# Patient Record
Sex: FEMALE | Race: WHITE | NOT HISPANIC OR LATINO | Employment: OTHER | URBAN - NONMETROPOLITAN AREA
[De-identification: names, ages, dates, MRNs, and addresses within clinical notes are randomized per-mention and may not be internally consistent; named-entity substitution may affect disease eponyms.]

---

## 2017-04-13 ENCOUNTER — HOSPITAL ENCOUNTER (INPATIENT)
Facility: HOSPITAL | Age: 62
LOS: 2 days | Discharge: HOME | End: 2017-04-15
Attending: PSYCHIATRY & NEUROLOGY | Admitting: PSYCHIATRY & NEUROLOGY

## 2017-04-13 DIAGNOSIS — R41.89 IMPAIRED COGNITION: Primary | ICD-10-CM

## 2017-04-13 DIAGNOSIS — R07.9 CHEST PAIN IN ADULT: ICD-10-CM

## 2017-04-13 PROBLEM — I63.9 STROKE (HCC): Status: ACTIVE | Noted: 2017-04-13

## 2017-04-13 LAB
ALBUMIN SERPL-MCNC: 3.1 G/DL (ref 3.5–5)
ALBUMIN/GLOB SERPL: 0.8 G/DL (ref 1.1–2.5)
ALP SERPL-CCNC: 115 U/L (ref 24–120)
ALT SERPL W P-5'-P-CCNC: <15 U/L (ref 0–54)
ANION GAP SERPL CALCULATED.3IONS-SCNC: 11 MMOL/L (ref 4–13)
AST SERPL-CCNC: 28 U/L (ref 7–45)
BILIRUB SERPL-MCNC: 0.7 MG/DL (ref 0.1–1)
BUN BLD-MCNC: 18 MG/DL (ref 5–21)
BUN/CREAT SERPL: 11.7 (ref 7–25)
CALCIUM SPEC-SCNC: 8.5 MG/DL (ref 8.4–10.4)
CHLORIDE SERPL-SCNC: 105 MMOL/L (ref 98–110)
CO2 SERPL-SCNC: 24 MMOL/L (ref 24–31)
CREAT BLD-MCNC: 1.54 MG/DL (ref 0.5–1.4)
DEPRECATED RDW RBC AUTO: 53.4 FL (ref 40–54)
ERYTHROCYTE [DISTWIDTH] IN BLOOD BY AUTOMATED COUNT: 15.5 % (ref 12–15)
GFR SERPL CREATININE-BSD FRML MDRD: 34 ML/MIN/1.73
GLOBULIN UR ELPH-MCNC: 3.7 GM/DL
GLUCOSE BLD-MCNC: 124 MG/DL (ref 70–100)
GLUCOSE BLDC GLUCOMTR-MCNC: 124 MG/DL (ref 70–130)
HCT VFR BLD AUTO: 39.5 % (ref 37–47)
HGB BLD-MCNC: 13.2 G/DL (ref 12–16)
MCH RBC QN AUTO: 31.6 PG (ref 28–32)
MCHC RBC AUTO-ENTMCNC: 33.4 G/DL (ref 33–36)
MCV RBC AUTO: 94.5 FL (ref 82–98)
PLATELET # BLD AUTO: 349 10*3/MM3 (ref 130–400)
PMV BLD AUTO: 9.9 FL (ref 6–12)
POTASSIUM BLD-SCNC: 4.6 MMOL/L (ref 3.5–5.3)
PROT SERPL-MCNC: 6.8 G/DL (ref 6.3–8.7)
RBC # BLD AUTO: 4.18 10*6/MM3 (ref 4.2–5.4)
SODIUM BLD-SCNC: 140 MMOL/L (ref 135–145)
TROPONIN I SERPL-MCNC: 0.01 NG/ML (ref 0–0.03)
WBC NRBC COR # BLD: 16.67 10*3/MM3 (ref 4.8–10.8)

## 2017-04-13 PROCEDURE — 25010000002 ENOXAPARIN PER 10 MG: Performed by: PSYCHIATRY & NEUROLOGY

## 2017-04-13 PROCEDURE — 82962 GLUCOSE BLOOD TEST: CPT

## 2017-04-13 PROCEDURE — 84484 ASSAY OF TROPONIN QUANT: CPT | Performed by: PSYCHIATRY & NEUROLOGY

## 2017-04-13 PROCEDURE — 85027 COMPLETE CBC AUTOMATED: CPT | Performed by: PSYCHIATRY & NEUROLOGY

## 2017-04-13 PROCEDURE — 80053 COMPREHEN METABOLIC PANEL: CPT | Performed by: PSYCHIATRY & NEUROLOGY

## 2017-04-13 RX ORDER — OXYCODONE AND ACETAMINOPHEN 10; 325 MG/1; MG/1
1 TABLET ORAL EVERY 6 HOURS PRN
Status: DISCONTINUED | OUTPATIENT
Start: 2017-04-13 | End: 2017-04-15 | Stop reason: HOSPADM

## 2017-04-13 RX ORDER — LABETALOL HYDROCHLORIDE 5 MG/ML
10 INJECTION, SOLUTION INTRAVENOUS
Status: DISCONTINUED | OUTPATIENT
Start: 2017-04-13 | End: 2017-04-15 | Stop reason: HOSPADM

## 2017-04-13 RX ORDER — ASPIRIN 325 MG
325 TABLET ORAL DAILY
Status: DISCONTINUED | OUTPATIENT
Start: 2017-04-14 | End: 2017-04-14

## 2017-04-13 RX ORDER — ATORVASTATIN CALCIUM 40 MG/1
80 TABLET, FILM COATED ORAL NIGHTLY
Status: DISCONTINUED | OUTPATIENT
Start: 2017-04-13 | End: 2017-04-15 | Stop reason: HOSPADM

## 2017-04-13 RX ORDER — FOLIC ACID 1 MG/1
1 TABLET ORAL DAILY
COMMUNITY

## 2017-04-13 RX ORDER — ONDANSETRON 2 MG/ML
4 INJECTION INTRAMUSCULAR; INTRAVENOUS EVERY 6 HOURS PRN
Status: DISCONTINUED | OUTPATIENT
Start: 2017-04-13 | End: 2017-04-15 | Stop reason: HOSPADM

## 2017-04-13 RX ORDER — SODIUM CHLORIDE 9 MG/ML
50 INJECTION, SOLUTION INTRAVENOUS CONTINUOUS
Status: DISCONTINUED | OUTPATIENT
Start: 2017-04-13 | End: 2017-04-15 | Stop reason: HOSPADM

## 2017-04-13 RX ORDER — SODIUM CHLORIDE 0.9 % (FLUSH) 0.9 %
1-10 SYRINGE (ML) INJECTION AS NEEDED
Status: DISCONTINUED | OUTPATIENT
Start: 2017-04-13 | End: 2017-04-15 | Stop reason: HOSPADM

## 2017-04-13 RX ORDER — ASPIRIN 300 MG/1
300 SUPPOSITORY RECTAL DAILY
Status: DISCONTINUED | OUTPATIENT
Start: 2017-04-14 | End: 2017-04-14

## 2017-04-13 RX ADMIN — SODIUM CHLORIDE 75 ML/HR: 9 INJECTION, SOLUTION INTRAVENOUS at 18:34

## 2017-04-13 RX ADMIN — ENOXAPARIN SODIUM 30 MG: 100 INJECTION SUBCUTANEOUS at 18:34

## 2017-04-13 RX ADMIN — DESMOPRESSIN ACETATE 80 MG: 0.2 TABLET ORAL at 21:37

## 2017-04-14 ENCOUNTER — APPOINTMENT (OUTPATIENT)
Dept: ULTRASOUND IMAGING | Facility: HOSPITAL | Age: 62
End: 2017-04-14

## 2017-04-14 ENCOUNTER — APPOINTMENT (OUTPATIENT)
Dept: CARDIOLOGY | Facility: HOSPITAL | Age: 62
End: 2017-04-14
Attending: PSYCHIATRY & NEUROLOGY

## 2017-04-14 LAB
ALBUMIN SERPL-MCNC: 3 G/DL (ref 3.5–5)
ALBUMIN/GLOB SERPL: 0.9 G/DL (ref 1.1–2.5)
ALP SERPL-CCNC: 113 U/L (ref 24–120)
ALT SERPL W P-5'-P-CCNC: 17 U/L (ref 0–54)
ANION GAP SERPL CALCULATED.3IONS-SCNC: 6 MMOL/L (ref 4–13)
ARTICHOKE IGE QN: 110 MG/DL (ref 0–99)
AST SERPL-CCNC: 23 U/L (ref 7–45)
BH CV ECHO MEAS - AO MAX PG (FULL): 2.9 MMHG
BH CV ECHO MEAS - AO MAX PG: 7.2 MMHG
BH CV ECHO MEAS - AO MEAN PG (FULL): 2 MMHG
BH CV ECHO MEAS - AO MEAN PG: 4 MMHG
BH CV ECHO MEAS - AO ROOT AREA (BSA CORRECTED): 1.7
BH CV ECHO MEAS - AO ROOT AREA: 10.8 CM^2
BH CV ECHO MEAS - AO ROOT DIAM: 3.7 CM
BH CV ECHO MEAS - AO V2 MAX: 134 CM/SEC
BH CV ECHO MEAS - AO V2 MEAN: 91.3 CM/SEC
BH CV ECHO MEAS - AO V2 VTI: 29.9 CM
BH CV ECHO MEAS - AVA(I,A): 2.5 CM^2
BH CV ECHO MEAS - AVA(I,D): 2.5 CM^2
BH CV ECHO MEAS - AVA(V,A): 2.4 CM^2
BH CV ECHO MEAS - AVA(V,D): 2.4 CM^2
BH CV ECHO MEAS - BSA(HAYCOCK): 2.4 M^2
BH CV ECHO MEAS - BSA: 2.2 M^2
BH CV ECHO MEAS - BZI_BMI: 43.6 KILOGRAMS/M^2
BH CV ECHO MEAS - BZI_METRIC_HEIGHT: 165.1 CM
BH CV ECHO MEAS - BZI_METRIC_WEIGHT: 118.8 KG
BH CV ECHO MEAS - CONTRAST EF 4CH: 62.1 ML/M^2
BH CV ECHO MEAS - EDV(CUBED): 91.1 ML
BH CV ECHO MEAS - EDV(MOD-SP4): 76 ML
BH CV ECHO MEAS - EDV(TEICH): 92.4 ML
BH CV ECHO MEAS - EF(CUBED): 71.3 %
BH CV ECHO MEAS - EF(MOD-SP4): 62.1 %
BH CV ECHO MEAS - EF(TEICH): 63.1 %
BH CV ECHO MEAS - ESV(CUBED): 26.2 ML
BH CV ECHO MEAS - ESV(MOD-SP4): 28.8 ML
BH CV ECHO MEAS - ESV(TEICH): 34.2 ML
BH CV ECHO MEAS - FS: 34 %
BH CV ECHO MEAS - IVS/LVPW: 1
BH CV ECHO MEAS - IVSD: 1.3 CM
BH CV ECHO MEAS - LA DIMENSION: 3.6 CM
BH CV ECHO MEAS - LA/AO: 0.97
BH CV ECHO MEAS - LAT PEAK E' VEL: 10 CM/SEC
BH CV ECHO MEAS - LV DIASTOLIC VOL/BSA (35-75): 34.3 ML/M^2
BH CV ECHO MEAS - LV MASS(C)D: 217.6 GRAMS
BH CV ECHO MEAS - LV MASS(C)DI: 98.1 GRAMS/M^2
BH CV ECHO MEAS - LV MAX PG: 4.3 MMHG
BH CV ECHO MEAS - LV MEAN PG: 2 MMHG
BH CV ECHO MEAS - LV SYSTOLIC VOL/BSA (12-30): 13 ML/M^2
BH CV ECHO MEAS - LV V1 MAX: 104 CM/SEC
BH CV ECHO MEAS - LV V1 MEAN: 74.1 CM/SEC
BH CV ECHO MEAS - LV V1 VTI: 24.2 CM
BH CV ECHO MEAS - LVIDD: 4.5 CM
BH CV ECHO MEAS - LVIDS: 3 CM
BH CV ECHO MEAS - LVLD AP4: 6.9 CM
BH CV ECHO MEAS - LVLS AP4: 6.5 CM
BH CV ECHO MEAS - LVOT AREA (M): 3.1 CM^2
BH CV ECHO MEAS - LVOT AREA: 3.1 CM^2
BH CV ECHO MEAS - LVOT DIAM: 2 CM
BH CV ECHO MEAS - LVPWD: 1.3 CM
BH CV ECHO MEAS - MED PEAK E' VEL: 5.98 CM/SEC
BH CV ECHO MEAS - MV A MAX VEL: 63.5 CM/SEC
BH CV ECHO MEAS - MV DEC TIME: 0.19 SEC
BH CV ECHO MEAS - MV E MAX VEL: 93.6 CM/SEC
BH CV ECHO MEAS - MV E/A: 1.5
BH CV ECHO MEAS - RAP SYSTOLE: 10 MMHG
BH CV ECHO MEAS - RVSP: 23.1 MMHG
BH CV ECHO MEAS - SI(AO): 144.9 ML/M^2
BH CV ECHO MEAS - SI(CUBED): 29.3 ML/M^2
BH CV ECHO MEAS - SI(LVOT): 34.3 ML/M^2
BH CV ECHO MEAS - SI(MOD-SP4): 21.3 ML/M^2
BH CV ECHO MEAS - SI(TEICH): 26.3 ML/M^2
BH CV ECHO MEAS - SV(AO): 321.5 ML
BH CV ECHO MEAS - SV(CUBED): 64.9 ML
BH CV ECHO MEAS - SV(LVOT): 76 ML
BH CV ECHO MEAS - SV(MOD-SP4): 47.2 ML
BH CV ECHO MEAS - SV(TEICH): 58.3 ML
BH CV ECHO MEAS - TR MAX VEL: 181 CM/SEC
BILIRUB SERPL-MCNC: 0.5 MG/DL (ref 0.1–1)
BUN BLD-MCNC: 18 MG/DL (ref 5–21)
BUN/CREAT SERPL: 12.6 (ref 7–25)
CALCIUM SPEC-SCNC: 8.2 MG/DL (ref 8.4–10.4)
CHLORIDE SERPL-SCNC: 107 MMOL/L (ref 98–110)
CHOLEST SERPL-MCNC: 183 MG/DL (ref 130–200)
CO2 SERPL-SCNC: 25 MMOL/L (ref 24–31)
CREAT BLD-MCNC: 1.43 MG/DL (ref 0.5–1.4)
DEPRECATED RDW RBC AUTO: 54.4 FL (ref 40–54)
ERYTHROCYTE [DISTWIDTH] IN BLOOD BY AUTOMATED COUNT: 15.7 % (ref 12–15)
GFR SERPL CREATININE-BSD FRML MDRD: 38 ML/MIN/1.73
GLOBULIN UR ELPH-MCNC: 3.5 GM/DL
GLUCOSE BLD-MCNC: 137 MG/DL (ref 70–100)
GLUCOSE BLDC GLUCOMTR-MCNC: 140 MG/DL (ref 70–130)
GLUCOSE BLDC GLUCOMTR-MCNC: 140 MG/DL (ref 70–130)
GLUCOSE BLDC GLUCOMTR-MCNC: 146 MG/DL (ref 70–130)
GLUCOSE BLDC GLUCOMTR-MCNC: 164 MG/DL (ref 70–130)
GLUCOSE BLDC GLUCOMTR-MCNC: 178 MG/DL (ref 70–130)
HBA1C MFR BLD: 8.6 %
HCT VFR BLD AUTO: 38.7 % (ref 37–47)
HCT VFR BLD AUTO: 41.6 % (ref 37–47)
HDLC SERPL-MCNC: 25 MG/DL
HGB BLD-MCNC: 12.6 G/DL (ref 12–16)
LDLC/HDLC SERPL: ABNORMAL {RATIO}
LEFT ATRIUM VOLUME INDEX: 30.3 ML/M2
LEFT ATRIUM VOLUME: 67.2 CM3
MCH RBC QN AUTO: 31 PG (ref 28–32)
MCHC RBC AUTO-ENTMCNC: 32.6 G/DL (ref 33–36)
MCV RBC AUTO: 95.1 FL (ref 82–98)
PA ADP PRP-ACNC: 218 PRU
PLATELET # BLD AUTO: 331 10*3/MM3 (ref 130–400)
PLATELET # BLD AUTO: 373 10*3/MM3 (ref 130–400)
PMV BLD AUTO: 9.8 FL (ref 6–12)
POTASSIUM BLD-SCNC: 4.2 MMOL/L (ref 3.5–5.3)
PROT SERPL-MCNC: 6.5 G/DL (ref 6.3–8.7)
RBC # BLD AUTO: 4.07 10*6/MM3 (ref 4.2–5.4)
SODIUM BLD-SCNC: 138 MMOL/L (ref 135–145)
TRIGL SERPL-MCNC: 500 MG/DL (ref 0–149)
TSH SERPL DL<=0.05 MIU/L-ACNC: 0.91 MIU/ML (ref 0.47–4.68)
WBC NRBC COR # BLD: 14.55 10*3/MM3 (ref 4.8–10.8)

## 2017-04-14 PROCEDURE — G9158 MOTOR SPEECH D/C STATUS: HCPCS

## 2017-04-14 PROCEDURE — 25010000002 PERFLUTREN (DEFINITY) 8.476 MG IN SODIUM CHLORIDE 10 ML INJECTION: Performed by: PSYCHIATRY & NEUROLOGY

## 2017-04-14 PROCEDURE — 93880 EXTRACRANIAL BILAT STUDY: CPT | Performed by: SURGERY

## 2017-04-14 PROCEDURE — 92523 SPEECH SOUND LANG COMPREHEN: CPT

## 2017-04-14 PROCEDURE — G8979 MOBILITY GOAL STATUS: HCPCS | Performed by: PHYSICAL THERAPIST

## 2017-04-14 PROCEDURE — 83036 HEMOGLOBIN GLYCOSYLATED A1C: CPT | Performed by: PSYCHIATRY & NEUROLOGY

## 2017-04-14 PROCEDURE — 85576 BLOOD PLATELET AGGREGATION: CPT | Performed by: PSYCHIATRY & NEUROLOGY

## 2017-04-14 PROCEDURE — 84443 ASSAY THYROID STIM HORMONE: CPT | Performed by: PSYCHIATRY & NEUROLOGY

## 2017-04-14 PROCEDURE — G8978 MOBILITY CURRENT STATUS: HCPCS | Performed by: PHYSICAL THERAPIST

## 2017-04-14 PROCEDURE — 97165 OT EVAL LOW COMPLEX 30 MIN: CPT

## 2017-04-14 PROCEDURE — 82962 GLUCOSE BLOOD TEST: CPT

## 2017-04-14 PROCEDURE — 63710000001 INSULIN LISPRO (HUMAN) PER 5 UNITS: Performed by: PSYCHIATRY & NEUROLOGY

## 2017-04-14 PROCEDURE — G8980 MOBILITY D/C STATUS: HCPCS | Performed by: PHYSICAL THERAPIST

## 2017-04-14 PROCEDURE — G9186 MOTOR SPEECH GOAL STATUS: HCPCS

## 2017-04-14 PROCEDURE — G8988 SELF CARE GOAL STATUS: HCPCS

## 2017-04-14 PROCEDURE — 80053 COMPREHEN METABOLIC PANEL: CPT | Performed by: PSYCHIATRY & NEUROLOGY

## 2017-04-14 PROCEDURE — G8987 SELF CARE CURRENT STATUS: HCPCS

## 2017-04-14 PROCEDURE — 25010000002 ENOXAPARIN PER 10 MG: Performed by: PSYCHIATRY & NEUROLOGY

## 2017-04-14 PROCEDURE — G8989 SELF CARE D/C STATUS: HCPCS

## 2017-04-14 PROCEDURE — 99223 1ST HOSP IP/OBS HIGH 75: CPT | Performed by: PSYCHIATRY & NEUROLOGY

## 2017-04-14 PROCEDURE — C8929 TTE W OR WO FOL WCON,DOPPLER: HCPCS

## 2017-04-14 PROCEDURE — 80061 LIPID PANEL: CPT | Performed by: PSYCHIATRY & NEUROLOGY

## 2017-04-14 PROCEDURE — 93880 EXTRACRANIAL BILAT STUDY: CPT

## 2017-04-14 PROCEDURE — 97161 PT EVAL LOW COMPLEX 20 MIN: CPT

## 2017-04-14 PROCEDURE — 85027 COMPLETE CBC AUTOMATED: CPT | Performed by: PSYCHIATRY & NEUROLOGY

## 2017-04-14 PROCEDURE — 93306 TTE W/DOPPLER COMPLETE: CPT | Performed by: INTERNAL MEDICINE

## 2017-04-14 PROCEDURE — G8999 MOTOR SPEECH CURRENT STATUS: HCPCS

## 2017-04-14 RX ORDER — METOPROLOL SUCCINATE 100 MG/1
100 TABLET, EXTENDED RELEASE ORAL DAILY
Status: DISCONTINUED | OUTPATIENT
Start: 2017-04-15 | End: 2017-04-15 | Stop reason: HOSPADM

## 2017-04-14 RX ORDER — METOPROLOL SUCCINATE 50 MG/1
50 TABLET, EXTENDED RELEASE ORAL DAILY
Status: DISCONTINUED | OUTPATIENT
Start: 2017-04-14 | End: 2017-04-14

## 2017-04-14 RX ORDER — ASPIRIN 81 MG/1
81 TABLET, CHEWABLE ORAL DAILY
Status: DISCONTINUED | OUTPATIENT
Start: 2017-04-15 | End: 2017-04-15 | Stop reason: HOSPADM

## 2017-04-14 RX ORDER — PANTOPRAZOLE SODIUM 40 MG/1
40 TABLET, DELAYED RELEASE ORAL
Status: DISCONTINUED | OUTPATIENT
Start: 2017-04-14 | End: 2017-04-15 | Stop reason: HOSPADM

## 2017-04-14 RX ORDER — FOLIC ACID 1 MG/1
1 TABLET ORAL DAILY
Status: DISCONTINUED | OUTPATIENT
Start: 2017-04-14 | End: 2017-04-15 | Stop reason: HOSPADM

## 2017-04-14 RX ORDER — ALBUTEROL SULFATE 2.5 MG/3ML
2.5 SOLUTION RESPIRATORY (INHALATION) EVERY 6 HOURS PRN
Status: DISCONTINUED | OUTPATIENT
Start: 2017-04-14 | End: 2017-04-15 | Stop reason: HOSPADM

## 2017-04-14 RX ORDER — OXYCODONE AND ACETAMINOPHEN 10; 325 MG/1; MG/1
1 TABLET ORAL EVERY 6 HOURS PRN
Status: DISCONTINUED | OUTPATIENT
Start: 2017-04-14 | End: 2017-04-14 | Stop reason: SDUPTHER

## 2017-04-14 RX ORDER — CLOPIDOGREL BISULFATE 75 MG/1
75 TABLET ORAL DAILY
Status: DISCONTINUED | OUTPATIENT
Start: 2017-04-14 | End: 2017-04-15 | Stop reason: HOSPADM

## 2017-04-14 RX ORDER — PREGABALIN 100 MG/1
100 CAPSULE ORAL 3 TIMES DAILY
Status: DISCONTINUED | OUTPATIENT
Start: 2017-04-14 | End: 2017-04-15 | Stop reason: HOSPADM

## 2017-04-14 RX ORDER — ROPINIROLE 2 MG/1
2 TABLET, FILM COATED ORAL NIGHTLY PRN
COMMUNITY

## 2017-04-14 RX ORDER — LISINOPRIL 20 MG/1
20 TABLET ORAL DAILY
Status: DISCONTINUED | OUTPATIENT
Start: 2017-04-14 | End: 2017-04-15 | Stop reason: HOSPADM

## 2017-04-14 RX ORDER — METOPROLOL SUCCINATE 100 MG/1
100 TABLET, EXTENDED RELEASE ORAL DAILY
COMMUNITY

## 2017-04-14 RX ORDER — DIAZEPAM 5 MG/1
5 TABLET ORAL 2 TIMES DAILY PRN
Status: DISCONTINUED | OUTPATIENT
Start: 2017-04-14 | End: 2017-04-15 | Stop reason: HOSPADM

## 2017-04-14 RX ORDER — POTASSIUM CHLORIDE 1.5 G/1.77G
20 POWDER, FOR SOLUTION ORAL 2 TIMES DAILY
Status: DISCONTINUED | OUTPATIENT
Start: 2017-04-14 | End: 2017-04-15 | Stop reason: HOSPADM

## 2017-04-14 RX ORDER — NICOTINE POLACRILEX 4 MG
15 LOZENGE BUCCAL
Status: DISCONTINUED | OUTPATIENT
Start: 2017-04-14 | End: 2017-04-15 | Stop reason: HOSPADM

## 2017-04-14 RX ORDER — DULOXETIN HYDROCHLORIDE 30 MG/1
30 CAPSULE, DELAYED RELEASE ORAL DAILY
Status: DISCONTINUED | OUTPATIENT
Start: 2017-04-14 | End: 2017-04-15 | Stop reason: HOSPADM

## 2017-04-14 RX ORDER — ROPINIROLE 0.25 MG/1
0.5 TABLET, FILM COATED ORAL NIGHTLY
Status: DISCONTINUED | OUTPATIENT
Start: 2017-04-14 | End: 2017-04-14

## 2017-04-14 RX ORDER — FUROSEMIDE 20 MG/1
20 TABLET ORAL DAILY
Status: DISCONTINUED | OUTPATIENT
Start: 2017-04-14 | End: 2017-04-14

## 2017-04-14 RX ORDER — ROPINIROLE 1 MG/1
2 TABLET, FILM COATED ORAL NIGHTLY
Status: DISCONTINUED | OUTPATIENT
Start: 2017-04-14 | End: 2017-04-15 | Stop reason: HOSPADM

## 2017-04-14 RX ORDER — DEXTROSE MONOHYDRATE 25 G/50ML
25 INJECTION, SOLUTION INTRAVENOUS
Status: DISCONTINUED | OUTPATIENT
Start: 2017-04-14 | End: 2017-04-15 | Stop reason: HOSPADM

## 2017-04-14 RX ADMIN — OXYCODONE HYDROCHLORIDE AND ACETAMINOPHEN 1 TABLET: 10; 325 TABLET ORAL at 22:05

## 2017-04-14 RX ADMIN — PREGABALIN 100 MG: 100 CAPSULE ORAL at 12:29

## 2017-04-14 RX ADMIN — SITAGLIPTIN 25 MG: 25 TABLET, FILM COATED ORAL at 12:29

## 2017-04-14 RX ADMIN — OXYCODONE HYDROCHLORIDE AND ACETAMINOPHEN 1 TABLET: 10; 325 TABLET ORAL at 08:16

## 2017-04-14 RX ADMIN — INSULIN LISPRO 2 UNITS: 100 INJECTION, SOLUTION INTRAVENOUS; SUBCUTANEOUS at 12:30

## 2017-04-14 RX ADMIN — POTASSIUM CHLORIDE 20 MEQ: 1.5 POWDER, FOR SOLUTION ORAL at 12:30

## 2017-04-14 RX ADMIN — INSULIN LISPRO 2 UNITS: 100 INJECTION, SOLUTION INTRAVENOUS; SUBCUTANEOUS at 17:04

## 2017-04-14 RX ADMIN — METOPROLOL SUCCINATE 50 MG: 50 TABLET, FILM COATED, EXTENDED RELEASE ORAL at 12:29

## 2017-04-14 RX ADMIN — LISINOPRIL 20 MG: 20 TABLET ORAL at 12:29

## 2017-04-14 RX ADMIN — PANTOPRAZOLE SODIUM 40 MG: 40 TABLET, DELAYED RELEASE ORAL at 12:29

## 2017-04-14 RX ADMIN — ROPINIROLE 2 MG: 1 TABLET, FILM COATED ORAL at 21:19

## 2017-04-14 RX ADMIN — DULOXETINE HYDROCHLORIDE 30 MG: 30 CAPSULE, DELAYED RELEASE ORAL at 12:30

## 2017-04-14 RX ADMIN — POTASSIUM CHLORIDE 20 MEQ: 1.5 POWDER, FOR SOLUTION ORAL at 17:04

## 2017-04-14 RX ADMIN — CLOPIDOGREL BISULFATE 75 MG: 75 TABLET, FILM COATED ORAL at 12:29

## 2017-04-14 RX ADMIN — FUROSEMIDE 20 MG: 20 TABLET ORAL at 12:29

## 2017-04-14 RX ADMIN — ASPIRIN 325 MG: 325 TABLET, COATED ORAL at 08:16

## 2017-04-14 RX ADMIN — FOLIC ACID 1 MG: 1 TABLET ORAL at 12:29

## 2017-04-14 RX ADMIN — SODIUM CHLORIDE 7 ML: 9 INJECTION INTRAMUSCULAR; INTRAVENOUS; SUBCUTANEOUS at 10:34

## 2017-04-14 RX ADMIN — PREGABALIN 100 MG: 100 CAPSULE ORAL at 21:19

## 2017-04-14 RX ADMIN — PREGABALIN 100 MG: 100 CAPSULE ORAL at 17:04

## 2017-04-14 RX ADMIN — OXYCODONE HYDROCHLORIDE AND ACETAMINOPHEN 1 TABLET: 10; 325 TABLET ORAL at 15:59

## 2017-04-14 RX ADMIN — ENOXAPARIN SODIUM 30 MG: 100 INJECTION SUBCUTANEOUS at 17:06

## 2017-04-14 RX ADMIN — DESMOPRESSIN ACETATE 80 MG: 0.2 TABLET ORAL at 21:19

## 2017-04-14 RX ADMIN — OXYCODONE HYDROCHLORIDE AND ACETAMINOPHEN 1 TABLET: 10; 325 TABLET ORAL at 00:04

## 2017-04-14 NOTE — THERAPY DISCHARGE NOTE
Acute Care - Physical Therapy Initial Eval/Discharge  [facility]     Patient Name: Kerry Woodruff  : 1955  MRN: [id number]  Today's Date: 2017   Onset of Illness/Injury or Date of Surgery Date: 17  Date of Referral to PT: 17  Referring Physician: Jack Mcgregor MD      Admit Date: 2017    Visit Dx:    ICD-10-CM ICD-9-CM   1. Impaired cognition R41.89 294.9   2. Chest pain in adult R07.9 786.50     Patient Active Problem List   Diagnosis   • ST elevation myocardial infarction involving right coronary artery   • CAD in native artery   • Essential hypertension   • Mixed hyperlipidemia   • Chest pain in adult   • Stroke     Past Medical History:   Diagnosis Date   • Arthritis    • Asthma    • CAD (coronary artery disease)    • CHF (congestive heart failure)    • COPD (chronic obstructive pulmonary disease)    • Diabetes mellitus    • Disease of thyroid gland    • Heart disease    • Hiatal hernia    • History of transfusion    • Hypertension    • Injury of back    • Lung disease    • Nephrolithiasis    • Neuralgia    • Renal failure    • Sepsis    • Stroke 2017     Past Surgical History:   Procedure Laterality Date   • ADENOIDECTOMY     • CARDIAC CATHETERIZATION     • CARDIAC CATHETERIZATION N/A 2016    Procedure: Coronary angiography;  Surgeon: Ozzy Johnson MD;  Location: [facility] INVASIVE LOCATION;  Service:    •  SECTION      X 2   • CHOLECYSTECTOMY     • CORONARY ANGIOPLASTY     • GASTRIC RESECTION     • GASTROPLASTY     • HYSTERECTOMY     • CT RT/LT HEART CATHETERS N/A 2016    Procedure: Percutaneous Coronary Intervention;  Surgeon: Ozzy Johnson MD;  Location: [facility] CATH INVASIVE LOCATION;  Service: Cardiovascular   • SPLENECTOMY            PT ASSESSMENT (last 72 hours)      PT Evaluation       17 1501 17 1442    Rehab Evaluation    Document Type evaluation  -MS (r) EK (t) MS (c)     Subjective Information agree to  therapy;complains of;pain   chronic LBP  -MS (r) EK (t) MS (c)     General Information    Patient Profile Review yes  -MS (r) EK (t) MS (c)     Onset of Illness/Injury or Date of Surgery Date 04/13/17  -MS (r) EK (t) MS (c)     Referring Physician Jack Mcgregor MD  -MS (r) EK (t) MS (c)     General Observations pt supine in bed, IV site, sleeping, son present  -MS (r) EK (t) MS (c)     Pertinent History Of Current Problem t/f from [facility] to [facility] to r/o CVA; pt presented with slurred speech, lightheadedness, dizziness, RUE weakness, and headache; CT of head negative for acute infarct;  Hx: CKD, HTN, hiatal hernia, hypothyroidism, DM, COPD, CHF, OA, asthma  -MS (r) EK (t) MS (c)     Precautions/Limitations fall precautions  -MS (r) EK (t) MS (c)     Prior Level of Function independent:;all household mobility;community mobility;gait;bed mobility;ADL's;driving  -MS (r) EK (t) MS (c)     Equipment Currently Used at Home walker, rolling;cane, straight   pt does not currently use any AD  -MS (r) EK (t) MS (c) walker, standard;cane, straight  [facility]    Plans/Goals Discussed With patient and family;agreed upon  -MS (r) EK (t) MS (c)     Risks Reviewed patient and family:;LOB;nausea/vomiting;dizziness;increased discomfort;change in vital signs;increased drainage;lines disloged  -MS (r) EK (t) MS (c)     Benefits Reviewed patient and family:;improve function;increase independence;increase strength;increase balance;decrease pain;decrease risk of DVT;improve skin integrity;increase knowledge  -MS (r) EK (t) MS (c)     Barriers to Rehab none identified  -MS (r) EK (t) MS (c)     Living Environment    Lives With child(dhaval), adult   24/7 care from son if needed  -MS (r) EK (t) MS (c) child(dhaval), adult   Son - Available 24/7  -KP    Living Arrangements house  -MS (r) EK (t) MS (c) house  -    Home Accessibility no concerns  -MS (r) EK (t) MS (c)     Transportation Available  family or friend will  provide;car  -    Clinical Impression    Date of Referral to PT 04/13/17  -MS (r) EK (t) MS (c)     Patient/Family Goals Statement return home  -MS (r) EK (t) MS (c)     Criteria for Skilled Therapeutic Interventions Met no;no problems identified which require skilled intervention  -MS (r) EK (t) MS (c)     Pain Assessment    Pain Assessment 0-10  -MS (r) EK (t) MS (c)     Pain Score 7  -MS (r) EK (t) MS (c)     Pain Type Chronic pain  -MS (r) EK (t) MS (c)     Pain Location Back  -MS (r) EK (t) MS (c)     Pain Orientation Lower  -MS (r) EK (t) MS (c)     Pain Descriptors Aching;Dull  -MS (r) EK (t) MS (c)     Pain Frequency Constant/continuous  -MS (r) EK (t) MS (c)     Pain Intervention(s) Ambulation/increased activity;Repositioned  -MS (r) EK (t) MS (c)     Response to Interventions tolerated  -MS (r) EK (t) MS (c)     Vision Assessment/Intervention    Visual Impairment WFL  -MS (r) EK (t) MS (c)     Cognitive Assessment/Intervention    Current Cognitive/Communication Assessment functional  -MS (r) EK (t) MS (c)     Orientation Status oriented x 4  -MS (r) EK (t) MS (c)     Follows Commands/Answers Questions able to follow multi-step instructions;100% of the time  -MS (r) EK (t) MS (c)     Personal Safety WNL/WFL  -MS (r) EK (t) MS (c)     Personal Safety Interventions fall prevention program maintained;gait belt;nonskid shoes/slippers when out of bed;supervised activity  -MS (r) EK (t) MS (c)     ROM (Range of Motion)    General ROM Detail AROM WFL throughout  -MS (r) EK (t) MS (c)     MMT (Manual Muscle Testing)    General MMT Assessment Detail functionally 4 to 5/5 throughout  -MS (r) EK (t) MS (c)     Bed Mobility, Assessment/Treatment    Bed Mobility, Assistive Device head of bed elevated  -MS (r) EK (t) MS (c)     Bed Mobility, Roll Left, [place] independent  -MS (r) EK (t) MS (c)     Bed Mob, Sidelying to Sit, [place] independent  -MS (r) EK (t) MS (c)     Transfer Assessment/Treatment     Transfers, Sit-Stand [place] supervision required  -MS (r) EK (t) MS (c)     Transfers, Stand-Sit [place] supervision required  -MS (r) EK (t) MS (c)     Gait Assessment/Treatment    Gait, [place] Level supervision required  -MS (r) EK (t) MS (c)     Gait, Distance (Feet) 200  -MS (r) EK (t) MS (c)     Gait, Comment no obvious gait impairments at this time  -MS (r) EK (t) MS (c)     Motor Skills/Interventions    Additional Documentation Balance Skills Training (Group)  -MS (r) EK (t) MS (c)     Balance Skills Training    Sitting-Level of Assistance Independent  -MS (r) EK (t) MS (c)     Sitting-Balance Support Feet supported  -MS (r) EK (t) MS (c)     Standing-Level of Assistance Distant supervision  -MS (r) EK (t) MS (c)     Static Standing Balance Support No upper extremity supported  -MS (r) EK (t) MS (c)     Gait Balance-Level of Assistance Close supervision  -MS (r) EK (t) MS (c)     Gait Balance Support No upper extremity supported  -MS (r) EK (t) MS (c)     Fine Motor Coordination Training    Opposition Right:;Left:;intact  -MS (r) EK (t) MS (c)     Gross Motor Coordination Training    Gross Motor Skill, Impairments Detail ROSALIND intact bilaterally, finger to nose intact bilaterally  -MS (r) EK (t) MS (c)     Sensory Assessment/Intervention    Sensory Impairment --   WNL per pt report  -MS (r) EK (t) MS (c)     Positioning and Restraints    Pre-Treatment Position in bed  -MS (r) EK (t) MS (c)     Post Treatment Position bed  -MS (r) EK (t) MS (c)     In Bed sitting EOB;call light within reach;encouraged to call for assist;notified nsg;with family/caregiver  -MS (r) EK (t) MS (c)       04/14/17 1342 04/14/17 0742    Rehab Evaluation    Document Type evaluation  -MB --  -MS (r) EK (t) MS (c)    Subjective Information no complaints;agree to therapy  -MB     Evaluation Not Performed  patient unavailable for evaluation  -MS (r) EK (t) MS (c)    Evaluation Not Performed, Comment  Pt gone for testing; PT  will check back later this AM  -MS (r) EK (t) MS (c)    General Information    Patient Profile Review  yes  -MS (r) EK (t) MS (c)    Onset of Illness/Injury or Date of Surgery Date  04/13/17  -MS (r) EK (t) MS (c)    Referring Physician  Jack Mcgregor MD  -MS (r) EK (t) MS (c)    Pertinent History Of Current Problem  t/f from [facility] to [facility] to r/o CVA; pt presented with slurred speech, lightheadedness, dizziness, RUE weakness, and headache; CT of head negative for acute infarct;  Hx: CKD, HTN, hiatal hernia, hypothyroidism, DM, COPD, CHF, OA, asthma  -MS (r) EK (t) MS (c)    Precautions/Limitations  fall precautions  -MS (r) EK (t) MS (c)    Clinical Impression    Date of Referral to PT  04/13/17  -MS (r) EK (t) MS (c)    Cognitive Assessment/Intervention    Current Cognitive/Communication Assessment functional  -MB     Orientation Status oriented to;person;place;time  -MB     Follows Commands/Answers Questions 100% of the time  -MB     Additional Documentation Cognitive Assessment Intervention (Group)  -MB     Cognitive Assessment Intervention    Behavior/Mood Observations behavior appropriate to situation, WNL/WFL  -MB     Attention WNL/WFL  -MB     Pragmatics WNL/WFL  -MB     Problem Solving WNL/WFL  -MB     Executive Function Skills WNL/WFL  -MB     Reasoning WNL/WFL  -MB     Organization WNL/WFL  -MB       04/14/17 0736 04/13/17 1802    Rehab Evaluation    Document Type evaluation   see MAR  -AC     Subjective Information agree to therapy;complains of;pain;fatigue  -     General Information    Patient Profile Review yes  -AC     Onset of Illness/Injury or Date of Surgery Date 04/13/17  -     Referring Physician Dr. Mcgregor  -     General Observations sleeping upon entrance to room, 2L O2 per nc, son present, pt in no apparent distress  -     Pertinent History Of Current Problem t/f from [facility] with slurred speech, dizziness, lightheadedness, RUE  numbness and weakness with associated headache; brought to [facility] to r/o CVA  -AC     Precautions/Limitations fall precautions  -AC     Prior Level of Function independent:;all household mobility;community mobility;gait;transfer;bed mobility;ADL's;home management;cooking;cleaning;driving;shopping  -AC     Equipment Currently Used at Home cane, straight;walker, rolling  -AC     Plans/Goals Discussed With patient and family;agreed upon  -AC     Risks Reviewed patient and family:;LOB;dizziness;increased discomfort;change in vital signs;lines disloged  -AC     Benefits Reviewed patient and family:;improve function;increase independence;increase strength;increase balance;decrease pain;increase knowledge  -AC     Barriers to Rehab none identified  -AC     Living Environment    Lives With child(dhaval), adult  -AC child(dhaval), adult  -KJ    Living Arrangements apartment  -AC apartment  -KJ    Home Accessibility bed and bath on same level;other (see comments)   walk in shower  - no concerns  -KJ    Stair Railings at Home  none  -KJ    Type of Financial/Environmental Concern  none  -KJ    Transportation Available  none  -KJ    Living Environment Comment son present 24/7  -AC     Pain Assessment    Pain Assessment 0-10  -AC     Pain Score 7  -AC     Pain Type Chronic pain  -AC     Pain Location Back  -AC     Pain Descriptors Aching;Dull  -AC     Pain Frequency Constant/continuous  -AC     Pain Intervention(s) Repositioned;Ambulation/increased activity  -AC     Response to Interventions tolerated  -AC     Vision Assessment/Intervention    Visual Impairment WFL  -AC     Cognitive Assessment/Intervention    Current Cognitive/Communication Assessment functional  -AC     Orientation Status oriented x 4  -AC     Follows Commands/Answers Questions 100% of the time;able to follow multi-step instructions  -AC     Personal Safety WNL/WFL  -AC     Personal Safety Interventions fall prevention program maintained;gait belt;nonskid  shoes/slippers when out of bed;supervised activity  -AC     ROM (Range of Motion)    General ROM Detail WFL AROM BUE  -AC     MMT (Manual Muscle Testing)    General MMT Assessment Detail 4+/5 grossly, some slight weakness in R shoulder due to previous rotator cuff sx per pt  -AC     Muscle Tone Assessment    Muscle Tone Assessment --   WNL  -AC     Bed Mobility, Assessment/Treatment    Bed Mobility, Scoot/Bridge, [place] supervision required  -AC     Bed Mob, Supine to Sit, [place] supervision required  -AC     Transfer Assessment/Treatment    Transfers, Sit-Stand [place] supervision required  -AC     Transfers, Stand-Sit [place] supervision required  -AC     Toilet Transfer, [place] supervision required  -AC     Motor Skills/Interventions    Additional Documentation Balance Skills Training (Group);Fine Motor Coordination Training (Group);Gross Motor Coordination Training (Group)  -AC     Balance Skills Training    Sitting-Level of Assistance Independent  -AC     Sitting-Balance Support Feet supported;Feet unsupported  -AC     Standing-Level of Assistance Close supervision  -AC     Gait Balance-Level of Assistance Contact guard  -AC     Fine Motor Coordination Training    Opposition Right:;Left:;intact  -AC     Gross Motor Coordination Training    Gross Motor Skill, Impairments Detail ROSALIND intact B, finger nose finger testing intact B  -AC     Sensory Assessment/Intervention    Sensory Impairment --   no sensory impairment per pt  -AC     Positioning and Restraints    Pre-Treatment Position in bed  -AC     Post Treatment Position bed  -AC     In Bed sitting EOB;call light within reach;encouraged to call for assist;with family/caregiver;side rails up x2  -AC       04/13/17 0788       General Information    Equipment Currently Used at Home cane, straight  -KJ       User Key  (r) = Recorded By, (t) = Taken By, (c) = Cosigned By    Initials Name Provider Type    TRICE Whitlock, CCC-SLP  Speech and Language Pathologist    AC Manuel Tee, OTR/L Occupational Therapist    MS Annabelle Gonzalez, PT DPT Physical Therapist    KP Ana León, BSW     KJ Estelle Maynard, RN Registered Nurse    EK Bettye Crump, PT Student PT Student          Physical Therapy Education     Title: PT OT SLP Therapies (Done)     Topic: Physical Therapy (Resolved)     Point: Mobility training (Resolved)    Learning Progress Summary    Learner Readiness Method Response Comment Documented by Status   Patient Acceptance E VU Education on importance of continued mobility and proper body mechanics for bed mobility. EK 04/14/17 1527 Done               Point: Body mechanics (Resolved)    Learning Progress Summary    Learner Readiness Method Response Comment Documented by Status   Patient Acceptance E VU Education on importance of continued mobility and proper body mechanics for bed mobility. EK 04/14/17 1527 Done                      User Key     Initials Effective Dates Name Provider Type Discipline    EK 03/23/16 -  Bettye Crump, PT Student PT Student PT                PT Recommendation and Plan  Anticipated Discharge Disposition: home  PT Frequency: evaluation only  Plan of Care Review  Plan Of Care Reviewed With: patient  Outcome Summary/Follow up Plan: PT evaluation complete. Patient demonstrates independence with transfers and gait. Patient does not currently demonstrate any functional deficits and does not require skilled PT. Patient plans to discharge to home with assist from son if necessary.              Outcome Measures       04/14/17 1500 04/14/17 0817       How much help from another person do you currently need...    Turning from your back to your side while in flat bed without using bedrails? 4  -MS (r) EK (t) MS (c)      Moving from lying on back to sitting on the side of a flat bed without bedrails? 4  -MS (r) EK (t) MS (c)      Moving to and from a bed to a chair (including a wheelchair)? 4  -MS  (r) EK (t) MS (c)      Standing up from a chair using your arms (e.g., wheelchair, bedside chair)? 4  -MS (r) EK (t) MS (c)      Climbing 3-5 steps with a railing? 4  -MS (r) EK (t) MS (c)      To walk in hospital room? 4  -MS (r) EK (t) MS (c)      AM-PAC 6 Clicks Score 24  -MS (r) EK (t)      How much help from another is currently needed...    Putting on and taking off regular lower body clothing?  4  -AC     Bathing (including washing, rinsing, and drying)  4  -AC     Toileting (which includes using toilet bed pan or urinal)  4  -AC     Putting on and taking off regular upper body clothing  4  -AC     Taking care of personal grooming (such as brushing teeth)  4  -AC     Eating meals  4  -AC     Score  24  -AC     Functional Assessment    Outcome Measure Options AM-PAC 6 Clicks Basic Mobility (PT)  -MS (r) EK (t) MS (c) AM-PAC 6 Clicks Daily Activity (OT)  -AC       User Key  (r) = Recorded By, (t) = Taken By, (c) = Cosigned By    Initials Name Provider Type    AC Manuel Tee, OTR/L Occupational Therapist    MS Annabelle Gonzalez, PT DPT Physical Therapist    FAUSTINO Crump, PT Student PT Student           Time Calculation:         PT Charges       04/14/17 1530          Time Calculation    Start Time 1501   additional time from 0742 to 0755  -MS (r) EK (t) MS (c)      Stop Time 1519  -MS (r) EK (t) MS (c)      Time Calculation (min) 18 min  -MS (r) EK (t)      PT Received On 04/14/17  -MS (r) EK (t) MS (c)        User Key  (r) = Recorded By, (t) = Taken By, (c) = Cosigned By    Initials Name Provider Type    MS Annabelle Goznalez, PT DPT Physical Therapist    FAUSTINO Crump, PT Student PT Student          Therapy Charges for Today     Code Description Service Date Service Provider Modifiers Qty    59072404417 HC PT EVAL LOW COMPLEXITY 2 4/14/2017 Bettye Crump, PT Student GP, KX 1          PT G-Codes  Outcome Measure Options: AM-PAC 6 Clicks Basic Mobility (PT)  Score: 24  Functional Limitation:  Mobility: Walking and moving around  Mobility: Walking and Moving Around Current Status (): 0 percent impaired, limited or restricted  Mobility: Walking and Moving Around Goal Status (): 0 percent impaired, limited or restricted  Mobility: Walking and Moving Around Discharge Status (): 0 percent impaired, limited or restricted    PT Discharge Summary  Anticipated Discharge Disposition: home  Reason for Discharge: At baseline function  Outcomes Achieved: Refer to plan of care for updates on goals achieved  Discharge Destination: Home with assist    Bettye Crump, PT Student  4/14/2017

## 2017-04-14 NOTE — PROGRESS NOTES
[facility] Medicine Services  INPATIENT PROGRESS NOTE    Length of Stay: 1  Date of Admission: 4/13/2017  Primary Care Physician: Dilan Lau MD    Subjective   Chief Complaint: CVA  HPI   The patient was admitted by the neurology service for an acute CVA.    Today, she says that all her deficits have resolved.  She wants to go home tomorrow.    She has no complaints for me at the moment.  I took care of the patient April 2015.    Review of Systems     All pertinent negatives and positives are as above. All other systems have been reviewed and are negative unless otherwise stated.     Objective    Temp:  [97.9 °F (36.6 °C)-98.2 °F (36.8 °C)] 98 °F (36.7 °C)  Heart Rate:  [59-66] 64  Resp:  [16-24] 24  BP: ()/(57-93) 121/69  Physical Exam   Constitutional: She is oriented to person, place, and time. She appears well-developed and well-nourished.   Up in bed.  No distress.  Her son is present with her.   HENT:   Head: Normocephalic and atraumatic.   Eyes: Conjunctivae and EOM are normal. Pupils are equal, round, and reactive to light.   Cardiovascular: Normal rate, regular rhythm, normal heart sounds and intact distal pulses.  Exam reveals no gallop and no friction rub.    No murmur heard.  Pulmonary/Chest: Effort normal and breath sounds normal. No respiratory distress. She has no wheezes. She has no rales. She exhibits no tenderness.   Abdominal: Soft. Bowel sounds are normal. She exhibits no distension. There is no tenderness. There is no rebound and no guarding.   Musculoskeletal: Normal range of motion. She exhibits no edema, tenderness or deformity.   Neurological: She is alert and oriented to person, place, and time. She displays normal reflexes. No cranial nerve deficit. She exhibits normal muscle tone.   Intact with no deficits.   Skin: Skin is warm and dry. No rash noted.   Psychiatric: She has a normal mood and affect. Her behavior is normal. Judgment and  thought content normal.     Results Review:  I have reviewed the labs, radiology results, and diagnostic studies.    Laboratory Data:     Results from last 7 days  Lab Units 04/14/17  1158 04/14/17  0506 04/13/17  1830   WBC 10*3/mm3  --  14.55* 16.67*   HEMOGLOBIN g/dL  --  12.6 13.2   HEMATOCRIT % 41.6 38.7 39.5   PLATELETS 10*3/mm3 373 331 349        Results from last 7 days  Lab Units 04/14/17  0506 04/13/17  1830   SODIUM mmol/L 138 140   POTASSIUM mmol/L 4.2 4.6   CHLORIDE mmol/L 107 105   TOTAL CO2 mmol/L 25.0 24.0   BUN mg/dL 18 18   CREATININE mg/dL 1.43* 1.54*   CALCIUM mg/dL 8.2* 8.5   BILIRUBIN mg/dL 0.5 0.7   ALK PHOS U/L 113 115   ALT (SGPT) U/L 17 <15   AST (SGOT) U/L 23 28   GLUCOSE mg/dL 137* 124*     Radiology Data:   Imaging Results (last 24 hours)     Procedure Component Value Units Date/Time    US Carotid Bilateral [[id number]] Updated:  04/14/17 0903      Impression:  1. There is less than 50% stenosis of the right internal carotid artery.  2. There is less than 50% stenosis of the left internal carotid artery.  3. Antegrade flow is demonstrated in bilateral vertebral arteries.    ECHO Interpretation Summary   · Left ventricular wall thickness is consistent with mild-to-moderate concentric hypertrophy.  · Left ventricular function is normal. Estimated EF appears to be in the range of 61 - 65%.  · Patent foramen ovale present.  · Estimated right ventricular systolic pressure from tricuspid regurgitation is normal (<35 mmHg).         I have reviewed the patient current medications.     Assessment/Plan   Assessment:   1.  Acute ischemic stroke in left parietal area likely thrombotic in nature.   2.  Systemic arterial hypertension.  3.  Hyperlipidemia.  4.  Type II diabetes mellitus with a hemoglobin A1c of 8.6.  5.  Likely chronic kidney disease (had a serum creatinine of 1.28 in 2015).  6.  Tobacco abuse.  7.  Coronary artery disease, status post percutaneous coronary intervention in the past.   8.   Leukocytosis.     Plan:   Aspirin and Plavix. Atorvastatin.    Lisinopril and Toprol-XL.    Hold Lasix. IVF.     She is chronically on Januvia and sliding scale insulin has been added.  She just starting taking Bydureon as an outpatient.  She had severe diarrhea with metformin 2 years ago, which put her into acute renal failure.  She never wants to take it again.  I would continue to let her primary care physician adjust her diabetic regimen as an outpatient.  She also seems to feel more comfortable with this.    Labs in the morning.     Rehab services.     Discharge Planning: Per the neurology service.  Likely home tomorrow.    Dilan Patiño,    04/14/17   3:15 PM

## 2017-04-14 NOTE — THERAPY DISCHARGE NOTE
Acute Care - Speech Language Pathology Initial Eval/Discharge  [facility]     Patient Name: Kerry Woodruff  : 1955  MRN: [id number]  Today's Date: 2017  Onset of Illness/Injury or Date of Surgery Date: (P) 17  Date of Referral to SLP: 17         Admit Date: 2017    Speech-Language/Cognitive-Linguistic Evaluation  Subjective: The patient was seen on this date for a Cognitive-Linguistic Evaluation.  Patient was alert and cooperative.    The patient's history is significant for TIA vs CVA, COPD, DM, hypothyroidism, HTN.   Objective: The patient was assessed utilizing informal assessment Findings were as follows:  Assessment: The patient demonstrated functional speech-language skills and functional cognition. No significant deficits were observed.   Recommendations:speech therapy not warranted.  Other Recommended Evaluations: none    Speech Therapy is not recommended.   It is anticipated patient will not need continued speech-language pathology services at the next level of care.   Ana Whitlock CCC-SLP 2017 2:16 PM    Visit Dx:    ICD-10-CM ICD-9-CM   1. Impaired cognition R41.89 294.9   2. Chest pain in adult R07.9 786.50     Patient Active Problem List   Diagnosis   • ST elevation myocardial infarction involving right coronary artery   • CAD in native artery   • Essential hypertension   • Mixed hyperlipidemia   • Chest pain in adult   • Stroke     Past Medical History:   Diagnosis Date   • Arthritis    • Asthma    • CAD (coronary artery disease)    • CHF (congestive heart failure)    • COPD (chronic obstructive pulmonary disease)    • Diabetes mellitus    • Disease of thyroid gland    • Heart disease    • Hiatal hernia    • History of transfusion    • Hypertension    • Injury of back    • Lung disease    • Nephrolithiasis    • Neuralgia    • Renal failure    • Sepsis    • Stroke 2017     Past Surgical History:   Procedure Laterality Date   • ADENOIDECTOMY     • CARDIAC  CATHETERIZATION     • CARDIAC CATHETERIZATION N/A 2016    Procedure: Coronary angiography;  Surgeon: Ozzy Johnson MD;  Location:  [facility] CATH [facility]VASIVE [facility];  Service:    •  SECTION      X 2   • CHOLECYSTECTOMY     • CORONARY ANGIOPLASTY     • GASTRIC RESECTION     • GASTROPLASTY     • HYSTERECTOMY     • NJ RT/LT HEART CATHETERS N/A 2016    Procedure: Percutaneous Coronary Intervention;  Surgeon: Ozzy Johnson MD;  Location:  [facility] CATH INVASIVE LOCATION;  Service: Cardiovascular   • SPLENECTOMY            SLP EVALUATION (last 72 hours)      SLP Evaluation       17 1342                Rehab Evaluation    Document Type evaluation  -MB        Subjective Information no complaints;agree to therapy  -MB        General Information    Patient Profile Review yes  -MB        Onset of Illness/Injury 17  -MB        Pertinent History Of Current Problem TIA vs CVA, COPD, DM, hypothyroidism, HTN  -MB        Current Diet Limitations no diet restrictions  -MB        Precautions/Limitations, Vision WFL  -MB        Precautions/Limitations, Hearing WFL  -MB        Prior Level of Function- Communication functional in all spheres  -MB        Prior Level of Function- Swallowing no diet consistency restrictions  -MB        Plans/Goals Discussed With patient and family  -MB        Barriers to Rehab none identified  -MB        Clinical Impression    Date of Referral to SLP 17  -MB        SLP Diagnosis WFL  -MB        Patient's Goals For Discharge patient did not state  -MB        Criteria for Skilled Therapeutic Interventions Met no problems identified which require skilled intervention  -MB        Therapy Frequency evaluation only  -MB        Anticipated Discharge Disposition home  -MB        Cognitive Assessment/Intervention    Current Cognitive/Communication Assessment functional  -MB        Orientation Status oriented to;person;place;time  -MB        Follows Commands/Answers  Questions 100% of the time  -MB        Additional Documentation Cognitive Assessment Intervention (Group)  -MB        Cognitive Assessment Intervention    Behavior/Mood Observations behavior appropriate to situation, [facility]  -MB        Attention [facility]  -MB        Pragmatics [facility]  -MB        Problem Solving [facility]  -MB        Executive Function Skills [facility]  -MB        Reasoning [facility]  -MB        Organization [facility]  -MB        Communication Assessment/Intervention    Additional Documentation Auditory Comprehen Assess/Intervention (Group);Verbal Expression Assess/Intervention (Group)  -MB        Auditory Comprehen Assess/Intervention    Able to Identify Objects [facility]  -MB        Answers Yes/No Questions [facility]  -MB        Able to Follow Commands [facility]  -MB        Verbal Expression Assess/Intervention    Automatic Speech [facility]  -MB        Speech Fluency fluent speech  -MB        Conversational Speech [facility]  -MB        Motor Speech Assess/Intervention    Motor Speech-Apraxia Observations no concerns  -MB          User Key  (r) = Recorded By, (t) = Taken By, (c) = Cosigned By    Initials Name Effective Dates    TRICE Whitlock CCC-SLP 08/02/16 -            EDUCATION  The patient has been educated in the following areas:   Cognitive Impairment Communication Impairment.    SLP Recommendation and Plan  SLP Diagnosis: WFL        Criteria for Skilled Therapeutic Interventions Met: no problems identified which require skilled intervention  Anticipated Discharge Disposition: home     Therapy Frequency: evaluation only          Plan of Care Review  Plan Of Care Reviewed With: patient, family  Outcome Summary/Follow up Plan: Pt passed the swallow screening and is on a regular consistency diet. Speech/language/cognitive eval completed. Pt had no significant difficulty. Pt was ultimately WFL. Speech therapy services are not warranted at this time. Reconsult for any new concerns.           SLP Outcome  Measures (last 72 hours)      SLP Outcome Measures       04/14/17 1400          SLP Outcome Measures    Outcome Measure Used? Adult NOMS  -MB      FCM Scores    FCM Chosen Motor Speech  -MB      Motor Speech FCM Score 7  -MB        User Key  (r) = Recorded By, (t) = Taken By, (c) = Cosigned By    Initials Name Effective Dates    BAKARI Wilson 08/02/16 -           Time Calculation:         Time Calculation- SLP       04/14/17 1415          Time Calculation- SLP    SLP Start Time 1332  -MB      SLP Stop Time 1415  -MB      SLP Time Calculation (min) 43 min  -MB      SLP Received On 04/14/17  -MB        User Key  (r) = Recorded By, (t) = Taken By, (c) = Cosigned By    Initials Name Provider Type    BAKARI Wilson Speech and Language Pathologist          Therapy Charges for Today     Code Description Service Date Service Provider Modifiers Qty    00924294369 HC ST MOTOR SPEECH CURRENT 4/14/2017 Ana Whitlock CCC-SLP GN, CH 1    21661359867 HC ST MOTOR SPEECH GOAL STATUS 4/14/2017 Ana Whitlock CCC-SLP GN, CH 1    24034428782 HC ST MOTOR SPEECH DISCHARGE 4/14/2017 Ana Whitlock CCC-SLP GN, CH 1    61281080725 HC ST EVAL SPEECH AND PROD W LANG  3 4/14/2017 Ana Whitlock CCC-SLP GN, KX 1             ADULT NOMS (last 72 hours)      Adult NOMS       04/14/17 1400                FCM Scores    FCM Chosen Motor Speech  -MB        Motor Speech FCM Score 7  -MB          User Key  (r) = Recorded By, (t) = Taken By, (c) = Cosigned By    Initials Name Effective Dates    TRICE Whitlock CCC-BRENDON 08/02/16 -         SLP G-Codes  SLP NOMS Used?: Yes  Functional Limitations: Motor speech  Motor Speech Current Status (): 0 percent impaired, limited or restricted  Motor Speech Goal Status (): 0 percent impaired, limited or restricted  Motor Speech Discharge Status (): 0 percent impaired, limited or restricted    SLP Discharge Summary  Anticipated Discharge Disposition:  home  Reason for Discharge: At baseline function    Ana Whitlock CCC-SLP  4/14/2017

## 2017-04-14 NOTE — H&P
Neurology History & Physical    Indication for Admission: stroke    History of present illness:  This is a very elvi 61-year-old female with a past medical history significant for 2 myocardial infarction status post stenting, diabetes mellitus, hypertension, chronic obstruct pulmonary disease, and congestive heart failure who presents with a new acute ischemic stroke.  She is never had a stroke before.  She recalls yesterday midmorning she got off the couch and noted that she had right arm and leg weakness.  She attempted to stand her right leg would not hold her.  She denies vision changes.  She denies facial drooping.  She denies speech changes.  As she could not walk she called EMS.  They took her to the hospital at [facility].  Their her symptoms resolved back to baseline and therefore she do not receive TPA.  A stat head CT was unremarkable.  An MRI showed an acute infarction in the left parietal-occipital junction which is small in nature and can be seen below in the note.  She was transferred here for further neurologic care.  She reports that she has poor practices in diet and exercise.  She is morbidly obese.  She continues to smoke.  She is tried in the past to quit but then always resumes this.  She currently lives with her son.  Her primary care physician Dr. Cooper started her on a new diabetic medication last week.    Past Medical History:   Diagnosis Date   • Arthritis    • Asthma    • CAD (coronary artery disease)    • CHF (congestive heart failure)    • COPD (chronic obstructive pulmonary disease)    • Diabetes mellitus    • Disease of thyroid gland    • Heart disease    • Hiatal hernia    • History of transfusion    • Hypertension    • Injury of back    • Lung disease    • Nephrolithiasis    • Neuralgia    • Renal failure    • Sepsis    • Stroke 4/13/2017       Allergies   Allergen Reactions   • Hydrocodone Hives and Itching   • Ibuprofen Hives     Prescriptions Prior to Admission    Medication Sig Dispense Refill Last Dose   • albuterol (PROVENTIL HFA;VENTOLIN HFA) 108 (90 BASE) MCG/ACT inhaler Inhale 2 puffs Every 4 (Four) Hours As Needed for wheezing.   Past Week at Unknown time   • aspirin 81 MG chewable tablet Chew 1 tablet Daily.   4/13/2017 at 0700   • clopidogrel (PLAVIX) 75 MG tablet Take 1 tablet by mouth Daily. 30 tablet 11 4/12/2017 at Unknown time   • Cyanocobalamin (B-12) 1000 MCG/ML kit Inject 1,000 mcg as directed 1 (One) Time Per Week. On mondays.   4/10/2017 at Unknown time   • diazePAM (VALIUM) 5 MG tablet Take 5 mg by mouth 2 (Two) Times a Day As Needed for anxiety.   4/12/2017 at Unknown time   • DULoxetine (CYMBALTA) 30 MG capsule Take 30 mg by mouth Daily.   4/12/2017 at Unknown time   • esomeprazole (nexIUM) 40 MG capsule Take 40 mg by mouth 2 (Two) Times a Day.   4/12/2017 at Unknown time   • folic acid (FOLVITE) 1 MG tablet Take 1 mg by mouth Daily.   4/12/2017 at Unknown time   • furosemide (LASIX) 20 MG tablet Take 20 mg by mouth Daily As Needed.   Past Week at Unknown time   • lisinopril (PRINIVIL,ZESTRIL) 20 MG tablet Take 20 mg by mouth Daily.   4/12/2017 at Unknown time   • metoprolol succinate XL (TOPROL-XL) 50 MG 24 hr tablet Take 50 mg by mouth Daily.   4/12/2017 at Unknown time   • oxyCODONE-acetaminophen (PERCOCET)  MG per tablet Take 1 tablet by mouth Every 6 (Six) Hours As Needed for moderate pain (4-6).   4/12/2017 at Unknown time   • potassium chloride (K-DUR) 10 MEQ CR tablet Take 20 mEq by mouth 2 (Two) Times a Day.   4/12/2017 at Unknown time   • pregabalin (LYRICA) 100 MG capsule Take 100 mg by mouth 3 (Three) Times a Day.   4/12/2017 at Unknown time   • promethazine (PHENERGAN) 25 MG tablet Take 25 mg by mouth Every 6 (Six) Hours As Needed for nausea or vomiting.   Past Week at Unknown time   • rOPINIRole (REQUIP) 0.5 MG tablet Take 0.5 mg by mouth Every Night. Take 1 hour before bedtime.    4/12/2017 at Unknown time   • rosuvastatin  (CRESTOR) 10 MG tablet Take 10 mg by mouth Daily.   4/12/2017 at Unknown time   • SITagliptin (JANUVIA) 25 MG tablet Take 25 mg by mouth Daily.   4/12/2017 at Unknown time   • Vitamin D, Cholecalciferol, (CHOLECALCIFEROL) 400 UNITS tablet Take 400 Units by mouth Daily.   4/12/2017 at Unknown time       Social History     Social History   • Marital status: Single     Spouse name: N/A   • Number of children: N/A   • Years of education: N/A     Occupational History   • Not on file.     Social History Main Topics   • Smoking status: Current Every Day Smoker     Packs/day: 1.00   • Smokeless tobacco: Never Used   • Alcohol use No   • Drug use: No   • Sexual activity: No     Other Topics Concern   • Not on file     Social History Narrative     Family History   Problem Relation Age of Onset   • Coronary artery disease Maternal Grandmother    • Alzheimer's disease Mother    • Heart disease Daughter        Review of Systems  Review of Systems - a 14 point review of systems performed.  Pertinent negatives include palpitations, chest pain, and alteration of consciousness.  Pertinent positives include only unilateral weakness which is now resolved.    Vital Signs   Temp:  [97.9 °F (36.6 °C)-98.2 °F (36.8 °C)] 97.9 °F (36.6 °C)  Heart Rate:  [59-66] 66  Resp:  [16-18] 18  BP: ()/(57-93) 119/59    General Exam: Morbidly obese  Head:  Normal cephalic, atraumatic  HEENT:  Neck supple  Fundoscopic Exam:  No signs of disc edema  CVS:  Regular rate and rhythm.  No murmurs  Carotid Examination:  No bruits  Lungs:  Clear to auscultation  Abdomen:  Non-tender, Non-distended  Extremities:  No signs of peripheral edema  Skin:  No rashes    Neurologic Exam:    Mental Status:    -Awake, Alert, Oriented X 3  -No word finding difficulties  -No aphasia  -No dysarthria  -Follows simple and complex commands    CN II:  Visual fields full.  Pupils equally reactive to light  CN III, IV, VI:  Extraocular Muscles full with no signs of  nystagmus  CN V:  Facial sensory is symmetric with no asymetries.  CN VII:  Facial motor symmetric  CN VIII:  Gross hearing intact bilaterally  CN IX:  Palate elevates symmetrically  CN X:  Palate elevates symmetrically  CN XI:  Shoulder shrug symmetric  CN XII:  Tongue is midline on protrusion    Motor: (strength out of 5:  1= minimal movement, 2 = movement in plane of gravity, 3 = movement against gravity, 4 = movement against some resistance, 5 = full strength)    -Right Upper Ext: Proximal: 5 Distal: 5  -Left Upper Ext: Proximal: 5 Distal: 5    -Right Lower Ext: Proximal: 5 Distal: 5  -Left Lower Ext: Proximal: 5 Distal: 5    DTR:  1+ reflexes throughout in all 4 extremities     Sensory:  -Intact to light touch, pinprick, temperature, pain, and proprioception    Coordination:  -Finger to nose intact  -Heel to shin intact  -No ataxia    Gait  -No signs of ataxia  -ambulates unassisted      Results Review:  Lab Results (last 7 days)     Procedure Component Value Units Date/Time    POC Glucose Fingerstick [[id number]]  (Normal) Collected:  04/13/17 1838    Specimen:  Blood Updated:  04/13/17 1850     Glucose 124 mg/dL       [facility] [id number] [id number]       CBC (No Diff) [[id number]]  (Abnormal) Collected:  04/13/17 1830    Specimen:  Blood Updated:  04/13/17 1852     WBC 16.67 (H) 10*3/mm3      RBC 4.18 (L) 10*6/mm3      Hemoglobin 13.2 g/dL      Hematocrit 39.5 %      MCV 94.5 fL      MCH 31.6 pg      MCHC 33.4 g/dL      RDW 15.5 (H) %      RDW-SD 53.4 fl      MPV 9.9 fL      Platelets 349 10*3/mm3     Comprehensive Metabolic Panel [[id number]]  (Abnormal) Collected:  04/13/17 1830    Specimen:  Blood Updated:  04/13/17 1905     Glucose 124 (H) mg/dL      BUN 18 mg/dL      Creatinine 1.54 (H) mg/dL      Sodium 140 mmol/L      Potassium 4.6 mmol/L      Chloride 105 mmol/L      CO2 24.0 mmol/L      Calcium 8.5 mg/dL      Total Protein 6.8 g/dL      Albumin 3.10 (L) g/dL      ALT (SGPT) <15 U/L       AST (SGOT) 28 U/L      Alkaline Phosphatase 115 U/L      Total Bilirubin 0.7 mg/dL      eGFR Non African Amer 34 (L) mL/min/1.73      Globulin 3.7 gm/dL      A/G Ratio 0.8 (L) g/dL      BUN/Creatinine Ratio 11.7     Anion Gap 11.0 mmol/L     Troponin [[id number]]  (Normal) Collected:  04/13/17 1830    Specimen:  Blood Updated:  04/13/17 1916     Troponin I 0.013 ng/mL     POC Glucose Fingerstick [[id number]]  (Abnormal) Collected:  04/14/17 0007    Specimen:  Blood Updated:  04/14/17 0018     Glucose 140 (H) mg/dL       : [id number] [facility] ThucMeter ID: [id number]       CBC (No Diff) [[id number]]  (Abnormal) Collected:  04/14/17 0506    Specimen:  Blood Updated:  04/14/17 0529     WBC 14.55 (H) 10*3/mm3      RBC 4.07 (L) 10*6/mm3      Hemoglobin 12.6 g/dL      Hematocrit 38.7 %      MCV 95.1 fL      MCH 31.0 pg      MCHC 32.6 (L) g/dL      RDW 15.7 (H) %      RDW-SD 54.4 (H) fl      MPV 9.8 fL      Platelets 331 10*3/mm3     Comprehensive Metabolic Panel [[id number]]  (Abnormal) Collected:  04/14/17 0506    Specimen:  Blood Updated:  04/14/17 0534     Glucose 137 (H) mg/dL      BUN 18 mg/dL      Creatinine 1.43 (H) mg/dL      Sodium 138 mmol/L      Potassium 4.2 mmol/L      Chloride 107 mmol/L      CO2 25.0 mmol/L      Calcium 8.2 (L) mg/dL      Total Protein 6.5 g/dL      Albumin 3.00 (L) g/dL      ALT (SGPT) 17 U/L      AST (SGOT) 23 U/L      Alkaline Phosphatase 113 U/L      Total Bilirubin 0.5 mg/dL      eGFR Non African Amer 38 (L) mL/min/1.73      Globulin 3.5 gm/dL      A/G Ratio 0.9 (L) g/dL      BUN/Creatinine Ratio 12.6     Anion Gap 6.0 mmol/L     Lipid Panel [[id number]]  (Abnormal) Collected:  04/14/17 0506    Specimen:  Blood Updated:  04/14/17 0545     Total Cholesterol 183 mg/dL      Triglycerides 500 (H) mg/dL      HDL Cholesterol 25 (L) mg/dL      LDL Cholesterol  110 (H) mg/dL      LDL/HDL Ratio --      Unable to calculate       POC Glucose Fingerstick [[id number]]  (Abnormal) Collected:  04/14/17  0541    Specimen:  Blood Updated:  04/14/17 0552     Glucose 140 (H) mg/dL       : [id number] [facility] ThucMeter ID: [id number]       TSH [[id number]]  (Normal) Collected:  04/14/17 0506    Specimen:  Blood Updated:  04/14/17 0614     TSH 0.912 mIU/mL     Hemoglobin A1c [[id number]] Collected:  04/14/17 0506    Specimen:  Blood Updated:  04/14/17 0652     Hemoglobin A1C 8.6 %     Narrative:       Less than 6.0           Non-Diabetic Range  6.0-7.0                 ADA Therapeutic Target  Greater than 7.0        Action Suggested        Patient Active Problem List   Diagnosis   • ST elevation myocardial infarction involving right coronary artery   • CAD in native artery   • Essential hypertension   • Mixed hyperlipidemia   • Chest pain in adult   • Stroke           Impression:    1.  Acute ischemic stroke in left parietal area likely thrombotic in nature with risk factors including hypertension, hyperlipidemia, diabetes mellitus, tobacco usage, and morbid obesity.  --No tPA secondary to full resolution of symptoms.  2.  Acute kidney injury slightly improved with IV hydration overnight  3.  Uncontrolled diabetes mellitus and an A1c goal less than 7  4.  Leukocytosis somewhat improved from yesterday.  5.  Tobacco abuse  6.  Morbid obesity  7.  Hyperlipidemia with LDL goal less than 70    Plan:    1.  Continue aspirin and Plavix  2.  Transthoracic echocardiogram and carotid ultrasound  3.  Restart home medications  4.  Physical, occupational, and speech therapy consultations.  I have considered acute rehabilitation but will likely not needed given her for resolution of symptoms  5.  Diet and exercise counseling  6.  Stop home statin and will start Lipitor 80 mg daily  7.  Tobacco cessation counseling  8.  Weight loss recommended  9.  Gentle IV hydration secondary to acute kidney injury but will be mindful of history of congestive heart failure  10.  Urinalysis and urine culture given leukocytosis.  She is been afebrile since  admission  11.  Lovenox for DVT prophylaxis  12.  Plavix inhibition test  13.  We will continue Januvia but will also add insulin sliding scale.  As an outpatient will need to increase glycemic control with hemoglobin A1c goal less than 7    Jack Mcgregor MD  04/14/17  10:12 AM

## 2017-04-14 NOTE — PLAN OF CARE
Problem: Patient Care Overview (Adult)  Goal: Plan of Care Review  Outcome: Ongoing (interventions implemented as appropriate)    04/14/17 9999   Coping/Psychosocial Response Interventions   Plan Of Care Reviewed With patient   Outcome Evaluation   Outcome Summary/Follow up Plan PT evaluation complete. Patient demonstrates independence with transfers and gait. Patient does not currently demonstrate any functional deficits and does not require skilled PT. Patient plans to discharge to home with assist from son if necessary.

## 2017-04-14 NOTE — PLAN OF CARE
Problem: Patient Care Overview (Adult)  Goal: Plan of Care Review  Outcome: Ongoing (interventions implemented as appropriate)    04/14/17 1410   Coping/Psychosocial Response Interventions   Plan Of Care Reviewed With patient;family   Outcome Evaluation   Outcome Summary/Follow up Plan Pt passed the swallow screening and is on a regular consistency diet. Speech/language/cognitive eval completed. Pt had no significant difficulty. Pt was ultimately WFL. Speech therapy services are not warranted at this time. Reconsult for any new concerns.

## 2017-04-14 NOTE — THERAPY DISCHARGE NOTE
Acute Care - Occupational Therapy Initial Eval/Discharge  [facility]     Patient Name: Kerry Woodruff  : 1955  MRN: [id number]  Today's Date: 2017  Onset of Illness/Injury or Date of Surgery Date: (P) 17  Date of Referral to OT: 17  Referring Physician: (P) Jack Mcgregor MD      Admit Date: 2017       ICD-10-CM ICD-9-CM   1. Chest pain in adult R07.9 786.50     Patient Active Problem List   Diagnosis   • ST elevation myocardial infarction involving right coronary artery   • CAD in native artery   • Essential hypertension   • Mixed hyperlipidemia   • Chest pain in adult   • Stroke     Past Medical History:   Diagnosis Date   • Arthritis    • Asthma    • CAD (coronary artery disease)    • CHF (congestive heart failure)    • COPD (chronic obstructive pulmonary disease)    • Diabetes mellitus    • Disease of thyroid gland    • Heart disease    • Hiatal hernia    • History of transfusion    • Hypertension    • Injury of back    • Lung disease    • Nephrolithiasis    • Neuralgia    • Renal failure    • Sepsis    • Stroke 2017     Past Surgical History:   Procedure Laterality Date   • ADENOIDECTOMY     • CARDIAC CATHETERIZATION     • CARDIAC CATHETERIZATION N/A 2016    Procedure: Coronary angiography;  Surgeon: Ozzy Johnson MD;  Location: [facility] INVASIVE LOCATION;  Service:    •  SECTION      X 2   • CHOLECYSTECTOMY     • CORONARY ANGIOPLASTY     • GASTRIC RESECTION     • GASTROPLASTY     • HYSTERECTOMY     • ID RT/LT HEART CATHETERS N/A 2016    Procedure: Percutaneous Coronary Intervention;  Surgeon: Ozzy Johnson MD;  Location: [facility] CATH INVASIVE LOCATION;  Service: Cardiovascular   • SPLENECTOMY            OT ASSESSMENT FLOWSHEET (last 72 hours)      OT Evaluation       17 0822 17 0742 17 0736 17 1802 17 1758    Rehab Evaluation    Document Type  (P)  evaluation   see MAR  -EK evaluation   see MAR  -AC       Subjective Information   agree to therapy;complains of;pain;fatigue  -      General Information    Patient Profile Review  (P)  yes  -EK yes  -AC      Onset of Illness/Injury or Date of Surgery Date  (P)  04/13/17  -EK 04/13/17  -AC      Referring Physician  (P)  Jack Mcgregor MD  -EK Dr. Mcgregor  -      General Observations   sleeping upon entrance to room, 2L O2 per nc, son present, pt in no apparent distress  -AC      Pertinent History Of Current Problem  (P)  t/f from [facility] to [facility] to r/o CVA; pt presented with slurred speech, lightheadedness, dizziness, RUE weakness, and headache; CT of head negative for acute infarct;  Hx: CKD, HTN, hiatal hernia, hypothyroidism, DM, COPD, CHF, OA, asthma  -EK t/f from [facility] with slurred speech, dizziness, lightheadedness, RUE numbness and weakness with associated headache; brought to [facility] to r/o CVA  -AC      Precautions/Limitations  (P)  fall precautions  -EK fall precautions  -AC      Prior Level of Function   independent:;all household mobility;community mobility;gait;transfer;bed mobility;ADL's;home management;cooking;cleaning;driving;shopping  -      Equipment Currently Used at Home   cane, straight;walker, rolling  -AC  cane, straight  -KJ    Plans/Goals Discussed With   patient and family;agreed upon  -      Risks Reviewed   patient and family:;LOB;dizziness;increased discomfort;change in vital signs;lines disloged  -AC      Benefits Reviewed   patient and family:;improve function;increase independence;increase strength;increase balance;decrease pain;increase knowledge  -AC      Barriers to Rehab   none identified  -AC      Living Environment    Lives With   child(dhaval), adult  -AC child(dhaval), adult  -KJ     Living Arrangements   apartment  -AC apartment  -KJ     Home Accessibility   bed and bath on same level;other (see comments)   walk in shower  -AC no concerns  -KJ     Stair Railings at Home    none  -KJ      Type of Financial/Environmental Concern    none  -KJ     Transportation Available    none  -KJ     Living Environment Comment   son present 24/7  -      Clinical Impression    Date of Referral to OT   04/13/17  -      OT Diagnosis   decreased ADL  -AC      Prognosis   good  -AC      Impairments Found (describe specific impairments)   other (see comments)   none noted  -AC      Patient/Family Goals Statement   go home  -AC      Criteria for Skilled Therapeutic Interventions Met   no problems identified which require skilled intervention  -AC      Therapy Frequency   evaluation only  -AC      Anticipated Discharge Disposition home  -  home  -AC      Functional Level Prior    Ambulation     0-->independent  -KJ    Transferring     0-->independent  -KJ    Toileting     0-->independent  -KJ    Bathing     0-->independent  -KJ    Dressing     0-->independent  -KJ    Eating     0-->independent  -KJ    Communication     0-->understands/communicates without difficulty  -KJ    Swallowing     0-->swallows foods/liquids without difficulty  -KJ    Pain Assessment    Pain Assessment   0-10  -AC      Pain Score   7  -AC      Pain Type   Chronic pain  -AC      Pain Location   Back  -AC      Pain Descriptors   Aching;Dull  -AC      Pain Frequency   Constant/continuous  -AC      Pain Intervention(s)   Repositioned;Ambulation/increased activity  -AC      Response to Interventions   tolerated  -AC      Vision Assessment/Intervention    Visual Impairment   WFL  -AC      Cognitive Assessment/Intervention    Current Cognitive/Communication Assessment   functional  -AC      Orientation Status   oriented x 4  -AC      Follows Commands/Answers Questions   100% of the time;able to follow multi-step instructions  -AC      Personal Safety   WNL/WFL  -AC      Personal Safety Interventions   fall prevention program maintained;gait belt;nonskid shoes/slippers when out of bed;supervised activity  -AC      ROM (Range of Motion)    General  ROM Detail   WFL AROM BUE  -AC      MMT (Manual Muscle Testing)    General MMT Assessment Detail   4+/5 grossly, some slight weakness in R shoulder due to previous rotator cuff sx per pt  -AC      Muscle Tone Assessment    Muscle Tone Assessment   --   WNL  -AC      Bed Mobility, Assessment/Treatment    Bed Mobility, Scoot/Bridge, [place]   supervision required  -AC      Bed Mob, Supine to Sit, [place]   supervision required  -AC      Transfer Assessment/Treatment    Transfers, Sit-Stand [place]   supervision required  -AC      Transfers, Stand-Sit [place]   supervision required  -AC      Toilet Transfer, [place]   supervision required  -AC      Functional Mobility    Functional Mobility- Ind. Level   contact guard assist  -AC      Functional Mobility- Comment   to hallway, in bathroom, back to bed, pt ambulates slowly, however this is her first time up; no LOB noted  -AC      Lower Body Dressing Assessment/Training    LB Dressing Assess/Train, Clothing Type   donning:;doffing:;socks  -AC      LB Dressing Assess/Train, Position   edge of bed  -AC      LB Dressing Assess/Train, [place]   supervision required  -AC      Toileting Assessment/Training    Toileting Assess/Train, Position   sitting  -AC      Toileting Assess/Train, Indepen Level   supervision required  -AC      Grooming Assessment/Training    Grooming Assess/Train, Position   standing  -AC      Grooming Assess/Train, Indepen Level   supervision required  -AC      Motor Skills/Interventions    Additional Documentation   Balance Skills Training (Group);Fine Motor Coordination Training (Group);Gross Motor Coordination Training (Group)  -AC      Balance Skills Training    Sitting-Level of Assistance   Independent  -AC      Sitting-Balance Support   Feet supported;Feet unsupported  -AC      Standing-Level of Assistance   Close supervision  -AC      Gait Balance-Level of Assistance   Contact guard  -AC      Gross Motor Coordination  Training    Gross Motor Skill, Impairments Detail   ROSALIND intact B, finger nose finger testing intact B  -AC      Fine Motor Coordination Training    Opposition   Right:;Left:;intact  -AC      Sensory Assessment/Intervention    Sensory Impairment   --   no sensory impairment per pt  -AC      Positioning and Restraints    Pre-Treatment Position   in bed  -AC      Post Treatment Position   bed  -AC      In Bed   sitting EOB;call light within reach;encouraged to call for assist;with family/caregiver;side rails up x2  -AC        User Key  (r) = Recorded By, (t) = Taken By, (c) = Cosigned By    Initials Name Effective Dates     Manuel Tee OTR/L 06/22/15 -     PEGGY Maynard RN 09/12/16 -     FAUSTINO Crump, PT Student 03/23/16 -           Occupational Therapy Education     Title: PT OT SLP Therapies (Done)     Topic: Occupational Therapy (Done)     Point: ADL training (Done)    Description: Instruct learner(s) on proper safety adaptation and remediation techniques during self care or transfers.   Instruct in proper use of assistive devices.    Learning Progress Summary    Learner Readiness Method Response Comment Documented by Status   Patient Acceptance E VU Educated pt on neuro symptoms being an emergency, getting to the hospital within 3hrs if this happens again, OT POC  04/14/17 0820 Done                      User Key     Initials Effective Dates Name Provider Type Discipline     06/22/15 -  Manuel Tee OTR/L Occupational Therapist OT                OT Recommendation and Plan  Anticipated Discharge Disposition: home  Therapy Frequency: evaluation only                  Outcome Measures       04/14/17 0817          How much help from another is currently needed...    Putting on and taking off regular lower body clothing? 4  -AC      Bathing (including washing, rinsing, and drying) 4  -AC      Toileting (which includes using toilet bed pan or urinal) 4  -AC      Putting on and taking off regular  upper body clothing 4  -AC      Taking care of personal grooming (such as brushing teeth) 4  -AC      Eating meals 4  -AC      Score 24  -AC      Functional Assessment    Outcome Measure Options AM-PAC 6 Clicks Daily Activity (OT)  -AC        User Key  (r) = Recorded By, (t) = Taken By, (c) = Cosigned By    Initials Name Provider Type     Manuel Tee, OTR/L Occupational Therapist          Time Calculation:         Time Calculation- OT       04/14/17 0821          Time Calculation- OT    OT Start Time 0736  -AC      OT Stop Time 0816  -AC      OT Time Calculation (min) 40 min  -AC      OT Received On 04/14/17  -        User Key  (r) = Recorded By, (t) = Taken By, (c) = Cosigned By    Initials Name Provider Type     Manuel Tee OTR/L Occupational Therapist          Therapy Charges for Today     Code Description Service Date Service Provider Modifiers Qty    39480100419 HC OT SELFCARE CURRENT 4/14/2017 Manuel Tee OTR/L GO, CH 1    96588011197 HC OT SELFCARE PROJECTED 4/14/2017 Manuel Tee OTR/L GO, CH 1    51367666637 HC OT SELFCARE DISCHARGE 4/14/2017 Manuel Tee, OTR/L GO, CH 1    [id number]  OT EVAL LOW COMPLEXITY 3 4/14/2017 Manuel Tee OTR/L GO, KX 1          OT G-codes  OT Functional Scales Options: AM-PAC 6 Clicks Daily Activity (OT)  Score: 24  Functional Limitation: Self care  Self Care Current Status (): 0 percent impaired, limited or restricted  Self Care Goal Status (): 0 percent impaired, limited or restricted  Self Care Discharge Status (): 0 percent impaired, limited or restricted    OT Discharge Summary  Anticipated Discharge Disposition: home  Reason for Discharge: At baseline function  Outcomes Achieved: Other (1 time eval)  Discharge Destination: Home    YAZMIN Zapata/MARLY  4/14/2017

## 2017-04-14 NOTE — PROGRESS NOTES
Discharge Planning Assessment  [facility]     Patient Name: Kerry Woodruff  MRN: [id number]  Today's Date: 4/14/2017    Admit Date: 4/13/2017          Discharge Needs Assessment       04/14/17 1442    Living Environment    Lives With child(dhaval), adult   Son - Available 24/7    Living Arrangements house    Provides Primary Care For no one    Primary Care Provided By child(dhaval) (specify)   Son    Quality Of Family Relationships supportive;helpful;involved    Able to Return to Prior Living Arrangements yes    Discharge Needs Assessment    Concerns To Be Addressed no discharge needs identified;denies needs/concerns at this time    Readmission Within The Last 30 Days no previous admission in last 30 days    Equipment Currently Used at Home walker, standard;cane, straight    Equipment Needed After Discharge none    Transportation Available family or friend will provide;car    Discharge Disposition home or self-care    Discharge Planning Comments Patient lives with her son and he is available to assist if needed. Plan is for patient to return home. Patient has not shown need for inpatient rehab at time of discharge and denies needs.             Discharge Plan     None        Discharge Placement     No information found                Demographic Summary     None            Functional Status     None            Psychosocial     None            Abuse/Neglect     None            Legal     None            Substance Abuse     None            Patient Forms     None          FAVIOLA Gloria  Continued Stay Note  [facility]     Patient Name: Kerry Woodruff  MRN: [id number]  Today's Date: 4/14/2017    Admit Date: 4/13/2017          Discharge Plan     None              Discharge Codes     None            FAVIOLA Gloria

## 2017-04-14 NOTE — PAYOR COMM NOTE
"FROM: DOM JOLLY  PHONE: [phone]  FAX: [phone]    ID: [id number]    Kerry Fuller (61 y.o. Female)     Date of Birth Social Security Number Address Home Phone MRN    1955  [facility] [id number] [phone] [id number]    Advent Marital Status          None Single       Admission Date Admission Type Admitting Provider Attending Provider Department, Room/Bed    4/13/17 Urgent Jack Mcgregor MD Ashburn, Joseph C, MD [facility] 3A, 343/1    Discharge Date Discharge Disposition Discharge Destination                      Attending Provider: Jack Mcgregor MD     Allergies:  Hydrocodone, Ibuprofen    Isolation:  None   Infection:  None   Code Status:  FULL    Ht:  65\" (165.1 cm)   Wt:  262 lb 8 oz (119 kg)    Admission Cmt:  None   Principal Problem:  None                Active Insurance as of 4/13/2017     Primary Coverage     Payor Plan Insurance Group Employer/Plan Group    MEDICARE MEDICARE A & B      Payor Plan Address Payor Plan Phone Number Effective From Effective To    [address] [phone] 6/1/1997     [place] [address]       Subscriber Name Subscriber Birth Date Member ID       KERRY FULLER 1955 [id number]           Secondary Coverage     Payor Plan Insurance Group Employer/Plan Group    [employer] [place]ICA[place]      Payor Plan Address Payor Plan Phone Number Effective From Effective To    [address] [address] [phone] 11/1/2016     [place] [address]       Subscriber Name Subscriber Birth Date Member ID       KERRY FULLER 1955 [id number]                 Emergency Contacts      (Rel.) Home Phone Work Phone Mobile Phone    Tayler Espinal (Other) [phone] -- --    Jason Crain (Other) [phone] -- --            History & Physical     No notes of this type exist for this encounter.        Emergency Department Notes     No notes of this type exist for this encounter.        Vital Signs (last 24 " "hours)       04/13 0700  -  04/14 0659 04/14 0700  -  04/14 1008   Most Recent    Temp (°F) 97.9 -  98.2       --  Comment: gone for test    Heart Rate 59 -  66       66    Resp 16 -  18       18    BP 91/57 -  157/93       119/59    SpO2 (%) 95 -  98       95          Hospital Medications (all)       Dose Frequency Start End    aspirin suppository 300 mg 300 mg Daily 4/14/2017     Sig - Route: Insert 1 suppository into the rectum Daily. - Rectal    Linked Group 1:  \"Or\" Linked Group Details        aspirin tablet 325 mg 325 mg Daily 4/14/2017     Sig - Route: Take 1 tablet by mouth Daily. - Oral    Linked Group 1:  \"Or\" Linked Group Details        atorvastatin (LIPITOR) tablet 80 mg 80 mg Nightly 4/13/2017     Sig - Route: Take 2 tablets by mouth Every Night. - Oral    enoxaparin (LOVENOX) syringe 30 mg 30 mg Every 24 Hours 4/13/2017     Sig - Route: Inject 0.3 mL under the skin Daily. - Subcutaneous    labetalol (NORMODYNE,TRANDATE) injection 10 mg 10 mg Every 10 Minutes PRN 4/13/2017     Sig - Route: Infuse 2 mL into a venous catheter Every 10 (Ten) Minutes As Needed for High Blood Pressure (SBP > 220 with nicardipine at 15 mg/hr). - Intravenous    ondansetron (ZOFRAN) injection 4 mg 4 mg Every 6 Hours PRN 4/13/2017     Sig - Route: Infuse 2 mL into a venous catheter Every 6 (Six) Hours As Needed for Nausea or Vomiting. - Intravenous    oxyCODONE-acetaminophen (PERCOCET)  MG per tablet 1 tablet 1 tablet Every 6 Hours PRN 4/13/2017 4/23/2017    Sig - Route: Take 1 tablet by mouth Every 6 (Six) Hours As Needed for Moderate Pain (4-6). - Oral    sodium chloride 0.9 % flush 1-10 mL 1-10 mL As Needed 4/13/2017     Sig - Route: Infuse 1-10 mL into a venous catheter As Needed for Line Care. - Intravenous    sodium chloride 0.9 % infusion 75 mL/hr Continuous 4/13/2017     Sig - Route: Infuse 75 mL/hr into a venous catheter Continuous. - Intravenous            Lab Results (last 24 hours)     Procedure Component Value " Units Date/Time    POC Glucose Fingerstick [[id number]]  (Normal) Collected:  04/13/17 1838    Specimen:  Blood Updated:  04/13/17 1850     Glucose 124 mg/dL       : [id number] Caesar ManeseyMeter ID: [id number]       CBC (No Diff) [[id number]]  (Abnormal) Collected:  04/13/17 1830    Specimen:  Blood Updated:  04/13/17 1852     WBC 16.67 (H) 10*3/mm3      RBC 4.18 (L) 10*6/mm3      Hemoglobin 13.2 g/dL      Hematocrit 39.5 %      MCV 94.5 fL      MCH 31.6 pg      MCHC 33.4 g/dL      RDW 15.5 (H) %      RDW-SD 53.4 fl      MPV 9.9 fL      Platelets 349 10*3/mm3     Comprehensive Metabolic Panel [[id number]]  (Abnormal) Collected:  04/13/17 1830    Specimen:  Blood Updated:  04/13/17 1905     Glucose 124 (H) mg/dL      BUN 18 mg/dL      Creatinine 1.54 (H) mg/dL      Sodium 140 mmol/L      Potassium 4.6 mmol/L      Chloride 105 mmol/L      CO2 24.0 mmol/L      Calcium 8.5 mg/dL      Total Protein 6.8 g/dL      Albumin 3.10 (L) g/dL      ALT (SGPT) <15 U/L      AST (SGOT) 28 U/L      Alkaline Phosphatase 115 U/L      Total Bilirubin 0.7 mg/dL      eGFR Non African Amer 34 (L) mL/min/1.73      Globulin 3.7 gm/dL      A/G Ratio 0.8 (L) g/dL      BUN/Creatinine Ratio 11.7     Anion Gap 11.0 mmol/L     Troponin [[id number]]  (Normal) Collected:  04/13/17 1830    Specimen:  Blood Updated:  04/13/17 1916     Troponin I 0.013 ng/mL     POC Glucose Fingerstick [[id number]]  (Abnormal) Collected:  04/14/17 0007    Specimen:  Blood Updated:  04/14/17 0018     Glucose 140 (H) mg/dL       : [id number] Stewart JjucMeter ID: [id number]       CBC (No Diff) [[id number]]  (Abnormal) Collected:  04/14/17 0506    Specimen:  Blood Updated:  04/14/17 0529     WBC 14.55 (H) 10*3/mm3      RBC 4.07 (L) 10*6/mm3      Hemoglobin 12.6 g/dL      Hematocrit 38.7 %      MCV 95.1 fL      MCH 31.0 pg      MCHC 32.6 (L) g/dL      RDW 15.7 (H) %      RDW-SD 54.4 (H) fl      MPV 9.8 fL      Platelets 331 10*3/mm3     Comprehensive Metabolic Panel  [[id number]]  (Abnormal) Collected:  04/14/17 0506    Specimen:  Blood Updated:  04/14/17 0534     Glucose 137 (H) mg/dL      BUN 18 mg/dL      Creatinine 1.43 (H) mg/dL      Sodium 138 mmol/L      Potassium 4.2 mmol/L      Chloride 107 mmol/L      CO2 25.0 mmol/L      Calcium 8.2 (L) mg/dL      Total Protein 6.5 g/dL      Albumin 3.00 (L) g/dL      ALT (SGPT) 17 U/L      AST (SGOT) 23 U/L      Alkaline Phosphatase 113 U/L      Total Bilirubin 0.5 mg/dL      eGFR Non African Amer 38 (L) mL/min/1.73      Globulin 3.5 gm/dL      A/G Ratio 0.9 (L) g/dL      BUN/Creatinine Ratio 12.6     Anion Gap 6.0 mmol/L     Lipid Panel [[id number]]  (Abnormal) Collected:  04/14/17 0506    Specimen:  Blood Updated:  04/14/17 0545     Total Cholesterol 183 mg/dL      Triglycerides 500 (H) mg/dL      HDL Cholesterol 25 (L) mg/dL      LDL Cholesterol  110 (H) mg/dL      LDL/HDL Ratio --      Unable to calculate       POC Glucose Fingerstick [[id number]]  (Abnormal) Collected:  04/14/17 0541    Specimen:  Blood Updated:  04/14/17 0552     Glucose 140 (H) mg/dL       : [id number] Terry ThucMeter ID: [id number]       TSH [[id number]]  (Normal) Collected:  04/14/17 0506    Specimen:  Blood Updated:  04/14/17 0614     TSH 0.912 mIU/mL     Hemoglobin A1c [[id number]] Collected:  04/14/17 0506    Specimen:  Blood Updated:  04/14/17 0652     Hemoglobin A1C 8.6 %     Narrative:       Less than 6.0           Non-Diabetic Range  6.0-7.0                 ADA Therapeutic Target  Greater than 7.0        Action Suggested        Imaging Results (last 24 hours)     Procedure Component Value Units Date/Time    US Carotid Bilateral [[id number]] Updated:  04/14/17 0903        ECG/EMG Results (last 24 hours)     ** No results found for the last 24 hours. **        Orders (last 24 hrs)     Start     Ordered    04/14/17 0900  aspirin tablet 325 mg  Daily      04/13/17 1736    04/14/17 0900  aspirin suppository 300 mg  Daily      04/13/17 1736    04/14/17 0824  OT  Plan of Care Cert / Re-Cert  Once     Comments:  Occupational Therapy Plan of Care  1 time eval  Certification Period: 2017 - 2017    Patient Name: Kerry Woodruff  : 1955    (R07.9) Chest pain in adult                Assessment  OT Assessment  OT Diagnosis: decreased ADL  Plans/Goals Discussed With: patient and family, agreed upon  Criteria for Skilled Therapeutic Interventions Met: no problems identified which require skilled intervention                  Plan    OT Plan  Therapy Frequency: evaluation only      Manuel Tee, OTR/L  2017        By cosigning this order, either electronically or physically, I certify that the therapy services are furnished while this patient is under my care, the services outline above are required by this patient, and will be reviewed every 30 days.        M.D.:__________________________________________ Date: ______________    17 0823    17 0600  Hemoglobin A1c  Morning Draw      17 1736    17 0600  Lipid Panel  Morning Draw      17 1736    17 0600  TSH  Morning Draw      17 1736    17 0553  POC Glucose Fingerstick  Once      17 0552    17 0019  POC Glucose Fingerstick  Once      17 0018    17 2316  oxyCODONE-acetaminophen (PERCOCET)  MG per tablet 1 tablet  Every 6 Hours PRN      17 2316    17 2100  atorvastatin (LIPITOR) tablet 80 mg  Nightly      17 1736    17 2000  Intake and Output  Every 4 Hours      17 1736    17 1851  POC Glucose Fingerstick  Once      17 1850    17 1825  Inpatient Consult to Internal Medicine  Once     Provider:  Brady Aggarwal MD    17 [id number] 1824  Diet Regular; Consistent Carbohydrate  Diet Effective Now      17 1824    17 1815  sodium chloride 0.9 % infusion  Continuous      17 1736    17 1800  POC Glucose Fingerstick  Every 6 Hours     Comments:  May  Discontinue After 2 Consecutive Readings Less Than 140  Notify Provider if 2 Readings Greater Than 140    04/13/17 1736    04/13/17 1800  enoxaparin (LOVENOX) syringe 30 mg  Every 24 Hours      04/13/17 1736    04/13/17 1737  Remove & Replace Compression Stockings (TEDS) Daily  Daily      04/13/17 1736    04/13/17 1737  CBC (No Diff)  Daily      04/13/17 1736    04/13/17 1737  Comprehensive Metabolic Panel  Daily      04/13/17 1736    04/13/17 1735  US Carotid Bilateral  1 Time Imaging      04/13/17 1736    04/13/17 1734  Mechanical VTE Prophylaxis Not Indicated: Low Risk for VTE  Once      04/13/17 1736    04/13/17 1733  Inpatient Admission  Once      04/13/17 1736    04/13/17 1733  VTE Risk Assessment - Moderate Risk  Once      04/13/17 1736    04/13/17 1732  Full Code  Continuous      04/13/17 1736    04/13/17 1732  Assessed for Rehabilitation Services  Once      04/13/17 1736    04/13/17 1732  Reason for not initiating IV thrombolytic  Once      04/13/17 1736    04/13/17 1732  Vital Signs Per hospital policy  Per Hospital Policy     Comments:  Keep SBP <220, DBP <110    04/13/17 1736    04/13/17 1732  Cardiac Monitoring  Continuous      04/13/17 1736    04/13/17 1732  Activity - Strict Bed Rest  Until Discontinued      04/13/17 1736    04/13/17 1732  Turn Patient  Now Then Every 2 Hours      04/13/17 1736    04/13/17 1732  Neuro Checks  Per Hospital Policy      04/13/17 1736    04/13/17 1732  NIHSS Assessment  Every Shift     Comments:  Turn off all sedation medications prior to performing assessment. Assessment to be performed upon admission, transfer to another unit, discharge, and with neurological decline. If NIHSS change is greater than or equal to 4 and/or neurological decline is noted notify physician.    04/13/17 1736    04/13/17 1732  Place Compression Stockings (TEDs)  Once      04/13/17 1736    04/13/17 1732  Provide Stroke Education Material  Prior to Discharge     Comments:  Educate patient PRN and  daily during hospitalization.    04/13/17 1736    04/13/17 1732  Nursing Dysphagia Screening  Once      04/13/17 1736    04/13/17 1732  RN to Place Order SLP Consult - Eval & Treat Choosing Reason of RN Dysphagia Screen Failed  Continuous     Comments:  RN to Place Order SLP Consult - Eval & Treat Choosing Reason of RN Dysphagia Screen Failed    04/13/17 1736    04/13/17 1732  Nurse to Call MD or Nutrition Services for Diet if Patient Passes Dysphagia Screen  Once      04/13/17 1736    04/13/17 1732  Notify Provider  Until Discontinued      04/13/17 1736    04/13/17 1732  NPO Diet  Diet Effective Now,   Status:  Canceled     Comments:  Strict NPO Until Nursing Dysphagia Screen Complete    04/13/17 1736    04/13/17 1732  OT Consult: Eval & Treat Stroke Patient  Once      04/13/17 1736    04/13/17 1732  PT Consult: Eval & Treat  Once      04/13/17 1736    04/13/17 1732  SLP Consult: Eval & Treat  Once      04/13/17 1736    04/13/17 1732  Consult to Case Management   Once     Provider:  (Not yet assigned)    04/13/17 1736    04/13/17 1732  Adult transthoracic echo complete  Once     Comments:  With bubble study.    04/13/17 1736    04/13/17 1732  Troponin  Once      04/13/17 1736    04/13/17 1732  Insert Peripheral IV  Once      04/13/17 1736    04/13/17 1732  Saline Lock & Maintain IV Access  Continuous      04/13/17 1736    04/13/17 1731  sodium chloride 0.9 % flush 1-10 mL  As Needed      04/13/17 1736    04/13/17 1731  ondansetron (ZOFRAN) injection 4 mg  Every 6 Hours PRN      04/13/17 1736    04/13/17 1731  labetalol (NORMODYNE,TRANDATE) injection 10 mg  Every 10 Minutes PRN      04/13/17 1736    Unscheduled  Order CT Head Without Contrast for Neurological Decline  As Needed      04/13/17 1736    --  folic acid (FOLVITE) 1 MG tablet  Daily      04/13/17 1919    --  Cyanocobalamin (B-12) 1000 MCG/ML kit  Weekly      04/13/17 1933    --  SCANNED - TELEMETRY        04/13/17 0000          Physician  Progress Notes (last 24 hours) (Notes from 4/13/2017 10:08 AM through 4/14/2017 10:08 AM)     No notes of this type exist for this encounter.           Consult Notes (last 24 hours) (Notes from 4/13/2017 10:08 AM through 4/14/2017 10:08 AM)      Clarice Shin MD at 4/13/2017  8:39 PM  Version 1 of 1     Consult Orders:    1. Inpatient Consult to Internal Medicine [[id number]] ordered by Jack Mcgregor MD at 04/13/17 1824                 Dictation on: 04/13/2017  8:57 PM by: CLARICE SHIN [[id number]]          Electronically signed by Clarice Shin MD at 4/13/2017  8:58 PM

## 2017-04-14 NOTE — CONSULTS
Encounter time: 8:40 p.m. There is also information from the patient and interinstitutional transfer form.     MEDICAL CONSULTATION    CONSULTING PHYSICIAN: Brady Aggarwal MD    REASON FOR CONSULTATION: Renal dysfunction, leukocytosis in a patient with history of chronic kidney disease and diabetes mellitus. Transferring institution is [facility].    REASON FOR TRANSFER: Transient ischemic attack, to rule out CVA for neurologist input.    CHIEF COMPLAINT: Brought in for evaluation of slurred speech, dizziness, lightheadedness, and right upper extremity numbness and weakness associated with headache.     HISTORY OF PRESENT ILLNESS: The patient is a 61-year-old  female patient with past medical history significant for COPD, diabetes mellitus, hypothyroidism, hiatal hernia, hypertension, nephrolithiasis, chronic kidney disease, stage unknown, osteoarthritis, asthma, chronic diastolic who came in as a transfer from [facility] for transient ischemic attack, rule out stroke. According to the patient, on the day of presentation she had a sudden onset of right upper extremity numbness and weakness associated with slurred speech. Patient complained of associated headache; localizes the headache to the frontal aspect of biparietal region. Intensity very mild, about 4-5 out of 10; it was constant, sharp, nonradiating. Denies any alleviating or aggravating factors. Because of the above symptomatology, the decision was taken to obtain a CT from transferring institution which shows multiple hazy CT images of the head obtained without intravenous administration. Comparison is made to prior study and there is mild cerebral volume loss. There is minimal hypodensity in the periventricular white matter. There is no CT evidence of acute infarction. There is no mass, mass effect, midline shift, or hydrocephalus. There is no intracranial hemorrhage. According to the patient,  she has a history of chronic kidney disease, used to follow up with a nephrologist in the past. From transferring institution, the patient had elevated WBC level as well as increasing creatinine level on account of which Hospital Medicine was consulted for general medical management as well as leukocytosis.    REVIEW OF SYSTEMS:   CONSTITUTIONAL: There is no fever, no chills, no significant weight loss.  EYES: Negative.  EARS, NOSE, THROAT: Negative.  RESPIRATORY: Negative.   CARDIOVASCULAR: Negative.  GASTROINTESTINAL: Negative.  GENITOURINARY: Negative.  INTEGUMENT: Negative.  MUSCULOSKELETAL: Chronic lower back pain.   ENDOCRINE: Negative.  NEUROLOGIC: Negative, except as indicated in History of Present Illness.   PSYCHIATRY: Negative.  ALLERGIC/IMMUNOLOGIC: Negative.     PAST MEDICAL HISTORY:   1.  Chronic kidney disease, stage unknown.  2.  Hypertension.  3.  Hiatal hernia.  4.  Hypothyroidism.  5.  Diabetes mellitus.  6.  COPD.  7.  Chronic diastolic congestive heart failure.  8.  Asthma.  9.  Osteoarthritis.    PAST SURGICAL HISTORY:  1.  Adenoidectomy.  2.  Cardiac catheterization.  3.   section.  4.  Cholecystectomy.  5.  Coronary angioplasty.  6.  Gastric resection.  7.  Hysterectomy.  8.  Splenectomy.    ALLERGIES:   1.  HYDROCODONE.  2.  IBUPROFEN.    HOME MEDICATIONS:   1.  Singulair 10 mg p.o. daily.  2.  Lisinopril 20 mg p.o. daily.  3.  Duloxetine extended release 30 mg p.o. daily.  4.  Ropinirole 2 mg daily.  5.  Crestor 10 mg daily.  6.  Potassium chloride 10 mEq b.i.d.   7.  Lyrica 100 mg p.o. t.i.d.  8.  Aspirin 81 mg p.o. daily.  9.  Metoprolol 100 mg p.o. daily.   10.  Plavix 75 mg p.o. daily.    11.  Folic acid 1 mg p.o. daily.  12.  Vitamin D 1000 units daily.  13.  Januvia 100 mg p.o. daily.  14.  Nexium 40 mg p.o. daily.    SOCIAL HISTORY: The patient smokes about a pack of cigarettes a day. No alcohol use. No illicit drug use.     FAMILY HISTORY: Significant for CVA in mother and  sibling.    PHYSICAL EXAMINATION:  VITAL SIGNS: Temperature 98.0, respiratory rate 18, heart rate 60, blood pressure 96/71, saturation 98% on room air.  GENERAL APPEARANCE: This is a middle-aged lady who is in no apparent respiratory distress.  HEAD/NECK: Normocephalic, atraumatic. Pupils reactive to light and accommodation. Extraocular muscles intact. Mucous membranes pink and moist. Neck is supple. There is no JVD, no carotid bruit. No lymphadenopathy. No thyroid masses were palpable in the neck.  CHEST: Patient has good respiratory effort. No accessory muscle use. Air entry is adequate bilaterally. There is no wheezing, no rales.   HEART: First and 2nd heart sounds heard. There is no murmur, no gallop. Regular rate and rhythm.   ABDOMEN: Soft, nontender, nondistended. No organomegaly. Bowel sounds auscultated in all 4 quadrants. Normoactive.   NEUROLOGIC: The patient is awake, communicating. There is no focal neurologic deficit.  EXTREMITIES: There is no edema, no cyanosis. Peripheral pulses palpable bilaterally.     DIAGNOSTIC DATA: Radiographic studies done from transferring institution: Patient had CT scan of the head done. Comparison was made to prior study. There is mild cerebral volume loss. There is minimal hypodensity in the periventricular white matter. There is no CT evidence of acute infarction. There is no mass effect, mass, midline shift, hydrocephalus. There is no intracranial hemorrhage. Chronic minimal ischemic deep white matter changes but no acute intracranial abnormality. The patient had a followup MRI done also from transferring institution, which shows patchy areas of acute infarction within the periventricular white matter of left posterior frontal and parietal lobes and another within the left superior parietal lobe, mild atrophy, scattered chronic white matter changes. Laboratory investigations done from transferring institution: WBC 16.6, hemoglobin 14.3, hematocrit 43.0, platelets  383,000. Bedside glucose 127, sodium 137, potassium 4.8, chloride 104, bicarbonate 25, BUN 16, creatinine 1.72, calcium 8.5, glucose 132, GFR is 32. PT is 13.8, PTT 26.0, INR 1.06. Troponin is 0.025.     PROVISIONAL DIAGNOSES:  1.  Acute CVA.  2.  Leukocytosis, etiology unknown.  3.  Chronic kidney disease, stage unknown.   4.  Acute renal failure superimposed on chronic kidney disease, stage unknown.   5.  Hypertension.  6.  Diabetes mellitus.  7.  Hiatal hernia.  8.  Hypothyroidism.    PLAN: Admit to Neurology floor. Maintain patient on neurology watch q.4 h. Patient had initial CT scan done from transferring institution which revealed no acute intracranial pathology, but a followup MRI done reveals patchy area of acute infarction within the periventricular white matter. The patient is pending for neurology evaluation. The patient has mild leukocytosis; etiology at this time is unknown. Will follow the WBC. The patient has no fever. Will check urinalysis and get chest x-ray. If urinalysis and chest x-ray come back will continue to monitor patient closely without any antibiotic use at this time. Apparently the patient has a history of chronic kidney disease, used to followup with a nephrologist so probably at baseline. The patient has a history of diabetes mellitus, maintained on diabetic diet, Accu-Chek before each meal and at bedtime. Will check hemoglobin A1c level. Check lipid panel.     Thank you, Dr. Mcgregor, for getting Hospital Medicine services involved in the management of this patient. Will continue to follow up with you closely in case there is any need for change in plan or therapy. Thank you, once again, and the consultation is highly appreciated.

## 2017-04-14 NOTE — PLAN OF CARE
Problem: Stroke (Ischemic) (Adult)  Goal: Signs and Symptoms of Listed Potential Problems Will be Absent or Manageable (Stroke)  Outcome: Ongoing (interventions implemented as appropriate)    Problem: Fall Risk (Adult)  Goal: Absence of Falls  Outcome: Ongoing (interventions implemented as appropriate)    Problem: Patient Care Overview (Adult)  Goal: Plan of Care Review  Outcome: Ongoing (interventions implemented as appropriate)    04/14/17 3256   Coping/Psychosocial Response Interventions   Plan Of Care Reviewed With patient   Patient Care Overview   Progress improving   Outcome Evaluation   Outcome Summary/Follow up Plan pt has returned back to baseline. pt has c/o of chronic back pain and has been medicated. stroke work up has been ordered       Goal: Adult Individualization and Mutuality  Outcome: Ongoing (interventions implemented as appropriate)  Goal: Discharge Needs Assessment  Outcome: Ongoing (interventions implemented as appropriate)

## 2017-04-14 NOTE — PLAN OF CARE
Problem: Stroke (Ischemic) (Adult)  Goal: Signs and Symptoms of Listed Potential Problems Will be Absent or Manageable (Stroke)  Outcome: Ongoing (interventions implemented as appropriate)    Problem: Fall Risk (Adult)  Goal: Absence of Falls  Outcome: Ongoing (interventions implemented as appropriate)    Problem: Patient Care Overview (Adult)  Goal: Plan of Care Review  Outcome: Ongoing (interventions implemented as appropriate)    04/14/17 0527   Coping/Psychosocial Response Interventions   Plan Of Care Reviewed With patient   Patient Care Overview   Progress no change   Outcome Evaluation   Outcome Summary/Follow up Plan vss; nih = 1; c/o back pain; prn pain med offers relief; pt rested well this shift; up x 1 assist to bsc       Goal: Adult Individualization and Mutuality  Outcome: Ongoing (interventions implemented as appropriate)  Goal: Discharge Needs Assessment  Outcome: Ongoing (interventions implemented as appropriate)

## 2017-04-15 VITALS
HEART RATE: 70 BPM | RESPIRATION RATE: 18 BRPM | TEMPERATURE: 97.5 F | HEIGHT: 65 IN | SYSTOLIC BLOOD PRESSURE: 147 MMHG | OXYGEN SATURATION: 90 % | BODY MASS INDEX: 44.82 KG/M2 | DIASTOLIC BLOOD PRESSURE: 64 MMHG | WEIGHT: 269 LBS

## 2017-04-15 PROBLEM — E78.2 ELEVATED TRIGLYCERIDES WITH HIGH CHOLESTEROL: Status: ACTIVE | Noted: 2017-04-15

## 2017-04-15 PROBLEM — E66.01 MORBID OBESITY DUE TO EXCESS CALORIES (HCC): Status: ACTIVE | Noted: 2017-04-15

## 2017-04-15 PROBLEM — Z72.0 TOBACCO ABUSE: Status: ACTIVE | Noted: 2017-04-15

## 2017-04-15 LAB
ALBUMIN SERPL-MCNC: 3 G/DL (ref 3.5–5)
ALBUMIN/GLOB SERPL: 0.8 G/DL (ref 1.1–2.5)
ALP SERPL-CCNC: 119 U/L (ref 24–120)
ALT SERPL W P-5'-P-CCNC: <15 U/L (ref 0–54)
ANION GAP SERPL CALCULATED.3IONS-SCNC: 7 MMOL/L (ref 4–13)
AST SERPL-CCNC: 19 U/L (ref 7–45)
BILIRUB SERPL-MCNC: 0.6 MG/DL (ref 0.1–1)
BILIRUB UR QL STRIP: NEGATIVE
BUN BLD-MCNC: 18 MG/DL (ref 5–21)
BUN/CREAT SERPL: 13.6 (ref 7–25)
CALCIUM SPEC-SCNC: 8.5 MG/DL (ref 8.4–10.4)
CHLORIDE SERPL-SCNC: 105 MMOL/L (ref 98–110)
CLARITY UR: CLEAR
CO2 SERPL-SCNC: 25 MMOL/L (ref 24–31)
COLOR UR: YELLOW
CREAT BLD-MCNC: 1.32 MG/DL (ref 0.5–1.4)
DEPRECATED RDW RBC AUTO: 52 FL (ref 40–54)
ERYTHROCYTE [DISTWIDTH] IN BLOOD BY AUTOMATED COUNT: 15.1 % (ref 12–15)
GFR SERPL CREATININE-BSD FRML MDRD: 41 ML/MIN/1.73
GLOBULIN UR ELPH-MCNC: 3.8 GM/DL
GLUCOSE BLD-MCNC: 136 MG/DL (ref 70–100)
GLUCOSE BLDC GLUCOMTR-MCNC: 144 MG/DL (ref 70–130)
GLUCOSE UR STRIP-MCNC: NEGATIVE MG/DL
HCT VFR BLD AUTO: 38.1 % (ref 37–47)
HGB BLD-MCNC: 12.7 G/DL (ref 12–16)
HGB UR QL STRIP.AUTO: NEGATIVE
KETONES UR QL STRIP: NEGATIVE
LEUKOCYTE ESTERASE UR QL STRIP.AUTO: NEGATIVE
MCH RBC QN AUTO: 31.3 PG (ref 28–32)
MCHC RBC AUTO-ENTMCNC: 33.3 G/DL (ref 33–36)
MCV RBC AUTO: 93.8 FL (ref 82–98)
NITRITE UR QL STRIP: NEGATIVE
PH UR STRIP.AUTO: <=5 [PH] (ref 5–8)
PLATELET # BLD AUTO: 338 10*3/MM3 (ref 130–400)
PMV BLD AUTO: 10 FL (ref 6–12)
POTASSIUM BLD-SCNC: 4.3 MMOL/L (ref 3.5–5.3)
PROT SERPL-MCNC: 6.8 G/DL (ref 6.3–8.7)
PROT UR QL STRIP: NEGATIVE
RBC # BLD AUTO: 4.06 10*6/MM3 (ref 4.2–5.4)
SODIUM BLD-SCNC: 137 MMOL/L (ref 135–145)
SP GR UR STRIP: 1.01 (ref 1–1.03)
UROBILINOGEN UR QL STRIP: NORMAL
WBC NRBC COR # BLD: 14.83 10*3/MM3 (ref 4.8–10.8)

## 2017-04-15 PROCEDURE — 80053 COMPREHEN METABOLIC PANEL: CPT | Performed by: PSYCHIATRY & NEUROLOGY

## 2017-04-15 PROCEDURE — 85027 COMPLETE CBC AUTOMATED: CPT | Performed by: PSYCHIATRY & NEUROLOGY

## 2017-04-15 PROCEDURE — 99239 HOSP IP/OBS DSCHRG MGMT >30: CPT | Performed by: PSYCHIATRY & NEUROLOGY

## 2017-04-15 PROCEDURE — 81003 URINALYSIS AUTO W/O SCOPE: CPT | Performed by: PSYCHIATRY & NEUROLOGY

## 2017-04-15 PROCEDURE — 82962 GLUCOSE BLOOD TEST: CPT

## 2017-04-15 RX ORDER — FENOFIBRATE 145 MG/1
145 TABLET, COATED ORAL DAILY
Qty: 30 TABLET | Refills: 1 | Status: SHIPPED | OUTPATIENT
Start: 2017-04-15 | End: 2018-04-15

## 2017-04-15 RX ORDER — ATORVASTATIN CALCIUM 80 MG/1
80 TABLET, FILM COATED ORAL NIGHTLY
Qty: 30 TABLET | Refills: 1 | Status: SHIPPED | OUTPATIENT
Start: 2017-04-15

## 2017-04-15 RX ADMIN — POTASSIUM CHLORIDE 20 MEQ: 1.5 POWDER, FOR SOLUTION ORAL at 08:20

## 2017-04-15 RX ADMIN — OXYCODONE HYDROCHLORIDE AND ACETAMINOPHEN 1 TABLET: 10; 325 TABLET ORAL at 08:28

## 2017-04-15 RX ADMIN — CLOPIDOGREL BISULFATE 75 MG: 75 TABLET, FILM COATED ORAL at 08:20

## 2017-04-15 RX ADMIN — PANTOPRAZOLE SODIUM 40 MG: 40 TABLET, DELAYED RELEASE ORAL at 06:24

## 2017-04-15 RX ADMIN — SITAGLIPTIN 100 MG: 100 TABLET, FILM COATED ORAL at 08:20

## 2017-04-15 RX ADMIN — LISINOPRIL 20 MG: 20 TABLET ORAL at 08:20

## 2017-04-15 RX ADMIN — FOLIC ACID 1 MG: 1 TABLET ORAL at 08:20

## 2017-04-15 RX ADMIN — Medication 81 MG: at 08:20

## 2017-04-15 RX ADMIN — OXYCODONE HYDROCHLORIDE AND ACETAMINOPHEN 1 TABLET: 10; 325 TABLET ORAL at 04:17

## 2017-04-15 RX ADMIN — PREGABALIN 100 MG: 100 CAPSULE ORAL at 08:28

## 2017-04-15 RX ADMIN — METOPROLOL SUCCINATE 100 MG: 100 TABLET, FILM COATED, EXTENDED RELEASE ORAL at 08:19

## 2017-04-15 RX ADMIN — DULOXETINE HYDROCHLORIDE 30 MG: 30 CAPSULE, DELAYED RELEASE ORAL at 08:20

## 2017-04-15 NOTE — PLAN OF CARE
Problem: Stroke (Ischemic) (Adult)  Goal: Signs and Symptoms of Listed Potential Problems Will be Absent or Manageable (Stroke)  Outcome: Ongoing (interventions implemented as appropriate)    Problem: Fall Risk (Adult)  Goal: Absence of Falls  Outcome: Ongoing (interventions implemented as appropriate)    Problem: Patient Care Overview (Adult)  Goal: Plan of Care Review  Outcome: Ongoing (interventions implemented as appropriate)    04/15/17 0459   Coping/Psychosocial Response Interventions   Plan Of Care Reviewed With patient   Patient Care Overview   Progress improving   Outcome Evaluation   Outcome Summary/Follow up Plan vss; nihss=0; alert and oriented x 4; c/o back pain; prn pain med offers relief; standby assist to bathroom; family at bedside       Goal: Adult Individualization and Mutuality  Outcome: Ongoing (interventions implemented as appropriate)  Goal: Discharge Needs Assessment  Outcome: Ongoing (interventions implemented as appropriate)    Problem: Pain, Acute (Adult)  Goal: Identify Related Risk Factors and Signs and Symptoms  Outcome: Ongoing (interventions implemented as appropriate)  Goal: Acceptable Pain Control/Comfort Level  Outcome: Ongoing (interventions implemented as appropriate)

## 2017-04-15 NOTE — DISCHARGE SUMMARY
Date of Admission: 4/13/2017  Date of Discharge:  4/15/2017    Admitting Diagnosis: Right facial drooping  Discharge Diagnosis:   1.  Left parietal small thrombotic stroke  2.  Uncontrolled Diabetes Mellitus  3.  Hyperlipidemia  4.  Elevated triglycerides  5.  Hypertension  6.  Morbid obesity  7.  Tobacco abuse    Procedures Performed  MR Brain performed at outside hospital and reviewed by me  Carotid ultrasound  TTE  Therapy consults       Consults:   Consults     Date and Time Order Name Status Description    4/13/2017 1824 Inpatient Consult to Internal Medicine Completed             Pertinent Test Results:     Lab Results (last 72 hours)     Procedure Component Value Units Date/Time    POC Glucose Fingerstick [[id number]]  (Normal) Collected:  04/13/17 1838    Specimen:  Blood Updated:  04/13/17 1850     Glucose 124 mg/dL       : [id number] KelseyMeter ID: [id number]       CBC (No Diff) [[id number]]  (Abnormal) Collected:  04/13/17 1830    Specimen:  Blood Updated:  04/13/17 1852     WBC 16.67 (H) 10*3/mm3      RBC 4.18 (L) 10*6/mm3      Hemoglobin 13.2 g/dL      Hematocrit 39.5 %      MCV 94.5 fL      MCH 31.6 pg      MCHC 33.4 g/dL      RDW 15.5 (H) %      RDW-SD 53.4 fl      MPV 9.9 fL      Platelets 349 10*3/mm3     Comprehensive Metabolic Panel [[id number]]  (Abnormal) Collected:  04/13/17 1830    Specimen:  Blood Updated:  04/13/17 1905     Glucose 124 (H) mg/dL      BUN 18 mg/dL      Creatinine 1.54 (H) mg/dL      Sodium 140 mmol/L      Potassium 4.6 mmol/L      Chloride 105 mmol/L      CO2 24.0 mmol/L      Calcium 8.5 mg/dL      Total Protein 6.8 g/dL      Albumin 3.10 (L) g/dL      ALT (SGPT) <15 U/L      AST (SGOT) 28 U/L      Alkaline Phosphatase 115 U/L      Total Bilirubin 0.7 mg/dL      eGFR Non African Amer 34 (L) mL/min/1.73      Globulin 3.7 gm/dL      A/G Ratio 0.8 (L) g/dL      BUN/Creatinine Ratio 11.7     Anion Gap 11.0 mmol/L     Troponin [[id number]]  (Normal) Collected:  04/13/17  1830    Specimen:  Blood Updated:  04/13/17 1916     Troponin I 0.013 ng/mL     POC Glucose Fingerstick [[id number]]  (Abnormal) Collected:  04/14/17 0007    Specimen:  Blood Updated:  04/14/17 0018     Glucose 140 (H) mg/dL       : [id number] Terry ThucMeter ID: [id number]       CBC (No Diff) [[id number]]  (Abnormal) Collected:  04/14/17 0506    Specimen:  Blood Updated:  04/14/17 0529     WBC 14.55 (H) 10*3/mm3      RBC 4.07 (L) 10*6/mm3      Hemoglobin 12.6 g/dL      Hematocrit 38.7 %      MCV 95.1 fL      MCH 31.0 pg      MCHC 32.6 (L) g/dL      RDW 15.7 (H) %      RDW-SD 54.4 (H) fl      MPV 9.8 fL      Platelets 331 10*3/mm3     Comprehensive Metabolic Panel [[id number]]  (Abnormal) Collected:  04/14/17 0506    Specimen:  Blood Updated:  04/14/17 0534     Glucose 137 (H) mg/dL      BUN 18 mg/dL      Creatinine 1.43 (H) mg/dL      Sodium 138 mmol/L      Potassium 4.2 mmol/L      Chloride 107 mmol/L      CO2 25.0 mmol/L      Calcium 8.2 (L) mg/dL      Total Protein 6.5 g/dL      Albumin 3.00 (L) g/dL      ALT (SGPT) 17 U/L      AST (SGOT) 23 U/L      Alkaline Phosphatase 113 U/L      Total Bilirubin 0.5 mg/dL      eGFR Non African Amer 38 (L) mL/min/1.73      Globulin 3.5 gm/dL      A/G Ratio 0.9 (L) g/dL      BUN/Creatinine Ratio 12.6     Anion Gap 6.0 mmol/L     Lipid Panel [[id number]]  (Abnormal) Collected:  04/14/17 0506    Specimen:  Blood Updated:  04/14/17 0545     Total Cholesterol 183 mg/dL      Triglycerides 500 (H) mg/dL      HDL Cholesterol 25 (L) mg/dL      LDL Cholesterol  110 (H) mg/dL      LDL/HDL Ratio --      Unable to calculate       POC Glucose Fingerstick [[id number]]  (Abnormal) Collected:  04/14/17 0541    Specimen:  Blood Updated:  04/14/17 0552     Glucose 140 (H) mg/dL       : [id number] Terry ThucMeter ID: [id number]       TSH [[id number]]  (Normal) Collected:  04/14/17 0506    Specimen:  Blood Updated:  04/14/17 0614     TSH 0.912 mIU/mL     Hemoglobin A1c [[id number]] Collected:  04/14/17  0506    Specimen:  Blood Updated:  04/14/17 0652     Hemoglobin A1C 8.6 %     Narrative:       Less than 6.0           Non-Diabetic Range  6.0-7.0                 ADA Therapeutic Target  Greater than 7.0        Action Suggested    POC Glucose Fingerstick [[id number]]  (Abnormal) Collected:  04/14/17 1153    Specimen:  Blood Updated:  04/14/17 1204     Glucose 178 (H) mg/dL       : [id number] Sullivan ShelbyMeter ID: [id number]       P2Y12 Platelet Inhibition [[id number]] Collected:  04/14/17 1158    Specimen:  Blood Updated:  04/14/17 1251     Hematocrit 41.6 %      Platelets 373 10*3/mm3      P2Y12 Reactivity Unit 218 PRU     Narrative:       PRU reference range 194-418 is for patients not receiving P2Y12 drug. Post Drug results: Lower PRU levels are associated with expected antiplatelet effect. Values may be below the stated reference range above. The post-drug PRU values reported are .        POC Glucose Fingerstick [[id number]]  (Abnormal) Collected:  04/14/17 1655    Specimen:  Blood Updated:  04/14/17 1707     Glucose 164 (H) mg/dL       : [id number] Sullivan ShelbyMeter ID: [id number]       POC Glucose Fingerstick [[id number]]  (Abnormal) Collected:  04/14/17 2116    Specimen:  Blood Updated:  04/14/17 2127     Glucose 146 (H) mg/dL       : [id number] StewartAdformucMeter ID: [id number]       Urinalysis With / Culture If Indicated [[id number]]  (Normal) Collected:  04/15/17 0419    Specimen:  Urine from Urine, Clean Catch Updated:  04/15/17 0443     Color, UA Yellow     Appearance, UA Clear     pH, UA <=5.0     Specific Gravity, UA 1.014     Glucose, UA Negative     Ketones, UA Negative     Bilirubin, UA Negative     Blood, UA Negative     Protein, UA Negative     Leuk Esterase, UA Negative     Nitrite, UA Negative     Urobilinogen, UA 0.2 E.U./dL    Narrative:       Urine microscopic not indicated.    CBC (No Diff) [[id number]]  (Abnormal) Collected:  04/15/17 0542    Specimen:  Blood Updated:  04/15/17 0554      WBC 14.83 (H) 10*3/mm3      RBC 4.06 (L) 10*6/mm3      Hemoglobin 12.7 g/dL      Hematocrit 38.1 %      MCV 93.8 fL      MCH 31.3 pg      MCHC 33.3 g/dL      RDW 15.1 (H) %      RDW-SD 52.0 fl      MPV 10.0 fL      Platelets 338 10*3/mm3     Comprehensive Metabolic Panel [[id number]]  (Abnormal) Collected:  04/15/17 0542    Specimen:  Blood Updated:  04/15/17 0602     Glucose 136 (H) mg/dL      BUN 18 mg/dL      Creatinine 1.32 mg/dL      Sodium 137 mmol/L      Potassium 4.3 mmol/L      Chloride 105 mmol/L      CO2 25.0 mmol/L      Calcium 8.5 mg/dL      Total Protein 6.8 g/dL      Albumin 3.00 (L) g/dL      ALT (SGPT) <15 U/L      AST (SGOT) 19 U/L      Alkaline Phosphatase 119 U/L      Total Bilirubin 0.6 mg/dL      eGFR Non African Amer 41 (L) mL/min/1.73      Globulin 3.8 gm/dL      A/G Ratio 0.8 (L) g/dL      BUN/Creatinine Ratio 13.6     Anion Gap 7.0 mmol/L         Imaging Results (last 72 hours)     Procedure Component Value Units Date/Time    US Carotid Bilateral [[id number]] Collected:  04/14/17 1514     Updated:  04/14/17 1518    Narrative:       History: Stroke       Impression:       Impression:  1. There is less than 50% stenosis of the right internal carotid artery.  2. There is less than 50% stenosis of the left internal carotid artery.  3. Antegrade flow is demonstrated in bilateral vertebral arteries.     Comments: Bilateral carotid vertebral arterial duplex scan was  performed.     Grayscale imaging shows intimal thickening and calcified elements at the  carotid bifurcation. The right internal carotid artery peak systolic  velocity is 90.9 cm/sec. The end-diastolic velocity is 25.2 cm/sec. The  right ICA/CCA ratio is approximately 1.09 . These findings correlate  with less than 50% stenosis of the right internal carotid artery.     Grayscale imaging shows intimal thickening and calcified elements at the  carotid bifurcation. The left internal carotid artery peak systolic  velocity is 102 cm/sec. The  end-diastolic velocity is 41 cm/sec. The  left ICA/CCA ratio is approximately 1.0 . These findings correlate with  less than 50% stenosis of the left internal carotid artery.     Antegrade flow is demonstrated in bilateral vertebral arteries.       This report was finalized on 04/14/2017 15:15 by Dr. Ashutosh Almanza MD.        · Left ventricular wall thickness is consistent with mild-to-moderate concentric hypertrophy.  · Left ventricular function is normal. Estimated EF appears to be in the range of 61 - 65%.  · Patent foramen ovale present.  · Estimated right ventricular systolic pressure from tricuspid regurgitation is normal (<35 mmHg).      Hospital Course  Patient is a 61 y.o. female presented with a history significant for 2 myocardial infarction status post stenting, diabetes mellitus, hypertension, chronic instructed pulmonary disease, congestive heart failure who presented to an outside hospital with right arm and leg weakness along with right lower facial drooping.  She initially presented to [facility].  CT of head without contrast was unremarkable.  An MRI showed a left parietal-occipital acute ischemic stroke.  She was transferred here for further neurologic care.  Transthoracic echocardiogram showed no signs of valvular abnormality's.  She had a preserved ejection fraction.  PFO was present.  Carotid ultrasonography showed no signs of carotid stenoses.  Speech occupational physical therapy evaluated the patient and found no therapy needs.  She was found to have elevated cholesterol panels including extremely elevated triglycerides and elevated LDL buccal.  In addition to this her hemoglobin A1c was found to be 8.6.  She also showed signs of elevated renal function and a leukocytosis.  Internal medicine consult was obtained and suggested increase glycemic control to be performed as an outpatient fiber primary care physician.  A urinalysis showed no evidence of urinary tract  infection to explain the leukocytosis.    On the day of discharge her neurologic exam was unremarkable.  NIH stroke scale was 0.  Arms and legs were equal and antigravity bilaterally.  She had no bulbar abnormalities on examination.  She did have a Plavix inhibition test which showed efficacy of her Plavix.  She will continue underwent platelet therapy of Plavix and aspirin.  This was initially started by cardiology secondary to coronary artery disease status post stenting.  We will continue with this currently.  I did stop her home statin has start high-dose Lipitor 80 mg by mouth daily at bedtime.  In addition to that I am adding TriCor secondary to her hypertriglyceridemia.  I counseled her extensively about the importance of tobacco cessation.  In addition to this I counseled her about diet next her size and the importance of compliance with antihypertensives along with increase glycemic control.  She currently is on maximal mental management including 2 antiplatelets and a statin agent and yet continues to show signs of arterial disease such as her to coronary artery disease events along with this new stroke.  Without drastic lifestyle modification it is my suggestion that she is high risk for continued arterial disease.      Discharge Disposition  Home or Self Care    Discharge Medications   Kerry Woodruff   Home Medication Instructions KIRK:[id number]    Printed on:04/15/17 1136   Medication Information                      albuterol (PROVENTIL HFA;VENTOLIN HFA) 108 (90 BASE) MCG/ACT inhaler  Inhale 2 puffs Every 4 (Four) Hours As Needed for wheezing.             aspirin 81 MG chewable tablet  Chew 1 tablet Daily.             atorvastatin (LIPITOR) 80 MG tablet  Take 1 tablet by mouth Every Night.             clopidogrel (PLAVIX) 75 MG tablet  Take 1 tablet by mouth Daily.             Cyanocobalamin (B-12) 1000 MCG/ML kit  Inject 1,000 mcg as directed 1 (One) Time Per Week. On mondays.             diazePAM  (VALIUM) 5 MG tablet  Take 5 mg by mouth 2 (Two) Times a Day As Needed for anxiety.             DULoxetine (CYMBALTA) 30 MG capsule  Take 30 mg by mouth Daily.             esomeprazole (nexIUM) 40 MG capsule  Take 40 mg by mouth 2 (Two) Times a Day.             Exenatide ER (BYDUREON) 2 MG pen-injector  Inject 2 mg under the skin 1 (One) Time Per Week. Tuesday             fenofibrate (TRICOR) 145 MG tablet  Take 1 tablet by mouth Daily.             folic acid (FOLVITE) 1 MG tablet  Take 1 mg by mouth Daily.             furosemide (LASIX) 20 MG tablet  Take 20 mg by mouth Daily As Needed.             lisinopril (PRINIVIL,ZESTRIL) 20 MG tablet  Take 20 mg by mouth Daily.             metoprolol succinate XL (TOPROL-XL) 100 MG 24 hr tablet  Take 100 mg by mouth Daily.             oxyCODONE-acetaminophen (PERCOCET)  MG per tablet  Take 1 tablet by mouth Every 6 (Six) Hours As Needed for moderate pain (4-6).             potassium chloride (K-DUR) 10 MEQ CR tablet  Take 20 mEq by mouth 2 (Two) Times a Day.             pregabalin (LYRICA) 100 MG capsule  Take 100 mg by mouth 3 (Three) Times a Day.             promethazine (PHENERGAN) 25 MG tablet  Take 25 mg by mouth Every 6 (Six) Hours As Needed for nausea or vomiting.             rOPINIRole (REQUIP) 2 MG tablet  Take 2 mg by mouth Every Night.             SITagliptin (JANUVIA) 100 MG tablet  Take 100 mg by mouth Daily.             Vitamin D, Cholecalciferol, (CHOLECALCIFEROL) 400 UNITS tablet  Take 400 Units by mouth Daily.                 Discharge Diet:   Low carb  Activity at Discharge:   Slowly resume normal activity  Follow-up Appointments  Dr. Lau her PCP in 1 week  Neurology outpatient in 3 weeks.    Test Results Pending at Discharge  none     Jack Mcgregor MD  04/15/17  11:35 AM    Time: Discharge 40 min

## 2017-04-15 NOTE — PLAN OF CARE
Problem: Stroke (Ischemic) (Adult)  Goal: Signs and Symptoms of Listed Potential Problems Will be Absent or Manageable (Stroke)  Outcome: Outcome(s) achieved Date Met:  04/15/17    Problem: Fall Risk (Adult)  Goal: Absence of Falls  Outcome: Outcome(s) achieved Date Met:  04/15/17    Problem: Patient Care Overview (Adult)  Goal: Plan of Care Review  Outcome: Outcome(s) achieved Date Met:  04/15/17    04/15/17 1135   Coping/Psychosocial Response Interventions   Plan Of Care Reviewed With patient   Patient Care Overview   Progress improving   Outcome Evaluation   Outcome Summary/Follow up Plan pt is being discharged home. pt has returned to neurological baseline       Goal: Adult Individualization and Mutuality  Outcome: Outcome(s) achieved Date Met:  04/15/17  Goal: Discharge Needs Assessment  Outcome: Outcome(s) achieved Date Met:  04/15/17    Problem: Pain, Acute (Adult)  Goal: Identify Related Risk Factors and Signs and Symptoms  Outcome: Outcome(s) achieved Date Met:  04/15/17  Goal: Acceptable Pain Control/Comfort Level  Outcome: Outcome(s) achieved Date Met:  04/15/17

## 2017-04-17 NOTE — PAYOR COMM NOTE
"[facility] 4-15-17  [id number]      Kerry Fuller (61 y.o. Female)     Date of Birth Social Security Number Address Home Phone MRN    1955  [address] [place] [address] [phone] [id number]    Catholic Marital Status          None Single       Admission Date Admission Type Admitting Provider Attending Provider Department, Room/Bed    4/13/17 Urgent Jack Mcgregor MD  [facility] 3A, 343/1    Discharge Date Discharge Disposition Discharge Destination        4/15/2017 Home or Self Care             Attending Provider: (none)    Allergies:  Hydrocodone, Ibuprofen    Isolation:  None   Infection:  None   Code Status:  Prior    Ht:  65\" (165.1 cm)   Wt:  269 lb (122 kg)    Admission Cmt:  None   Principal Problem:  None                Active Insurance as of 4/13/2017     Primary Coverage     Payor Plan Insurance Group Employer/Plan Group    MEDICARE MEDICARE A & B      Payor Plan Address Payor Plan Phone Number Effective From Effective To    [address] [phone] 6/1/1997     [place] [address]       Subscriber Name Subscriber Birth Date Member ID       KERRY FULLER 1955 [id number]           Secondary Coverage     Payor Plan Insurance Group Employer/Plan Group    [employer] [place]ICA[place]      Payor Plan Address Payor Plan Phone Number Effective From Effective To    [address] [address] [phone] 11/1/2016     [place] [address]       Subscriber Name Subscriber Birth Date Member ID       KERRY FULLER 1955 [id number]                 Emergency Contacts      (Rel.) Home Phone Work Phone Mobile Phone    Tayler Espinal (Other) [phone] -- --    Jason Crain (Other) [phone] -- --               Discharge Summary      Jack Mcgregor MD at 4/15/2017 11:26 AM          Date of Admission: 4/13/2017  Date of Discharge:  4/15/2017    Admitting Diagnosis: Right facial drooping  Discharge Diagnosis:   1.  Left parietal small thrombotic stroke  2.  " Uncontrolled Diabetes Mellitus  3.  Hyperlipidemia  4.  Elevated triglycerides  5.  Hypertension  6.  Morbid obesity  7.  Tobacco abuse    Procedures Performed  MR Brain performed at outside hospital and reviewed by me  Carotid ultrasound  TTE  Therapy consults       Consults:   Consults     Date and Time Order Name Status Description    4/13/2017 1824 Inpatient Consult to Internal Medicine Completed             Pertinent Test Results:     Lab Results (last 72 hours)     Procedure Component Value Units Date/Time    POC Glucose Fingerstick [[id number]]  (Normal) Collected:  04/13/17 1838    Specimen:  Blood Updated:  04/13/17 1850     Glucose 124 mg/dL       : [id number] Maynard Hilo AlhajiseyMeter ID: [id number]       CBC (No Diff) [[id number]]  (Abnormal) Collected:  04/13/17 1830    Specimen:  Blood Updated:  04/13/17 1852     WBC 16.67 (H) 10*3/mm3      RBC 4.18 (L) 10*6/mm3      Hemoglobin 13.2 g/dL      Hematocrit 39.5 %      MCV 94.5 fL      MCH 31.6 pg      MCHC 33.4 g/dL      RDW 15.5 (H) %      RDW-SD 53.4 fl      MPV 9.9 fL      Platelets 349 10*3/mm3     Comprehensive Metabolic Panel [[id number]]  (Abnormal) Collected:  04/13/17 1830    Specimen:  Blood Updated:  04/13/17 1905     Glucose 124 (H) mg/dL      BUN 18 mg/dL      Creatinine 1.54 (H) mg/dL      Sodium 140 mmol/L      Potassium 4.6 mmol/L      Chloride 105 mmol/L      CO2 24.0 mmol/L      Calcium 8.5 mg/dL      Total Protein 6.8 g/dL      Albumin 3.10 (L) g/dL      ALT (SGPT) <15 U/L      AST (SGOT) 28 U/L      Alkaline Phosphatase 115 U/L      Total Bilirubin 0.7 mg/dL      eGFR Non African Amer 34 (L) mL/min/1.73      Globulin 3.7 gm/dL      A/G Ratio 0.8 (L) g/dL      BUN/Creatinine Ratio 11.7     Anion Gap 11.0 mmol/L     Troponin [[id number]]  (Normal) Collected:  04/13/17 1830    Specimen:  Blood Updated:  04/13/17 1916     Troponin I 0.013 ng/mL     POC Glucose Fingerstick [[id number]]  (Abnormal) Collected:  04/14/17 0007    Specimen:  Blood  Updated:  04/14/17 0018     Glucose 140 (H) mg/dL       : [id number] Stewart ThucMeter ID: [id number]       CBC (No Diff) [[id number]]  (Abnormal) Collected:  04/14/17 0506    Specimen:  Blood Updated:  04/14/17 0529     WBC 14.55 (H) 10*3/mm3      RBC 4.07 (L) 10*6/mm3      Hemoglobin 12.6 g/dL      Hematocrit 38.7 %      MCV 95.1 fL      MCH 31.0 pg      MCHC 32.6 (L) g/dL      RDW 15.7 (H) %      RDW-SD 54.4 (H) fl      MPV 9.8 fL      Platelets 331 10*3/mm3     Comprehensive Metabolic Panel [[id number]]  (Abnormal) Collected:  04/14/17 0506    Specimen:  Blood Updated:  04/14/17 0534     Glucose 137 (H) mg/dL      BUN 18 mg/dL      Creatinine 1.43 (H) mg/dL      Sodium 138 mmol/L      Potassium 4.2 mmol/L      Chloride 107 mmol/L      CO2 25.0 mmol/L      Calcium 8.2 (L) mg/dL      Total Protein 6.5 g/dL      Albumin 3.00 (L) g/dL      ALT (SGPT) 17 U/L      AST (SGOT) 23 U/L      Alkaline Phosphatase 113 U/L      Total Bilirubin 0.5 mg/dL      eGFR Non African Amer 38 (L) mL/min/1.73      Globulin 3.5 gm/dL      A/G Ratio 0.9 (L) g/dL      BUN/Creatinine Ratio 12.6     Anion Gap 6.0 mmol/L     Lipid Panel [[id number]]  (Abnormal) Collected:  04/14/17 0506    Specimen:  Blood Updated:  04/14/17 0545     Total Cholesterol 183 mg/dL      Triglycerides 500 (H) mg/dL      HDL Cholesterol 25 (L) mg/dL      LDL Cholesterol  110 (H) mg/dL      LDL/HDL Ratio --      Unable to calculate       POC Glucose Fingerstick [[id number]]  (Abnormal) Collected:  04/14/17 0541    Specimen:  Blood Updated:  04/14/17 0552     Glucose 140 (H) mg/dL       : [id number] Stewart ThucMeter ID: [id number]       TSH [[id number]]  (Normal) Collected:  04/14/17 0506    Specimen:  Blood Updated:  04/14/17 0614     TSH 0.912 mIU/mL     Hemoglobin A1c [[id number]] Collected:  04/14/17 0506    Specimen:  Blood Updated:  04/14/17 0652     Hemoglobin A1C 8.6 %     Narrative:       Less than 6.0           Non-Diabetic Range  6.0-7.0                 ADA  Therapeutic Target  Greater than 7.0        Action Suggested    POC Glucose Fingerstick [[id number]]  (Abnormal) Collected:  04/14/17 1153    Specimen:  Blood Updated:  04/14/17 1204     Glucose 178 (H) mg/dL       : [id number] Sullivan ShelbyMeter ID: [id number]       P2Y12 Platelet Inhibition [[id number]] Collected:  04/14/17 1158    Specimen:  Blood Updated:  04/14/17 1251     Hematocrit 41.6 %      Platelets 373 10*3/mm3      P2Y12 Reactivity Unit 218 PRU     Narrative:       PRU reference range 194-418 is for patients not receiving P2Y12 drug. Post Drug results: Lower PRU levels are associated with expected antiplatelet effect. Values may be below the stated reference range above. The post-drug PRU values reported are .        POC Glucose Fingerstick [[id number]]  (Abnormal) Collected:  04/14/17 1655    Specimen:  Blood Updated:  04/14/17 1707     Glucose 164 (H) mg/dL       : [id number] Nate HexaformerbyMeter ID: [id number]       POC Glucose Fingerstick [[id number]]  (Abnormal) Collected:  04/14/17 2116    Specimen:  Blood Updated:  04/14/17 2127     Glucose 146 (H) mg/dL       : [id number] StewartCultureMapucMeter ID: [id number]       Urinalysis With / Culture If Indicated [[id number]]  (Normal) Collected:  04/15/17 0419    Specimen:  Urine from Urine, Clean Catch Updated:  04/15/17 0443     Color, UA Yellow     Appearance, UA Clear     pH, UA <=5.0     Specific Gravity, UA 1.014     Glucose, UA Negative     Ketones, UA Negative     Bilirubin, UA Negative     Blood, UA Negative     Protein, UA Negative     Leuk Esterase, UA Negative     Nitrite, UA Negative     Urobilinogen, UA 0.2 E.U./dL    Narrative:       Urine microscopic not indicated.    CBC (No Diff) [[id number]]  (Abnormal) Collected:  04/15/17 0542    Specimen:  Blood Updated:  04/15/17 0554     WBC 14.83 (H) 10*3/mm3      RBC 4.06 (L) 10*6/mm3      Hemoglobin 12.7 g/dL      Hematocrit 38.1 %      MCV 93.8 fL      MCH 31.3 pg      MCHC 33.3 g/dL      RDW 15.1  (H) %      RDW-SD 52.0 fl      MPV 10.0 fL      Platelets 338 10*3/mm3     Comprehensive Metabolic Panel [[id number]]  (Abnormal) Collected:  04/15/17 0542    Specimen:  Blood Updated:  04/15/17 0602     Glucose 136 (H) mg/dL      BUN 18 mg/dL      Creatinine 1.32 mg/dL      Sodium 137 mmol/L      Potassium 4.3 mmol/L      Chloride 105 mmol/L      CO2 25.0 mmol/L      Calcium 8.5 mg/dL      Total Protein 6.8 g/dL      Albumin 3.00 (L) g/dL      ALT (SGPT) <15 U/L      AST (SGOT) 19 U/L      Alkaline Phosphatase 119 U/L      Total Bilirubin 0.6 mg/dL      eGFR Non African Amer 41 (L) mL/min/1.73      Globulin 3.8 gm/dL      A/G Ratio 0.8 (L) g/dL      BUN/Creatinine Ratio 13.6     Anion Gap 7.0 mmol/L         Imaging Results (last 72 hours)     Procedure Component Value Units Date/Time    US Carotid Bilateral [[id number]] Collected:  04/14/17 1514     Updated:  04/14/17 1518    Narrative:       History: Stroke       Impression:       Impression:  1. There is less than 50% stenosis of the right internal carotid artery.  2. There is less than 50% stenosis of the left internal carotid artery.  3. Antegrade flow is demonstrated in bilateral vertebral arteries.     Comments: Bilateral carotid vertebral arterial duplex scan was  performed.     Grayscale imaging shows intimal thickening and calcified elements at the  carotid bifurcation. The right internal carotid artery peak systolic  velocity is 90.9 cm/sec. The end-diastolic velocity is 25.2 cm/sec. The  right ICA/CCA ratio is approximately 1.09 . These findings correlate  with less than 50% stenosis of the right internal carotid artery.     Grayscale imaging shows intimal thickening and calcified elements at the  carotid bifurcation. The left internal carotid artery peak systolic  velocity is 102 cm/sec. The end-diastolic velocity is 41 cm/sec. The  left ICA/CCA ratio is approximately 1.0 . These findings correlate with  less than 50% stenosis of the left internal carotid  artery.     Antegrade flow is demonstrated in bilateral vertebral arteries.       This report was finalized on 04/14/2017 15:15 by Dr. Ashutosh Almanza MD.        · Left ventricular wall thickness is consistent with mild-to-moderate concentric hypertrophy.  · Left ventricular function is normal. Estimated EF appears to be in the range of 61 - 65%.  · Patent foramen ovale present.  · Estimated right ventricular systolic pressure from tricuspid regurgitation is normal (<35 mmHg).      Hospital Course  Patient is a 61 y.o. female presented with a history significant for 2 myocardial infarction status post stenting, diabetes mellitus, hypertension, chronic instructed pulmonary disease, congestive heart failure who presented to an outside hospital with right arm and leg weakness along with right lower facial drooping.  She initially presented to [facility].  CT of head without contrast was unremarkable.  An MRI showed a left parietal-occipital acute ischemic stroke.  She was transferred here for further neurologic care.  Transthoracic echocardiogram showed no signs of valvular abnormality's.  She had a preserved ejection fraction.  PFO was present.  Carotid ultrasonography showed no signs of carotid stenoses.  Speech occupational physical therapy evaluated the patient and found no therapy needs.  She was found to have elevated cholesterol panels including extremely elevated triglycerides and elevated LDL buccal.  In addition to this her hemoglobin A1c was found to be 8.6.  She also showed signs of elevated renal function and a leukocytosis.  Internal medicine consult was obtained and suggested increase glycemic control to be performed as an outpatient fiber primary care physician.  A urinalysis showed no evidence of urinary tract infection to explain the leukocytosis.    On the day of discharge her neurologic exam was unremarkable.  NIH stroke scale was 0.  Arms and legs were equal and antigravity  bilaterally.  She had no bulbar abnormalities on examination.  She did have a Plavix inhibition test which showed efficacy of her Plavix.  She will continue underwent platelet therapy of Plavix and aspirin.  This was initially started by cardiology secondary to coronary artery disease status post stenting.  We will continue with this currently.  I did stop her home statin has start high-dose Lipitor 80 mg by mouth daily at bedtime.  In addition to that I am adding TriCor secondary to her hypertriglyceridemia.  I counseled her extensively about the importance of tobacco cessation.  In addition to this I counseled her about diet next her size and the importance of compliance with antihypertensives along with increase glycemic control.  She currently is on maximal mental management including 2 antiplatelets and a statin agent and yet continues to show signs of arterial disease such as her to coronary artery disease events along with this new stroke.  Without drastic lifestyle modification it is my suggestion that she is high risk for continued arterial disease.      Discharge Disposition  Home or Self Care    Discharge Medications   WoodruffZana guerrana   Home Medication Instructions KIRK:[id number]    Printed on:04/15/17 9848   Medication Information                      albuterol (PROVENTIL HFA;VENTOLIN HFA) 108 (90 BASE) MCG/ACT inhaler  Inhale 2 puffs Every 4 (Four) Hours As Needed for wheezing.             aspirin 81 MG chewable tablet  Chew 1 tablet Daily.             atorvastatin (LIPITOR) 80 MG tablet  Take 1 tablet by mouth Every Night.             clopidogrel (PLAVIX) 75 MG tablet  Take 1 tablet by mouth Daily.             Cyanocobalamin (B-12) 1000 MCG/ML kit  Inject 1,000 mcg as directed 1 (One) Time Per Week. On mondays.             diazePAM (VALIUM) 5 MG tablet  Take 5 mg by mouth 2 (Two) Times a Day As Needed for anxiety.             DULoxetine (CYMBALTA) 30 MG capsule  Take 30 mg by mouth Daily.              esomeprazole (nexIUM) 40 MG capsule  Take 40 mg by mouth 2 (Two) Times a Day.             Exenatide ER (BYDUREON) 2 MG pen-injector  Inject 2 mg under the skin 1 (One) Time Per Week. Tuesday             fenofibrate (TRICOR) 145 MG tablet  Take 1 tablet by mouth Daily.             folic acid (FOLVITE) 1 MG tablet  Take 1 mg by mouth Daily.             furosemide (LASIX) 20 MG tablet  Take 20 mg by mouth Daily As Needed.             lisinopril (PRINIVIL,ZESTRIL) 20 MG tablet  Take 20 mg by mouth Daily.             metoprolol succinate XL (TOPROL-XL) 100 MG 24 hr tablet  Take 100 mg by mouth Daily.             oxyCODONE-acetaminophen (PERCOCET)  MG per tablet  Take 1 tablet by mouth Every 6 (Six) Hours As Needed for moderate pain (4-6).             potassium chloride (K-DUR) 10 MEQ CR tablet  Take 20 mEq by mouth 2 (Two) Times a Day.             pregabalin (LYRICA) 100 MG capsule  Take 100 mg by mouth 3 (Three) Times a Day.             promethazine (PHENERGAN) 25 MG tablet  Take 25 mg by mouth Every 6 (Six) Hours As Needed for nausea or vomiting.             rOPINIRole (REQUIP) 2 MG tablet  Take 2 mg by mouth Every Night.             SITagliptin (JANUVIA) 100 MG tablet  Take 100 mg by mouth Daily.             Vitamin D, Cholecalciferol, (CHOLECALCIFEROL) 400 UNITS tablet  Take 400 Units by mouth Daily.                 Discharge Diet:   Low carb  Activity at Discharge:   Slowly resume normal activity  Follow-up Appointments  Dr. Lau her PCP in 1 week  Neurology outpatient in 3 weeks.    Test Results Pending at Discharge  none     Jack Mcgregor MD  04/15/17  11:35 AM    Time: Discharge 40 min             Electronically signed by Jack Mcgregor MD at 4/15/2017 11:36 AM

## 2017-07-03 RX ORDER — FENOFIBRATE 145 MG/1
TABLET, COATED ORAL
Qty: 30 TABLET | Refills: 0 | OUTPATIENT
Start: 2017-07-03

## 2017-07-03 RX ORDER — ATORVASTATIN CALCIUM 80 MG/1
TABLET, FILM COATED ORAL
Qty: 30 TABLET | Refills: 0 | OUTPATIENT
Start: 2017-07-03

## 2017-07-10 RX ORDER — ATORVASTATIN CALCIUM 80 MG/1
TABLET, FILM COATED ORAL
Qty: 30 TABLET | Refills: 0 | OUTPATIENT
Start: 2017-07-10

## 2017-07-10 RX ORDER — FENOFIBRATE 145 MG/1
TABLET, COATED ORAL
Qty: 30 TABLET | Refills: 0 | OUTPATIENT
Start: 2017-07-10

## 2017-07-18 RX ORDER — FENOFIBRATE 145 MG/1
TABLET, COATED ORAL
Qty: 30 TABLET | Refills: 0 | OUTPATIENT
Start: 2017-07-18

## 2018-05-03 ENCOUNTER — OUTSIDE FACILITY SERVICE (OUTPATIENT)
Dept: CARDIOLOGY | Facility: CLINIC | Age: 63
End: 2018-05-03

## 2018-05-03 PROCEDURE — 93010 ELECTROCARDIOGRAM REPORT: CPT | Performed by: INTERNAL MEDICINE

## 2018-05-06 ENCOUNTER — OUTSIDE FACILITY SERVICE (OUTPATIENT)
Dept: CARDIOLOGY | Facility: CLINIC | Age: 63
End: 2018-05-06

## 2018-05-06 PROCEDURE — 93010 ELECTROCARDIOGRAM REPORT: CPT | Performed by: INTERNAL MEDICINE

## 2018-10-29 ENCOUNTER — APPOINTMENT (OUTPATIENT)
Dept: ULTRASOUND IMAGING | Facility: HOSPITAL | Age: 63
End: 2018-10-29

## 2018-10-29 ENCOUNTER — APPOINTMENT (OUTPATIENT)
Dept: CARDIOLOGY | Facility: HOSPITAL | Age: 63
End: 2018-10-29
Attending: PSYCHIATRY & NEUROLOGY

## 2018-10-29 ENCOUNTER — HOSPITAL ENCOUNTER (INPATIENT)
Facility: HOSPITAL | Age: 63
LOS: 2 days | Discharge: HOME | End: 2018-10-31
Attending: PSYCHIATRY & NEUROLOGY | Admitting: PSYCHIATRY & NEUROLOGY

## 2018-10-29 ENCOUNTER — APPOINTMENT (OUTPATIENT)
Dept: MRI IMAGING | Facility: HOSPITAL | Age: 63
End: 2018-10-29

## 2018-10-29 DIAGNOSIS — R47.01 EXPRESSIVE APHASIA: Primary | ICD-10-CM

## 2018-10-29 DIAGNOSIS — Z74.09 IMPAIRED MOBILITY: ICD-10-CM

## 2018-10-29 DIAGNOSIS — Z78.9 IMPAIRED MOBILITY AND ADLS: ICD-10-CM

## 2018-10-29 DIAGNOSIS — Z74.09 IMPAIRED MOBILITY AND ADLS: ICD-10-CM

## 2018-10-29 LAB
BH CV ECHO MEAS - AO MAX PG (FULL): 3.9 MMHG
BH CV ECHO MEAS - AO MAX PG: 7.8 MMHG
BH CV ECHO MEAS - AO MEAN PG (FULL): 2 MMHG
BH CV ECHO MEAS - AO MEAN PG: 4 MMHG
BH CV ECHO MEAS - AO ROOT AREA (BSA CORRECTED): 1.6
BH CV ECHO MEAS - AO ROOT AREA: 10.8 CM^2
BH CV ECHO MEAS - AO ROOT DIAM: 3.7 CM
BH CV ECHO MEAS - AO V2 MAX: 140 CM/SEC
BH CV ECHO MEAS - AO V2 MEAN: 99.4 CM/SEC
BH CV ECHO MEAS - AO V2 VTI: 33.9 CM
BH CV ECHO MEAS - AVA(I,A): 2.1 CM^2
BH CV ECHO MEAS - AVA(I,D): 2.1 CM^2
BH CV ECHO MEAS - AVA(V,A): 2.2 CM^2
BH CV ECHO MEAS - AVA(V,D): 2.2 CM^2
BH CV ECHO MEAS - BSA(HAYCOCK): 2.4 M^2
BH CV ECHO MEAS - BSA: 2.2 M^2
BH CV ECHO MEAS - BZI_BMI: 44.8 KILOGRAMS/M^2
BH CV ECHO MEAS - BZI_METRIC_HEIGHT: 165.1 CM
BH CV ECHO MEAS - BZI_METRIC_WEIGHT: 122 KG
BH CV ECHO MEAS - EDV(CUBED): 311.7 ML
BH CV ECHO MEAS - EDV(MOD-SP4): 107 ML
BH CV ECHO MEAS - EDV(TEICH): 237.7 ML
BH CV ECHO MEAS - EF(CUBED): 63.6 %
BH CV ECHO MEAS - EF(MOD-SP4): 64.9 %
BH CV ECHO MEAS - EF(TEICH): 53.9 %
BH CV ECHO MEAS - ESV(CUBED): 113.4 ML
BH CV ECHO MEAS - ESV(MOD-SP4): 37.6 ML
BH CV ECHO MEAS - ESV(TEICH): 109.6 ML
BH CV ECHO MEAS - FS: 28.6 %
BH CV ECHO MEAS - IVS/LVPW: 0.98
BH CV ECHO MEAS - IVSD: 0.81 CM
BH CV ECHO MEAS - LA DIMENSION: 3.6 CM
BH CV ECHO MEAS - LA/AO: 0.97
BH CV ECHO MEAS - LV DIASTOLIC VOL/BSA (35-75): 47.7 ML/M^2
BH CV ECHO MEAS - LV MASS(C)D: 237.6 GRAMS
BH CV ECHO MEAS - LV MASS(C)DI: 105.9 GRAMS/M^2
BH CV ECHO MEAS - LV MAX PG: 4 MMHG
BH CV ECHO MEAS - LV MEAN PG: 2 MMHG
BH CV ECHO MEAS - LV SYSTOLIC VOL/BSA (12-30): 16.8 ML/M^2
BH CV ECHO MEAS - LV V1 MAX: 99.4 CM/SEC
BH CV ECHO MEAS - LV V1 MEAN: 65.2 CM/SEC
BH CV ECHO MEAS - LV V1 VTI: 22.7 CM
BH CV ECHO MEAS - LVIDD: 6.8 CM
BH CV ECHO MEAS - LVIDS: 4.8 CM
BH CV ECHO MEAS - LVLD AP4: 8.1 CM
BH CV ECHO MEAS - LVLS AP4: 6.7 CM
BH CV ECHO MEAS - LVOT AREA (M): 3.1 CM^2
BH CV ECHO MEAS - LVOT AREA: 3.1 CM^2
BH CV ECHO MEAS - LVOT DIAM: 2 CM
BH CV ECHO MEAS - LVPWD: 0.83 CM
BH CV ECHO MEAS - MV A MAX VEL: 84.8 CM/SEC
BH CV ECHO MEAS - MV DEC TIME: 0.19 SEC
BH CV ECHO MEAS - MV E MAX VEL: 72.1 CM/SEC
BH CV ECHO MEAS - MV E/A: 0.85
BH CV ECHO MEAS - RAP SYSTOLE: 5 MMHG
BH CV ECHO MEAS - RVSP: 9.3 MMHG
BH CV ECHO MEAS - SI(AO): 162.5 ML/M^2
BH CV ECHO MEAS - SI(CUBED): 88.4 ML/M^2
BH CV ECHO MEAS - SI(LVOT): 31.8 ML/M^2
BH CV ECHO MEAS - SI(MOD-SP4): 30.9 ML/M^2
BH CV ECHO MEAS - SI(TEICH): 57.1 ML/M^2
BH CV ECHO MEAS - SV(AO): 364.5 ML
BH CV ECHO MEAS - SV(CUBED): 198.3 ML
BH CV ECHO MEAS - SV(LVOT): 71.3 ML
BH CV ECHO MEAS - SV(MOD-SP4): 69.4 ML
BH CV ECHO MEAS - SV(TEICH): 128 ML
BH CV ECHO MEAS - TR MAX VEL: 104 CM/SEC
GLUCOSE BLDC GLUCOMTR-MCNC: 142 MG/DL (ref 70–130)
GLUCOSE BLDC GLUCOMTR-MCNC: 187 MG/DL (ref 70–130)
GLUCOSE BLDC GLUCOMTR-MCNC: 202 MG/DL (ref 70–130)
GLUCOSE BLDC GLUCOMTR-MCNC: 204 MG/DL (ref 70–130)
MAXIMAL PREDICTED HEART RATE: 160 BPM
STRESS TARGET HR: 136 BPM

## 2018-10-29 PROCEDURE — 63710000001 INSULIN LISPRO (HUMAN) PER 5 UNITS: Performed by: INTERNAL MEDICINE

## 2018-10-29 PROCEDURE — 70551 MRI BRAIN STEM W/O DYE: CPT

## 2018-10-29 PROCEDURE — G9162 LANG EXPRESS CURRENT STATUS: HCPCS

## 2018-10-29 PROCEDURE — 99291 CRITICAL CARE FIRST HOUR: CPT | Performed by: PSYCHIATRY & NEUROLOGY

## 2018-10-29 PROCEDURE — 25010000002 PERFLUTREN 6.52 MG/ML SUSPENSION: Performed by: PSYCHIATRY & NEUROLOGY

## 2018-10-29 PROCEDURE — 93306 TTE W/DOPPLER COMPLETE: CPT | Performed by: INTERNAL MEDICINE

## 2018-10-29 PROCEDURE — 92523 SPEECH SOUND LANG COMPREHEN: CPT

## 2018-10-29 PROCEDURE — 93880 EXTRACRANIAL BILAT STUDY: CPT

## 2018-10-29 PROCEDURE — 93880 EXTRACRANIAL BILAT STUDY: CPT | Performed by: SURGERY

## 2018-10-29 PROCEDURE — G9164 LANG EXPRESS D/C STATUS: HCPCS

## 2018-10-29 PROCEDURE — 93306 TTE W/DOPPLER COMPLETE: CPT

## 2018-10-29 PROCEDURE — 82962 GLUCOSE BLOOD TEST: CPT

## 2018-10-29 PROCEDURE — G9163 LANG EXPRESS GOAL STATUS: HCPCS

## 2018-10-29 RX ORDER — SODIUM CHLORIDE 0.9 % (FLUSH) 0.9 %
3 SYRINGE (ML) INJECTION EVERY 12 HOURS SCHEDULED
Status: DISCONTINUED | OUTPATIENT
Start: 2018-10-29 | End: 2018-10-31 | Stop reason: HOSPADM

## 2018-10-29 RX ORDER — ATORVASTATIN CALCIUM 40 MG/1
80 TABLET, FILM COATED ORAL NIGHTLY
Status: DISCONTINUED | OUTPATIENT
Start: 2018-10-29 | End: 2018-10-31 | Stop reason: HOSPADM

## 2018-10-29 RX ORDER — FUROSEMIDE 20 MG/1
20 TABLET ORAL DAILY
Status: DISCONTINUED | OUTPATIENT
Start: 2018-10-29 | End: 2018-10-30

## 2018-10-29 RX ORDER — ALBUTEROL SULFATE 90 UG/1
2 AEROSOL, METERED RESPIRATORY (INHALATION)
Status: DISCONTINUED | OUTPATIENT
Start: 2018-10-29 | End: 2018-10-29 | Stop reason: ALTCHOICE

## 2018-10-29 RX ORDER — LISINOPRIL 20 MG/1
20 TABLET ORAL DAILY
Status: DISCONTINUED | OUTPATIENT
Start: 2018-10-29 | End: 2018-10-30 | Stop reason: SDUPTHER

## 2018-10-29 RX ORDER — SODIUM CHLORIDE 0.9 % (FLUSH) 0.9 %
3-10 SYRINGE (ML) INJECTION AS NEEDED
Status: DISCONTINUED | OUTPATIENT
Start: 2018-10-29 | End: 2018-10-31 | Stop reason: HOSPADM

## 2018-10-29 RX ORDER — DULOXETIN HYDROCHLORIDE 30 MG/1
30 CAPSULE, DELAYED RELEASE ORAL DAILY
Status: DISCONTINUED | OUTPATIENT
Start: 2018-10-29 | End: 2018-10-30

## 2018-10-29 RX ORDER — NICOTINE 21 MG/24HR
1 PATCH, TRANSDERMAL 24 HOURS TRANSDERMAL
Status: DISCONTINUED | OUTPATIENT
Start: 2018-10-29 | End: 2018-10-31 | Stop reason: HOSPADM

## 2018-10-29 RX ORDER — FOLIC ACID 1 MG/1
1 TABLET ORAL DAILY
Status: DISCONTINUED | OUTPATIENT
Start: 2018-10-29 | End: 2018-10-31 | Stop reason: HOSPADM

## 2018-10-29 RX ORDER — ALBUTEROL SULFATE 2.5 MG/3ML
2.5 SOLUTION RESPIRATORY (INHALATION)
Status: DISCONTINUED | OUTPATIENT
Start: 2018-10-29 | End: 2018-10-29

## 2018-10-29 RX ORDER — ROPINIROLE 1 MG/1
2 TABLET, FILM COATED ORAL NIGHTLY
Status: DISCONTINUED | OUTPATIENT
Start: 2018-10-29 | End: 2018-10-31 | Stop reason: HOSPADM

## 2018-10-29 RX ORDER — DEXTROSE MONOHYDRATE 25 G/50ML
25 INJECTION, SOLUTION INTRAVENOUS
Status: DISCONTINUED | OUTPATIENT
Start: 2018-10-29 | End: 2018-10-31 | Stop reason: HOSPADM

## 2018-10-29 RX ORDER — ALBUTEROL SULFATE 2.5 MG/3ML
2.5 SOLUTION RESPIRATORY (INHALATION) EVERY 4 HOURS PRN
Status: DISCONTINUED | OUTPATIENT
Start: 2018-10-29 | End: 2018-10-31 | Stop reason: HOSPADM

## 2018-10-29 RX ORDER — OXYCODONE AND ACETAMINOPHEN 10; 325 MG/1; MG/1
1 TABLET ORAL EVERY 6 HOURS PRN
Status: DISCONTINUED | OUTPATIENT
Start: 2018-10-29 | End: 2018-10-31 | Stop reason: HOSPADM

## 2018-10-29 RX ORDER — ASPIRIN 325 MG
325 TABLET ORAL DAILY
Status: DISCONTINUED | OUTPATIENT
Start: 2018-10-30 | End: 2018-10-31 | Stop reason: HOSPADM

## 2018-10-29 RX ORDER — PREGABALIN 100 MG/1
100 CAPSULE ORAL 3 TIMES DAILY
Status: DISCONTINUED | OUTPATIENT
Start: 2018-10-29 | End: 2018-10-31 | Stop reason: HOSPADM

## 2018-10-29 RX ORDER — POTASSIUM CHLORIDE 1.5 G/1.77G
20 POWDER, FOR SOLUTION ORAL 2 TIMES DAILY
Status: DISCONTINUED | OUTPATIENT
Start: 2018-10-29 | End: 2018-10-30

## 2018-10-29 RX ORDER — ATORVASTATIN CALCIUM 40 MG/1
80 TABLET, FILM COATED ORAL NIGHTLY
Status: DISCONTINUED | OUTPATIENT
Start: 2018-10-29 | End: 2018-10-29 | Stop reason: SDUPTHER

## 2018-10-29 RX ORDER — ASPIRIN 300 MG/1
300 SUPPOSITORY RECTAL DAILY
Status: DISCONTINUED | OUTPATIENT
Start: 2018-10-30 | End: 2018-10-31 | Stop reason: HOSPADM

## 2018-10-29 RX ORDER — NICOTINE POLACRILEX 4 MG
15 LOZENGE BUCCAL
Status: DISCONTINUED | OUTPATIENT
Start: 2018-10-29 | End: 2018-10-31 | Stop reason: HOSPADM

## 2018-10-29 RX ORDER — DIAZEPAM 5 MG/1
5 TABLET ORAL 2 TIMES DAILY PRN
Status: DISCONTINUED | OUTPATIENT
Start: 2018-10-29 | End: 2018-10-31 | Stop reason: HOSPADM

## 2018-10-29 RX ORDER — PANTOPRAZOLE SODIUM 40 MG/1
40 TABLET, DELAYED RELEASE ORAL EVERY MORNING
Status: DISCONTINUED | OUTPATIENT
Start: 2018-10-29 | End: 2018-10-31 | Stop reason: HOSPADM

## 2018-10-29 RX ADMIN — OXYCODONE HYDROCHLORIDE AND ACETAMINOPHEN 1 TABLET: 10; 325 TABLET ORAL at 20:01

## 2018-10-29 RX ADMIN — INSULIN LISPRO 4 UNITS: 100 INJECTION, SOLUTION INTRAVENOUS; SUBCUTANEOUS at 20:00

## 2018-10-29 RX ADMIN — LISINOPRIL 20 MG: 20 TABLET ORAL at 14:05

## 2018-10-29 RX ADMIN — Medication 3 ML: at 20:25

## 2018-10-29 RX ADMIN — INSULIN LISPRO 4 UNITS: 100 INJECTION, SOLUTION INTRAVENOUS; SUBCUTANEOUS at 17:11

## 2018-10-29 RX ADMIN — ROPINIROLE HYDROCHLORIDE 2 MG: 1 TABLET, FILM COATED ORAL at 20:01

## 2018-10-29 RX ADMIN — FUROSEMIDE 20 MG: 20 TABLET ORAL at 14:05

## 2018-10-29 RX ADMIN — PREGABALIN 100 MG: 100 CAPSULE ORAL at 15:26

## 2018-10-29 RX ADMIN — DESMOPRESSIN ACETATE 80 MG: 0.2 TABLET ORAL at 20:01

## 2018-10-29 RX ADMIN — PREGABALIN 100 MG: 100 CAPSULE ORAL at 20:25

## 2018-10-29 RX ADMIN — PERFLUTREN: 6.52 INJECTION, SUSPENSION INTRAVENOUS at 13:56

## 2018-10-29 RX ADMIN — DULOXETINE HYDROCHLORIDE 30 MG: 30 CAPSULE, DELAYED RELEASE ORAL at 14:05

## 2018-10-29 RX ADMIN — OXYCODONE HYDROCHLORIDE AND ACETAMINOPHEN 1 TABLET: 10; 325 TABLET ORAL at 14:06

## 2018-10-29 RX ADMIN — POTASSIUM CHLORIDE 20 MEQ: 1.5 POWDER, FOR SOLUTION ORAL at 20:00

## 2018-10-29 RX ADMIN — FOLIC ACID 1 MG: 1 TABLET ORAL at 14:05

## 2018-10-29 RX ADMIN — NICOTINE 1 PATCH: 14 PATCH, EXTENDED RELEASE TRANSDERMAL at 14:53

## 2018-10-29 RX ADMIN — POTASSIUM CHLORIDE 20 MEQ: 1.5 POWDER, FOR SOLUTION ORAL at 14:06

## 2018-10-29 RX ADMIN — PANTOPRAZOLE SODIUM 40 MG: 40 TABLET, DELAYED RELEASE ORAL at 14:06

## 2018-10-30 ENCOUNTER — APPOINTMENT (OUTPATIENT)
Dept: CT IMAGING | Facility: HOSPITAL | Age: 63
End: 2018-10-30

## 2018-10-30 LAB
ARTICHOKE IGE QN: 187 MG/DL (ref 0–99)
CHOLEST SERPL-MCNC: 287 MG/DL (ref 130–200)
GLUCOSE BLDC GLUCOMTR-MCNC: 165 MG/DL (ref 70–130)
GLUCOSE BLDC GLUCOMTR-MCNC: 177 MG/DL (ref 70–130)
GLUCOSE BLDC GLUCOMTR-MCNC: 210 MG/DL (ref 70–130)
GLUCOSE BLDC GLUCOMTR-MCNC: 251 MG/DL (ref 70–130)
HBA1C MFR BLD: 7.3 %
HDLC SERPL-MCNC: 36 MG/DL
LDLC/HDLC SERPL: ABNORMAL {RATIO}
TRIGL SERPL-MCNC: 583 MG/DL (ref 0–149)

## 2018-10-30 PROCEDURE — G8987 SELF CARE CURRENT STATUS: HCPCS

## 2018-10-30 PROCEDURE — G8989 SELF CARE D/C STATUS: HCPCS

## 2018-10-30 PROCEDURE — G8980 MOBILITY D/C STATUS: HCPCS

## 2018-10-30 PROCEDURE — G8978 MOBILITY CURRENT STATUS: HCPCS

## 2018-10-30 PROCEDURE — 80061 LIPID PANEL: CPT | Performed by: PSYCHIATRY & NEUROLOGY

## 2018-10-30 PROCEDURE — 82962 GLUCOSE BLOOD TEST: CPT

## 2018-10-30 PROCEDURE — 70450 CT HEAD/BRAIN W/O DYE: CPT

## 2018-10-30 PROCEDURE — 97165 OT EVAL LOW COMPLEX 30 MIN: CPT

## 2018-10-30 PROCEDURE — G8979 MOBILITY GOAL STATUS: HCPCS

## 2018-10-30 PROCEDURE — 63710000001 INSULIN LISPRO (HUMAN) PER 5 UNITS: Performed by: INTERNAL MEDICINE

## 2018-10-30 PROCEDURE — G8988 SELF CARE GOAL STATUS: HCPCS

## 2018-10-30 PROCEDURE — 97161 PT EVAL LOW COMPLEX 20 MIN: CPT

## 2018-10-30 PROCEDURE — 83036 HEMOGLOBIN GLYCOSYLATED A1C: CPT | Performed by: INTERNAL MEDICINE

## 2018-10-30 PROCEDURE — 99233 SBSQ HOSP IP/OBS HIGH 50: CPT | Performed by: PSYCHIATRY & NEUROLOGY

## 2018-10-30 RX ORDER — RANITIDINE 300 MG/1
300 TABLET ORAL NIGHTLY
COMMUNITY

## 2018-10-30 RX ORDER — FUROSEMIDE 20 MG/1
20 TABLET ORAL DAILY PRN
Status: DISCONTINUED | OUTPATIENT
Start: 2018-10-30 | End: 2018-10-31 | Stop reason: HOSPADM

## 2018-10-30 RX ORDER — DIPHENHYDRAMINE HCL 25 MG
25 CAPSULE ORAL EVERY 4 HOURS PRN
COMMUNITY

## 2018-10-30 RX ORDER — ENALAPRILAT 2.5 MG/2ML
0.62 INJECTION INTRAVENOUS EVERY 6 HOURS PRN
Status: CANCELLED | OUTPATIENT
Start: 2018-10-30

## 2018-10-30 RX ORDER — ALBUTEROL SULFATE 2.5 MG/3ML
2.5 SOLUTION RESPIRATORY (INHALATION) EVERY 4 HOURS PRN
COMMUNITY

## 2018-10-30 RX ORDER — PREGABALIN 150 MG/1
150 CAPSULE ORAL 2 TIMES DAILY
COMMUNITY

## 2018-10-30 RX ORDER — LOSARTAN POTASSIUM AND HYDROCHLOROTHIAZIDE 25; 100 MG/1; MG/1
1 TABLET ORAL DAILY
COMMUNITY

## 2018-10-30 RX ADMIN — PREGABALIN 100 MG: 100 CAPSULE ORAL at 21:08

## 2018-10-30 RX ADMIN — PREGABALIN 100 MG: 100 CAPSULE ORAL at 18:02

## 2018-10-30 RX ADMIN — OXYCODONE HYDROCHLORIDE AND ACETAMINOPHEN 1 TABLET: 10; 325 TABLET ORAL at 11:03

## 2018-10-30 RX ADMIN — POTASSIUM CHLORIDE 20 MEQ: 1.5 POWDER, FOR SOLUTION ORAL at 08:14

## 2018-10-30 RX ADMIN — FOLIC ACID 1 MG: 1 TABLET ORAL at 08:13

## 2018-10-30 RX ADMIN — INSULIN LISPRO 6 UNITS: 100 INJECTION, SOLUTION INTRAVENOUS; SUBCUTANEOUS at 18:02

## 2018-10-30 RX ADMIN — LINAGLIPTIN 5 MG: 5 TABLET, FILM COATED ORAL at 08:13

## 2018-10-30 RX ADMIN — INSULIN LISPRO 2 UNITS: 100 INJECTION, SOLUTION INTRAVENOUS; SUBCUTANEOUS at 08:14

## 2018-10-30 RX ADMIN — DULOXETINE HYDROCHLORIDE 30 MG: 30 CAPSULE, DELAYED RELEASE ORAL at 08:13

## 2018-10-30 RX ADMIN — NICOTINE 1 PATCH: 14 PATCH, EXTENDED RELEASE TRANSDERMAL at 08:19

## 2018-10-30 RX ADMIN — DESMOPRESSIN ACETATE 80 MG: 0.2 TABLET ORAL at 21:08

## 2018-10-30 RX ADMIN — PANTOPRAZOLE SODIUM 40 MG: 40 TABLET, DELAYED RELEASE ORAL at 06:02

## 2018-10-30 RX ADMIN — OXYCODONE HYDROCHLORIDE AND ACETAMINOPHEN 1 TABLET: 10; 325 TABLET ORAL at 03:00

## 2018-10-30 RX ADMIN — INSULIN LISPRO 2 UNITS: 100 INJECTION, SOLUTION INTRAVENOUS; SUBCUTANEOUS at 21:14

## 2018-10-30 RX ADMIN — Medication 3 ML: at 08:14

## 2018-10-30 RX ADMIN — INSULIN LISPRO 4 UNITS: 100 INJECTION, SOLUTION INTRAVENOUS; SUBCUTANEOUS at 11:06

## 2018-10-30 RX ADMIN — Medication 3 ML: at 21:14

## 2018-10-30 RX ADMIN — PREGABALIN 100 MG: 100 CAPSULE ORAL at 08:14

## 2018-10-30 RX ADMIN — OXYCODONE HYDROCHLORIDE AND ACETAMINOPHEN 1 TABLET: 10; 325 TABLET ORAL at 21:13

## 2018-10-30 RX ADMIN — ROPINIROLE HYDROCHLORIDE 2 MG: 1 TABLET, FILM COATED ORAL at 21:08

## 2018-10-31 VITALS
DIASTOLIC BLOOD PRESSURE: 69 MMHG | HEIGHT: 65 IN | RESPIRATION RATE: 18 BRPM | SYSTOLIC BLOOD PRESSURE: 131 MMHG | OXYGEN SATURATION: 95 % | TEMPERATURE: 98.2 F | WEIGHT: 270.8 LBS | HEART RATE: 77 BPM | BODY MASS INDEX: 45.12 KG/M2

## 2018-10-31 LAB
ALBUMIN SERPL-MCNC: 3.4 G/DL (ref 3.5–5)
ALBUMIN/GLOB SERPL: 1.1 G/DL (ref 1.1–2.5)
ALP SERPL-CCNC: 131 U/L (ref 24–120)
ALT SERPL W P-5'-P-CCNC: 20 U/L (ref 0–54)
ANION GAP SERPL CALCULATED.3IONS-SCNC: 11 MMOL/L (ref 4–13)
AST SERPL-CCNC: 19 U/L (ref 7–45)
BILIRUB SERPL-MCNC: 0.6 MG/DL (ref 0.1–1)
BUN BLD-MCNC: 17 MG/DL (ref 5–21)
BUN/CREAT SERPL: 14.2 (ref 7–25)
CALCIUM SPEC-SCNC: 9 MG/DL (ref 8.4–10.4)
CHLORIDE SERPL-SCNC: 103 MMOL/L (ref 98–110)
CO2 SERPL-SCNC: 24 MMOL/L (ref 24–31)
CREAT BLD-MCNC: 1.2 MG/DL (ref 0.5–1.4)
DEPRECATED RDW RBC AUTO: 47.6 FL (ref 40–54)
ERYTHROCYTE [DISTWIDTH] IN BLOOD BY AUTOMATED COUNT: 14 % (ref 12–15)
GFR SERPL CREATININE-BSD FRML MDRD: 46 ML/MIN/1.73
GLOBULIN UR ELPH-MCNC: 3 GM/DL
GLUCOSE BLD-MCNC: 163 MG/DL (ref 70–100)
HCT VFR BLD AUTO: 37.3 % (ref 37–47)
HGB BLD-MCNC: 12.7 G/DL (ref 12–16)
MCH RBC QN AUTO: 31.6 PG (ref 28–32)
MCHC RBC AUTO-ENTMCNC: 34 G/DL (ref 33–36)
MCV RBC AUTO: 92.8 FL (ref 82–98)
PLATELET # BLD AUTO: 309 10*3/MM3 (ref 130–400)
PMV BLD AUTO: 10.1 FL (ref 6–12)
POTASSIUM BLD-SCNC: 4.4 MMOL/L (ref 3.5–5.3)
PROT SERPL-MCNC: 6.4 G/DL (ref 6.3–8.7)
RBC # BLD AUTO: 4.02 10*6/MM3 (ref 4.2–5.4)
SODIUM BLD-SCNC: 138 MMOL/L (ref 135–145)
WBC NRBC COR # BLD: 15 10*3/MM3 (ref 4.8–10.8)

## 2018-10-31 PROCEDURE — 25010000002 PNEUMOCOCCAL VAC POLYVALENT PER 0.5 ML: Performed by: INTERNAL MEDICINE

## 2018-10-31 PROCEDURE — G0008 ADMIN INFLUENZA VIRUS VAC: HCPCS | Performed by: INTERNAL MEDICINE

## 2018-10-31 PROCEDURE — 94799 UNLISTED PULMONARY SVC/PX: CPT

## 2018-10-31 PROCEDURE — 99406 BEHAV CHNG SMOKING 3-10 MIN: CPT

## 2018-10-31 PROCEDURE — 80053 COMPREHEN METABOLIC PANEL: CPT | Performed by: PSYCHIATRY & NEUROLOGY

## 2018-10-31 PROCEDURE — 94760 N-INVAS EAR/PLS OXIMETRY 1: CPT

## 2018-10-31 PROCEDURE — 90661 CCIIV3 VAC ABX FR 0.5 ML IM: CPT | Performed by: INTERNAL MEDICINE

## 2018-10-31 PROCEDURE — 85027 COMPLETE CBC AUTOMATED: CPT | Performed by: PSYCHIATRY & NEUROLOGY

## 2018-10-31 PROCEDURE — 25010000002 INFLUENZA VAC SUBUNIT QUAD 0.5 ML SUSPENSION PREFILLED SYRINGE: Performed by: INTERNAL MEDICINE

## 2018-10-31 PROCEDURE — 63710000001 INSULIN LISPRO (HUMAN) PER 5 UNITS: Performed by: INTERNAL MEDICINE

## 2018-10-31 PROCEDURE — 90732 PPSV23 VACC 2 YRS+ SUBQ/IM: CPT | Performed by: INTERNAL MEDICINE

## 2018-10-31 PROCEDURE — 99239 HOSP IP/OBS DSCHRG MGMT >30: CPT | Performed by: PSYCHIATRY & NEUROLOGY

## 2018-10-31 PROCEDURE — G0009 ADMIN PNEUMOCOCCAL VACCINE: HCPCS | Performed by: INTERNAL MEDICINE

## 2018-10-31 RX ADMIN — PREGABALIN 100 MG: 100 CAPSULE ORAL at 09:22

## 2018-10-31 RX ADMIN — Medication 3 ML: at 10:12

## 2018-10-31 RX ADMIN — INFLUENZA A VIRUS A/SINGAPORE/GP1908/2015 IVR-180 (H1N1) ANTIGEN (MDCK CELL DERIVED, PROPIOLACTONE INACTIVATED), INFLUENZA A VIRUS A/NORTH CAROLINA/04/2016 (H3N2) HEMAGGLUTININ ANTIGEN (MDCK CELL DERIVED, PROPIOLACTONE INACTIVATED), INFLUENZA B VIRUS B/IOWA/06/2017 HEMAGGLUTININ ANTIGEN (MDCK CELL DERIVED, PROPIOLACTONE INACTIVATED), INFLUENZA B VIRUS B/SINGAPORE/INFTT-16-0610/2016 HEMAGGLUTININ ANTIGEN (MDCK CELL DERIVED, PROPIOLACTONE INACTIVATED) 0.5 ML: 15; 15; 15; 15 INJECTION, SUSPENSION INTRAMUSCULAR at 13:23

## 2018-10-31 RX ADMIN — PNEUMOCOCCAL VACCINE POLYVALENT 0.5 ML
25; 25; 25; 25; 25; 25; 25; 25; 25; 25; 25; 25; 25; 25; 25; 25; 25; 25; 25; 25; 25; 25; 25 INJECTION, SOLUTION INTRAMUSCULAR; SUBCUTANEOUS at 13:22

## 2018-10-31 RX ADMIN — PANTOPRAZOLE SODIUM 40 MG: 40 TABLET, DELAYED RELEASE ORAL at 06:16

## 2018-10-31 RX ADMIN — NICOTINE 1 PATCH: 14 PATCH, EXTENDED RELEASE TRANSDERMAL at 10:10

## 2018-10-31 RX ADMIN — ASPIRIN 325 MG: 325 TABLET ORAL at 09:22

## 2018-10-31 RX ADMIN — OXYCODONE HYDROCHLORIDE AND ACETAMINOPHEN 1 TABLET: 10; 325 TABLET ORAL at 04:07

## 2018-10-31 RX ADMIN — OXYCODONE HYDROCHLORIDE AND ACETAMINOPHEN 1 TABLET: 10; 325 TABLET ORAL at 10:12

## 2018-10-31 RX ADMIN — INSULIN LISPRO 2 UNITS: 100 INJECTION, SOLUTION INTRAVENOUS; SUBCUTANEOUS at 09:28

## 2018-10-31 RX ADMIN — LOSARTAN POTASSIUM: 50 TABLET ORAL at 11:02

## 2018-10-31 RX ADMIN — LINAGLIPTIN 5 MG: 5 TABLET, FILM COATED ORAL at 09:22

## 2018-10-31 RX ADMIN — FOLIC ACID 1 MG: 1 TABLET ORAL at 09:22

## 2018-11-01 ENCOUNTER — READMISSION MANAGEMENT (OUTPATIENT)
Dept: CALL CENTER | Facility: HOSPITAL | Age: 63
End: 2018-11-01

## 2018-11-01 NOTE — OUTREACH NOTE
Prep Survey      Responses   Facility patient discharged from?  [facility]   Is patient eligible?  Yes   Discharge diagnosis  Acute ischemic stroke likelyin the LMCA distribution s/p TPA-resolution on MRI, HTN, HLD, continued tobacco use, Acute resp. disease, morbid obesity, dual antiplatelet therapy, CAD   Does the patient have one of the following disease processes/diagnoses(primary or secondary)?  Stroke (TIA)   Does the patient have Home health ordered?  No   Is there a DME ordered?  No   Comments regarding appointments  See AVS   Prep survey completed?  Yes          Melanie Meza RN

## 2018-11-05 ENCOUNTER — READMISSION MANAGEMENT (OUTPATIENT)
Dept: CALL CENTER | Facility: HOSPITAL | Age: 63
End: 2018-11-05

## 2018-11-05 NOTE — OUTREACH NOTE
Stroke Week 1 Survey      Responses   Facility patient discharged from?  [facility]   Does the patient have one of the following disease processes/diagnoses(primary or secondary)?  Stroke (TIA)   Is there a successful TCM telephone encounter documented?  No   Week 1 attempt successful?  No   Unsuccessful attempts  Attempt 1          Lucio Ross RN

## 2018-11-06 ENCOUNTER — READMISSION MANAGEMENT (OUTPATIENT)
Dept: CALL CENTER | Facility: HOSPITAL | Age: 63
End: 2018-11-06

## 2018-11-06 NOTE — OUTREACH NOTE
Stroke Week 1 Survey      Responses   Facility patient discharged from?  [facility]   Does the patient have one of the following disease processes/diagnoses(primary or secondary)?  Stroke (TIA)   Is there a successful TCM telephone encounter documented?  No   Week 1 attempt successful?  No   Unsuccessful attempts  Attempt 2          Osmel Ingram RN

## 2018-11-09 ENCOUNTER — READMISSION MANAGEMENT (OUTPATIENT)
Dept: CALL CENTER | Facility: HOSPITAL | Age: 63
End: 2018-11-09

## 2018-11-09 NOTE — OUTREACH NOTE
Stroke Week 2 Survey      Responses   Facility patient discharged from?  [facility]   Does the patient have one of the following disease processes/diagnoses(primary or secondary)?  Stroke (TIA)   Week 2 attempt successful?  Yes   Rescheduled  Revoked   Revoke  Phone number issues          Lakia Preciado RN

## 2019-01-24 ENCOUNTER — CALL CENTER PROGRAMS (OUTPATIENT)
Dept: CALL CENTER | Facility: HOSPITAL | Age: 64
End: 2019-01-24

## 2019-02-07 ENCOUNTER — CALL CENTER PROGRAMS (OUTPATIENT)
Dept: CALL CENTER | Facility: HOSPITAL | Age: 64
End: 2019-02-07

## 2019-02-07 NOTE — OUTREACH NOTE
Stroke Prateek Survey      Responses   Facility patient discharged from?  [facility]   Attempt successful  No   Unsuccessful attempts  Attempt 2          Olivia Narvaez RN

## 2019-02-13 ENCOUNTER — CALL CENTER PROGRAMS (OUTPATIENT)
Dept: CALL CENTER | Facility: HOSPITAL | Age: 64
End: 2019-02-13

## 2019-02-13 NOTE — OUTREACH NOTE
Stroke Prateek Survey      Responses   Facility patient discharged from?  [facility]   Attempt successful  No   Unsuccessful attempts  Attempt 3   Call Center Gary Score  7          Olivia Narvaez RN

## 2021-01-01 ENCOUNTER — APPOINTMENT (OUTPATIENT)
Dept: GENERAL RADIOLOGY | Age: 66
DRG: 273 | End: 2021-01-01
Attending: INTERNAL MEDICINE
Payer: MEDICARE

## 2021-01-01 ENCOUNTER — APPOINTMENT (OUTPATIENT)
Dept: CT IMAGING | Age: 66
DRG: 273 | End: 2021-01-01
Attending: INTERNAL MEDICINE
Payer: MEDICARE

## 2021-01-01 ENCOUNTER — ANESTHESIA (OUTPATIENT)
Dept: CARDIAC CATH/INVASIVE PROCEDURES | Age: 66
DRG: 273 | End: 2021-01-01
Payer: MEDICARE

## 2021-01-01 ENCOUNTER — TELEPHONE (OUTPATIENT)
Dept: CARDIAC SURGERY | Facility: CLINIC | Age: 66
End: 2021-01-01

## 2021-01-01 ENCOUNTER — ANESTHESIA EVENT (OUTPATIENT)
Dept: CARDIAC CATH/INVASIVE PROCEDURES | Age: 66
DRG: 273 | End: 2021-01-01
Payer: MEDICARE

## 2021-01-01 ENCOUNTER — APPOINTMENT (OUTPATIENT)
Dept: MRI IMAGING | Age: 66
DRG: 273 | End: 2021-01-01
Attending: INTERNAL MEDICINE
Payer: MEDICARE

## 2021-01-01 ENCOUNTER — HOSPITAL ENCOUNTER (INPATIENT)
Age: 66
LOS: 13 days | Discharge: HOSPICE | DRG: 273 | End: 2021-06-11
Attending: INTERNAL MEDICINE | Admitting: INTERNAL MEDICINE
Payer: MEDICARE

## 2021-01-01 VITALS
RESPIRATION RATE: 12 BRPM | OXYGEN SATURATION: 96 % | BODY MASS INDEX: 37.04 KG/M2 | TEMPERATURE: 97 F | HEART RATE: 121 BPM | DIASTOLIC BLOOD PRESSURE: 94 MMHG | WEIGHT: 222.3 LBS | HEIGHT: 65 IN | SYSTOLIC BLOOD PRESSURE: 187 MMHG

## 2021-01-01 VITALS
TEMPERATURE: 96.4 F | RESPIRATION RATE: 14 BRPM | DIASTOLIC BLOOD PRESSURE: 88 MMHG | SYSTOLIC BLOOD PRESSURE: 154 MMHG | OXYGEN SATURATION: 100 %

## 2021-01-01 DIAGNOSIS — Z87.74 S/P PATENT FORAMEN OVALE CLOSURE: Primary | ICD-10-CM

## 2021-01-01 DIAGNOSIS — I51.0 CARDIAC SEPTAL DEFECT, ACQUIRED: ICD-10-CM

## 2021-01-01 DIAGNOSIS — R94.31 ABNORMAL ELECTROCARDIOGRAM (ECG) (EKG): ICD-10-CM

## 2021-01-01 LAB
ABO/RH: NORMAL
ALBUMIN SERPL-MCNC: 2.9 G/DL (ref 3.5–5.2)
ALBUMIN SERPL-MCNC: 3.2 G/DL (ref 3.5–5.2)
ALBUMIN SERPL-MCNC: 3.2 G/DL (ref 3.5–5.2)
ALBUMIN SERPL-MCNC: 3.6 G/DL (ref 3.5–5.2)
ALP BLD-CCNC: 110 U/L (ref 35–104)
ALP BLD-CCNC: 120 U/L (ref 35–104)
ALP BLD-CCNC: 135 U/L (ref 35–104)
ALP BLD-CCNC: 137 U/L (ref 35–104)
ALT SERPL-CCNC: 10 U/L (ref 5–33)
ALT SERPL-CCNC: 6 U/L (ref 5–33)
AMMONIA: 33 UMOL/L (ref 11–51)
ANION GAP SERPL CALCULATED.3IONS-SCNC: 10 MMOL/L (ref 7–19)
ANION GAP SERPL CALCULATED.3IONS-SCNC: 11 MMOL/L (ref 7–19)
ANION GAP SERPL CALCULATED.3IONS-SCNC: 12 MMOL/L (ref 7–19)
ANION GAP SERPL CALCULATED.3IONS-SCNC: 13 MMOL/L (ref 7–19)
ANION GAP SERPL CALCULATED.3IONS-SCNC: 13 MMOL/L (ref 7–19)
ANION GAP SERPL CALCULATED.3IONS-SCNC: 14 MMOL/L (ref 7–19)
ANION GAP SERPL CALCULATED.3IONS-SCNC: 16 MMOL/L (ref 7–19)
ANION GAP SERPL CALCULATED.3IONS-SCNC: 8 MMOL/L (ref 7–19)
ANION GAP SERPL CALCULATED.3IONS-SCNC: 9 MMOL/L (ref 7–19)
ANTIBODY SCREEN: NORMAL
APPEARANCE CSF: CLEAR
APTT: 30.1 SEC (ref 26–36.2)
AST SERPL-CCNC: 10 U/L (ref 5–32)
AST SERPL-CCNC: 14 U/L (ref 5–32)
AST SERPL-CCNC: 15 U/L (ref 5–32)
AST SERPL-CCNC: 26 U/L (ref 5–32)
ATYPICAL LYMPHOCYTE RELATIVE PERCENT: 1 % (ref 0–8)
ATYPICAL LYMPHOCYTE RELATIVE PERCENT: 15 % (ref 0–8)
ATYPICAL LYMPHOCYTE RELATIVE PERCENT: 3 % (ref 0–8)
ATYPICAL LYMPHOCYTE RELATIVE PERCENT: 4 % (ref 0–8)
BACTERIA: NEGATIVE /HPF
BACTERIA: NEGATIVE /HPF
BASE EXCESS ARTERIAL: -2.2 MMOL/L (ref -2–2)
BASOPHILS ABSOLUTE: 0 K/UL (ref 0–0.2)
BASOPHILS ABSOLUTE: 0 K/UL (ref 0–0.2)
BASOPHILS ABSOLUTE: 0.1 K/UL (ref 0–0.2)
BASOPHILS ABSOLUTE: 0.2 K/UL (ref 0–0.2)
BASOPHILS RELATIVE PERCENT: 0 % (ref 0–1)
BASOPHILS RELATIVE PERCENT: 0 % (ref 0–1)
BASOPHILS RELATIVE PERCENT: 0.5 % (ref 0–1)
BASOPHILS RELATIVE PERCENT: 0.9 % (ref 0–1)
BASOPHILS RELATIVE PERCENT: 1 % (ref 0–1)
BASOPHILS RELATIVE PERCENT: 1 % (ref 0–1)
BILIRUB SERPL-MCNC: 0.3 MG/DL (ref 0.2–1.2)
BILIRUB SERPL-MCNC: 0.3 MG/DL (ref 0.2–1.2)
BILIRUB SERPL-MCNC: 0.5 MG/DL (ref 0.2–1.2)
BILIRUB SERPL-MCNC: 0.5 MG/DL (ref 0.2–1.2)
BILIRUBIN URINE: NEGATIVE
BILIRUBIN URINE: NEGATIVE
BLOOD, URINE: ABNORMAL
BLOOD, URINE: ABNORMAL
BUN BLDV-MCNC: 10 MG/DL (ref 8–23)
BUN BLDV-MCNC: 11 MG/DL (ref 8–23)
BUN BLDV-MCNC: 13 MG/DL (ref 8–23)
BUN BLDV-MCNC: 13 MG/DL (ref 8–23)
BUN BLDV-MCNC: 14 MG/DL (ref 8–23)
BUN BLDV-MCNC: 16 MG/DL (ref 8–23)
BUN BLDV-MCNC: 17 MG/DL (ref 8–23)
BUN BLDV-MCNC: 20 MG/DL (ref 8–23)
BUN BLDV-MCNC: 20 MG/DL (ref 8–23)
BUN BLDV-MCNC: 21 MG/DL (ref 8–23)
C-REACTIVE PROTEIN: 0.71 MG/DL (ref 0–0.5)
CALCIUM SERPL-MCNC: 8.4 MG/DL (ref 8.8–10.2)
CALCIUM SERPL-MCNC: 8.5 MG/DL (ref 8.8–10.2)
CALCIUM SERPL-MCNC: 8.6 MG/DL (ref 8.8–10.2)
CALCIUM SERPL-MCNC: 8.7 MG/DL (ref 8.8–10.2)
CALCIUM SERPL-MCNC: 8.8 MG/DL (ref 8.8–10.2)
CALCIUM SERPL-MCNC: 8.9 MG/DL (ref 8.8–10.2)
CALCIUM SERPL-MCNC: 9 MG/DL (ref 8.8–10.2)
CALCIUM SERPL-MCNC: 9.1 MG/DL (ref 8.8–10.2)
CALCIUM SERPL-MCNC: 9.2 MG/DL (ref 8.8–10.2)
CALCIUM SERPL-MCNC: 9.2 MG/DL (ref 8.8–10.2)
CALCIUM SERPL-MCNC: 9.3 MG/DL (ref 8.8–10.2)
CARBOXYHEMOGLOBIN ARTERIAL: 1.8 % (ref 0–5)
CHLORIDE BLD-SCNC: 102 MMOL/L (ref 98–111)
CHLORIDE BLD-SCNC: 103 MMOL/L (ref 98–111)
CHLORIDE BLD-SCNC: 104 MMOL/L (ref 98–111)
CHLORIDE BLD-SCNC: 105 MMOL/L (ref 98–111)
CHLORIDE BLD-SCNC: 106 MMOL/L (ref 98–111)
CHLORIDE BLD-SCNC: 106 MMOL/L (ref 98–111)
CHLORIDE BLD-SCNC: 107 MMOL/L (ref 98–111)
CHLORIDE BLD-SCNC: 107 MMOL/L (ref 98–111)
CHLORIDE BLD-SCNC: 108 MMOL/L (ref 98–111)
CHLORIDE BLD-SCNC: 109 MMOL/L (ref 98–111)
CHOLESTEROL, TOTAL: 194 MG/DL (ref 160–199)
CLARITY: CLEAR
CLARITY: CLEAR
CLOT EVALUATION CSF: ABNORMAL
CO2: 22 MMOL/L (ref 22–29)
CO2: 23 MMOL/L (ref 22–29)
CO2: 24 MMOL/L (ref 22–29)
CO2: 25 MMOL/L (ref 22–29)
CO2: 26 MMOL/L (ref 22–29)
CO2: 26 MMOL/L (ref 22–29)
COLOR CSF: COLORLESS
COLOR: YELLOW
COLOR: YELLOW
CREAT SERPL-MCNC: 0.8 MG/DL (ref 0.5–0.9)
CREAT SERPL-MCNC: 0.9 MG/DL (ref 0.5–0.9)
CREAT SERPL-MCNC: 1.1 MG/DL (ref 0.5–0.9)
CREAT SERPL-MCNC: 1.1 MG/DL (ref 0.5–0.9)
CREAT SERPL-MCNC: 1.3 MG/DL (ref 0.5–0.9)
CREAT SERPL-MCNC: 1.4 MG/DL (ref 0.5–0.9)
CREAT SERPL-MCNC: 1.5 MG/DL (ref 0.5–0.9)
CREAT SERPL-MCNC: 1.8 MG/DL (ref 0.5–0.9)
CREAT SERPL-MCNC: 1.9 MG/DL (ref 0.5–0.9)
CRYPTOCOCCUS NEOFORMANS/GATTII CSF BY PCR: NOT DETECTED
CRYSTALS, UA: ABNORMAL /HPF
CRYSTALS, UA: ABNORMAL /HPF
CYTOMEGALOVIRUS CSF BY PCR: NOT DETECTED
EKG P AXIS: 50 DEGREES
EKG P-R INTERVAL: 188 MS
EKG Q-T INTERVAL: 434 MS
EKG QRS DURATION: 98 MS
EKG QTC CALCULATION (BAZETT): 460 MS
EKG T AXIS: 39 DEGREES
ENTEROVIRUS CSF BY PCR: NOT DETECTED
EOSINOPHILS ABSOLUTE: 0.15 K/UL (ref 0–0.6)
EOSINOPHILS ABSOLUTE: 0.2 K/UL (ref 0–0.6)
EOSINOPHILS ABSOLUTE: 0.31 K/UL (ref 0–0.6)
EOSINOPHILS ABSOLUTE: 0.4 K/UL (ref 0–0.6)
EOSINOPHILS ABSOLUTE: 0.81 K/UL (ref 0–0.6)
EOSINOPHILS ABSOLUTE: 1.13 K/UL (ref 0–0.6)
EOSINOPHILS RELATIVE PERCENT: 1 % (ref 0–5)
EOSINOPHILS RELATIVE PERCENT: 1.8 % (ref 0–5)
EOSINOPHILS RELATIVE PERCENT: 2 % (ref 0–5)
EOSINOPHILS RELATIVE PERCENT: 3.1 % (ref 0–5)
EOSINOPHILS RELATIVE PERCENT: 5 % (ref 0–5)
EOSINOPHILS RELATIVE PERCENT: 9 % (ref 0–5)
EPITHELIAL CELLS, UA: 1 /HPF (ref 0–5)
EPITHELIAL CELLS, UA: 2 /HPF (ref 0–5)
ESCHERICHIA COLI K1 CSF BY PCR: NOT DETECTED
GFR AFRICAN AMERICAN: 32
GFR AFRICAN AMERICAN: 34
GFR AFRICAN AMERICAN: 42
GFR AFRICAN AMERICAN: 46
GFR AFRICAN AMERICAN: 50
GFR AFRICAN AMERICAN: >59
GFR NON-AFRICAN AMERICAN: 27
GFR NON-AFRICAN AMERICAN: 28
GFR NON-AFRICAN AMERICAN: 35
GFR NON-AFRICAN AMERICAN: 38
GFR NON-AFRICAN AMERICAN: 41
GFR NON-AFRICAN AMERICAN: 50
GFR NON-AFRICAN AMERICAN: 50
GFR NON-AFRICAN AMERICAN: >60
GLUCOSE BLD-MCNC: 100 MG/DL (ref 74–109)
GLUCOSE BLD-MCNC: 100 MG/DL (ref 74–109)
GLUCOSE BLD-MCNC: 101 MG/DL (ref 70–99)
GLUCOSE BLD-MCNC: 103 MG/DL (ref 70–99)
GLUCOSE BLD-MCNC: 104 MG/DL (ref 70–99)
GLUCOSE BLD-MCNC: 104 MG/DL (ref 70–99)
GLUCOSE BLD-MCNC: 105 MG/DL (ref 70–99)
GLUCOSE BLD-MCNC: 105 MG/DL (ref 70–99)
GLUCOSE BLD-MCNC: 108 MG/DL (ref 70–99)
GLUCOSE BLD-MCNC: 108 MG/DL (ref 70–99)
GLUCOSE BLD-MCNC: 108 MG/DL (ref 74–109)
GLUCOSE BLD-MCNC: 110 MG/DL (ref 70–99)
GLUCOSE BLD-MCNC: 117 MG/DL (ref 70–99)
GLUCOSE BLD-MCNC: 120 MG/DL (ref 70–99)
GLUCOSE BLD-MCNC: 124 MG/DL (ref 74–109)
GLUCOSE BLD-MCNC: 126 MG/DL (ref 74–109)
GLUCOSE BLD-MCNC: 127 MG/DL (ref 70–99)
GLUCOSE BLD-MCNC: 129 MG/DL (ref 74–109)
GLUCOSE BLD-MCNC: 137 MG/DL (ref 70–99)
GLUCOSE BLD-MCNC: 137 MG/DL (ref 74–109)
GLUCOSE BLD-MCNC: 139 MG/DL (ref 70–99)
GLUCOSE BLD-MCNC: 140 MG/DL (ref 70–99)
GLUCOSE BLD-MCNC: 141 MG/DL (ref 70–99)
GLUCOSE BLD-MCNC: 143 MG/DL (ref 74–109)
GLUCOSE BLD-MCNC: 145 MG/DL (ref 70–99)
GLUCOSE BLD-MCNC: 145 MG/DL (ref 74–109)
GLUCOSE BLD-MCNC: 147 MG/DL (ref 70–99)
GLUCOSE BLD-MCNC: 151 MG/DL (ref 74–109)
GLUCOSE BLD-MCNC: 152 MG/DL (ref 70–99)
GLUCOSE BLD-MCNC: 153 MG/DL (ref 70–99)
GLUCOSE BLD-MCNC: 153 MG/DL (ref 70–99)
GLUCOSE BLD-MCNC: 156 MG/DL (ref 70–99)
GLUCOSE BLD-MCNC: 158 MG/DL (ref 70–99)
GLUCOSE BLD-MCNC: 158 MG/DL (ref 70–99)
GLUCOSE BLD-MCNC: 165 MG/DL (ref 74–109)
GLUCOSE BLD-MCNC: 170 MG/DL (ref 70–99)
GLUCOSE BLD-MCNC: 176 MG/DL (ref 70–99)
GLUCOSE BLD-MCNC: 179 MG/DL (ref 70–99)
GLUCOSE BLD-MCNC: 179 MG/DL (ref 74–109)
GLUCOSE BLD-MCNC: 182 MG/DL (ref 74–109)
GLUCOSE BLD-MCNC: 187 MG/DL (ref 70–99)
GLUCOSE BLD-MCNC: 195 MG/DL (ref 70–99)
GLUCOSE BLD-MCNC: 202 MG/DL (ref 70–99)
GLUCOSE BLD-MCNC: 204 MG/DL (ref 70–99)
GLUCOSE BLD-MCNC: 206 MG/DL (ref 70–99)
GLUCOSE BLD-MCNC: 207 MG/DL (ref 70–99)
GLUCOSE BLD-MCNC: 216 MG/DL (ref 70–99)
GLUCOSE BLD-MCNC: 219 MG/DL (ref 70–99)
GLUCOSE BLD-MCNC: 222 MG/DL (ref 70–99)
GLUCOSE BLD-MCNC: 82 MG/DL (ref 70–99)
GLUCOSE BLD-MCNC: 86 MG/DL (ref 70–99)
GLUCOSE BLD-MCNC: 88 MG/DL (ref 70–99)
GLUCOSE BLD-MCNC: 92 MG/DL (ref 70–99)
GLUCOSE BLD-MCNC: 93 MG/DL (ref 70–99)
GLUCOSE BLD-MCNC: 93 MG/DL (ref 70–99)
GLUCOSE BLD-MCNC: 94 MG/DL (ref 74–109)
GLUCOSE BLD-MCNC: 95 MG/DL (ref 74–109)
GLUCOSE BLD-MCNC: 97 MG/DL (ref 70–99)
GLUCOSE BLD-MCNC: 97 MG/DL (ref 70–99)
GLUCOSE BLD-MCNC: 98 MG/DL (ref 70–99)
GLUCOSE BLD-MCNC: 99 MG/DL (ref 70–99)
GLUCOSE URINE: 100 MG/DL
GLUCOSE URINE: 250 MG/DL
GLUCOSE, CSF: 65 MG/DL (ref 40–70)
HAEMOPHILUS INFLUENZAE CSF BY PCR: NOT DETECTED
HBA1C MFR BLD: 9.1 % (ref 4–6)
HCO3 ARTERIAL: 23.2 MMOL/L (ref 22–26)
HCT VFR BLD CALC: 31.4 % (ref 37–47)
HCT VFR BLD CALC: 31.7 % (ref 37–47)
HCT VFR BLD CALC: 32 % (ref 37–47)
HCT VFR BLD CALC: 32 % (ref 37–47)
HCT VFR BLD CALC: 34.3 % (ref 37–47)
HCT VFR BLD CALC: 34.3 % (ref 37–47)
HCT VFR BLD CALC: 34.5 % (ref 37–47)
HCT VFR BLD CALC: 36 % (ref 37–47)
HCT VFR BLD CALC: 38 % (ref 37–47)
HCT VFR BLD CALC: 40.6 % (ref 37–47)
HCT VFR BLD CALC: 41.5 % (ref 37–47)
HCT VFR BLD CALC: 42.1 % (ref 37–47)
HCT VFR BLD CALC: 42.2 % (ref 37–47)
HCT VFR BLD CALC: 42.2 % (ref 37–47)
HCT VFR BLD CALC: 43.3 % (ref 37–47)
HCT VFR BLD CALC: 43.9 % (ref 37–47)
HCT VFR BLD CALC: 44.4 % (ref 37–47)
HCT VFR BLD CALC: 48.2 % (ref 37–47)
HDLC SERPL-MCNC: 32 MG/DL (ref 65–121)
HEMOGLOBIN, ART, EXTENDED: 13.3 G/DL (ref 12–16)
HEMOGLOBIN: 10.4 G/DL (ref 12–16)
HEMOGLOBIN: 10.5 G/DL (ref 12–16)
HEMOGLOBIN: 10.5 G/DL (ref 12–16)
HEMOGLOBIN: 10.7 G/DL (ref 12–16)
HEMOGLOBIN: 11.1 G/DL (ref 12–16)
HEMOGLOBIN: 11.2 G/DL (ref 12–16)
HEMOGLOBIN: 11.3 G/DL (ref 12–16)
HEMOGLOBIN: 12 G/DL (ref 12–16)
HEMOGLOBIN: 12.8 G/DL (ref 12–16)
HEMOGLOBIN: 13 G/DL (ref 12–16)
HEMOGLOBIN: 13.6 G/DL (ref 12–16)
HEMOGLOBIN: 14.1 G/DL (ref 12–16)
HEMOGLOBIN: 14.3 G/DL (ref 12–16)
HEMOGLOBIN: 14.6 G/DL (ref 12–16)
HEMOGLOBIN: 14.6 G/DL (ref 12–16)
HERPES SIMPLEX VIRUS 1 CSF BY PCR: NOT DETECTED
HERPES SIMPLEX VIRUS 2 CSF BY PCR: NOT DETECTED
HUMAN HERPESVIRUS 6 CSF BY PCR: NOT DETECTED
HUMAN PARECHOVIRUS CSF BY PCR: NOT DETECTED
HYALINE CASTS: 3 /HPF (ref 0–8)
HYALINE CASTS: 4 /HPF (ref 0–8)
HYPOCHROMIA: ABNORMAL
IMMATURE GRANULOCYTES #: 0 K/UL
IMMATURE GRANULOCYTES #: 0.1 K/UL
INR BLD: 1.05 (ref 0.88–1.18)
INR BLD: 1.06 (ref 0.88–1.18)
INR BLD: 1.14 (ref 0.88–1.18)
KETONES, URINE: ABNORMAL MG/DL
KETONES, URINE: ABNORMAL MG/DL
LACTIC ACID: 1.8 MMOL/L (ref 0.5–1.9)
LDL CHOLESTEROL CALCULATED: 106 MG/DL
LEUKOCYTE ESTERASE, URINE: NEGATIVE
LEUKOCYTE ESTERASE, URINE: NEGATIVE
LISTERIA MONOCYTOGENES CSF BY PCR: NOT DETECTED
LV EF: 58 %
LV EF: 65 %
LVEF MODALITY: NORMAL
LVEF MODALITY: NORMAL
LYMPHOCYTES ABSOLUTE: 12 K/UL (ref 1.1–4.5)
LYMPHOCYTES ABSOLUTE: 2.7 K/UL (ref 1.1–4.5)
LYMPHOCYTES ABSOLUTE: 3.3 K/UL (ref 1.1–4.5)
LYMPHOCYTES ABSOLUTE: 6.9 K/UL (ref 1.1–4.5)
LYMPHOCYTES ABSOLUTE: 7.9 K/UL (ref 1.1–4.5)
LYMPHOCYTES ABSOLUTE: 8.5 K/UL (ref 1.1–4.5)
LYMPHOCYTES RELATIVE PERCENT: 22.4 % (ref 20–40)
LYMPHOCYTES RELATIVE PERCENT: 24.6 % (ref 20–40)
LYMPHOCYTES RELATIVE PERCENT: 42 % (ref 20–40)
LYMPHOCYTES RELATIVE PERCENT: 50 % (ref 20–40)
LYMPHOCYTES RELATIVE PERCENT: 60 % (ref 20–40)
LYMPHOCYTES RELATIVE PERCENT: 67 % (ref 20–40)
MACROCYTES: ABNORMAL
MACROCYTES: ABNORMAL
MAGNESIUM: 1.4 MG/DL (ref 1.6–2.4)
MAGNESIUM: 1.4 MG/DL (ref 1.6–2.4)
MAGNESIUM: 1.5 MG/DL (ref 1.6–2.4)
MAGNESIUM: 1.6 MG/DL (ref 1.6–2.4)
MAGNESIUM: 1.8 MG/DL (ref 1.6–2.4)
MCH RBC QN AUTO: 30.8 PG (ref 27–31)
MCH RBC QN AUTO: 31 PG (ref 27–31)
MCH RBC QN AUTO: 31 PG (ref 27–31)
MCH RBC QN AUTO: 31.1 PG (ref 27–31)
MCH RBC QN AUTO: 31.1 PG (ref 27–31)
MCH RBC QN AUTO: 31.2 PG (ref 27–31)
MCH RBC QN AUTO: 31.4 PG (ref 27–31)
MCH RBC QN AUTO: 31.4 PG (ref 27–31)
MCH RBC QN AUTO: 31.5 PG (ref 27–31)
MCH RBC QN AUTO: 31.5 PG (ref 27–31)
MCH RBC QN AUTO: 31.6 PG (ref 27–31)
MCH RBC QN AUTO: 31.6 PG (ref 27–31)
MCH RBC QN AUTO: 31.7 PG (ref 27–31)
MCH RBC QN AUTO: 31.7 PG (ref 27–31)
MCH RBC QN AUTO: 31.9 PG (ref 27–31)
MCH RBC QN AUTO: 32 PG (ref 27–31)
MCH RBC QN AUTO: 32.2 PG (ref 27–31)
MCHC RBC AUTO-ENTMCNC: 30.3 G/DL (ref 33–37)
MCHC RBC AUTO-ENTMCNC: 32 G/DL (ref 33–37)
MCHC RBC AUTO-ENTMCNC: 32.2 G/DL (ref 33–37)
MCHC RBC AUTO-ENTMCNC: 32.3 G/DL (ref 33–37)
MCHC RBC AUTO-ENTMCNC: 32.4 G/DL (ref 33–37)
MCHC RBC AUTO-ENTMCNC: 32.6 G/DL (ref 33–37)
MCHC RBC AUTO-ENTMCNC: 32.6 G/DL (ref 33–37)
MCHC RBC AUTO-ENTMCNC: 32.7 G/DL (ref 33–37)
MCHC RBC AUTO-ENTMCNC: 32.8 G/DL (ref 33–37)
MCHC RBC AUTO-ENTMCNC: 32.9 G/DL (ref 33–37)
MCHC RBC AUTO-ENTMCNC: 33.3 G/DL (ref 33–37)
MCHC RBC AUTO-ENTMCNC: 33.4 G/DL (ref 33–37)
MCHC RBC AUTO-ENTMCNC: 33.4 G/DL (ref 33–37)
MCHC RBC AUTO-ENTMCNC: 33.7 G/DL (ref 33–37)
MCV RBC AUTO: 103.9 FL (ref 81–99)
MCV RBC AUTO: 94.2 FL (ref 81–99)
MCV RBC AUTO: 94.4 FL (ref 81–99)
MCV RBC AUTO: 94.8 FL (ref 81–99)
MCV RBC AUTO: 95 FL (ref 81–99)
MCV RBC AUTO: 95.6 FL (ref 81–99)
MCV RBC AUTO: 95.8 FL (ref 81–99)
MCV RBC AUTO: 96.1 FL (ref 81–99)
MCV RBC AUTO: 96.2 FL (ref 81–99)
MCV RBC AUTO: 96.3 FL (ref 81–99)
MCV RBC AUTO: 96.4 FL (ref 81–99)
MCV RBC AUTO: 96.6 FL (ref 81–99)
MCV RBC AUTO: 96.7 FL (ref 81–99)
MCV RBC AUTO: 96.8 FL (ref 81–99)
MCV RBC AUTO: 97.4 FL (ref 81–99)
METHEMOGLOBIN ARTERIAL: 1.7 %
MONOCYTES ABSOLUTE: 0.1 K/UL (ref 0–0.9)
MONOCYTES ABSOLUTE: 0.3 K/UL (ref 0–0.9)
MONOCYTES ABSOLUTE: 0.7 K/UL (ref 0–0.9)
MONOCYTES ABSOLUTE: 1.1 K/UL (ref 0–0.9)
MONOCYTES ABSOLUTE: 1.5 K/UL (ref 0–0.9)
MONOCYTES ABSOLUTE: 1.6 K/UL (ref 0–0.9)
MONOCYTES RELATIVE PERCENT: 1 % (ref 0–10)
MONOCYTES RELATIVE PERCENT: 11.1 % (ref 0–10)
MONOCYTES RELATIVE PERCENT: 12.8 % (ref 0–10)
MONOCYTES RELATIVE PERCENT: 2 % (ref 0–10)
MONOCYTES RELATIVE PERCENT: 5 % (ref 0–10)
MONOCYTES RELATIVE PERCENT: 7 % (ref 0–10)
MONONUCLEAR UNIDENTIFIED CELLS: 1 %
MYELOCYTE PERCENT: 1 %
NEISSERIA MENINGITIDIS CSF BY PCR: NOT DETECTED
NEUTROPHILS ABSOLUTE: 1.8 K/UL (ref 1.5–7.5)
NEUTROPHILS ABSOLUTE: 3.1 K/UL (ref 1.5–7.5)
NEUTROPHILS ABSOLUTE: 5.8 K/UL (ref 1.5–7.5)
NEUTROPHILS ABSOLUTE: 7.5 K/UL (ref 1.5–7.5)
NEUTROPHILS ABSOLUTE: 7.9 K/UL (ref 1.5–7.5)
NEUTROPHILS ABSOLUTE: 8.1 K/UL (ref 1.5–7.5)
NEUTROPHILS RELATIVE PERCENT: 12 % (ref 50–65)
NEUTROPHILS RELATIVE PERCENT: 24 % (ref 50–65)
NEUTROPHILS RELATIVE PERCENT: 37 % (ref 50–65)
NEUTROPHILS RELATIVE PERCENT: 49 % (ref 50–65)
NEUTROPHILS RELATIVE PERCENT: 60.2 % (ref 50–65)
NEUTROPHILS RELATIVE PERCENT: 61.4 % (ref 50–65)
NITRITE, URINE: NEGATIVE
NITRITE, URINE: NEGATIVE
NO DIFFERENTIAL CSF: ABNORMAL
O2 CONTENT ARTERIAL: 18.3 ML/DL
O2 SAT, ARTERIAL: 96.4 %
O2 THERAPY: ABNORMAL
OVALOCYTES: ABNORMAL
OVALOCYTES: ABNORMAL
P2Y12 RESULT: 214 PRU (ref 194–418)
PCO2 ARTERIAL: 41 MMHG (ref 35–45)
PDW BLD-RTO: 13.3 % (ref 11.5–14.5)
PDW BLD-RTO: 13.4 % (ref 11.5–14.5)
PDW BLD-RTO: 13.5 % (ref 11.5–14.5)
PDW BLD-RTO: 13.6 % (ref 11.5–14.5)
PDW BLD-RTO: 13.7 % (ref 11.5–14.5)
PDW BLD-RTO: 13.7 % (ref 11.5–14.5)
PDW BLD-RTO: 13.8 % (ref 11.5–14.5)
PDW BLD-RTO: 13.9 % (ref 11.5–14.5)
PDW BLD-RTO: 14 % (ref 11.5–14.5)
PDW BLD-RTO: 14 % (ref 11.5–14.5)
PDW BLD-RTO: 14.1 % (ref 11.5–14.5)
PERFORMED ON: ABNORMAL
PERFORMED ON: NORMAL
PH ARTERIAL: 7.36 (ref 7.35–7.45)
PH UA: 5.5 (ref 5–8)
PH UA: 6 (ref 5–8)
PHENYTOIN LEVEL: 11 UG/ML (ref 10–20)
PHENYTOIN LEVEL: 5.2 UG/ML (ref 10–20)
PHENYTOIN LEVEL: 9.9 UG/ML (ref 10–20)
PLATELET # BLD: 210 K/UL (ref 130–400)
PLATELET # BLD: 232 K/UL (ref 130–400)
PLATELET # BLD: 236 K/UL (ref 130–400)
PLATELET # BLD: 237 K/UL (ref 130–400)
PLATELET # BLD: 248 K/UL (ref 130–400)
PLATELET # BLD: 252 K/UL (ref 130–400)
PLATELET # BLD: 266 K/UL (ref 130–400)
PLATELET # BLD: 283 K/UL (ref 130–400)
PLATELET # BLD: 326 K/UL (ref 130–400)
PLATELET # BLD: 333 K/UL (ref 130–400)
PLATELET # BLD: 345 K/UL (ref 130–400)
PLATELET # BLD: 356 K/UL (ref 130–400)
PLATELET # BLD: 369 K/UL (ref 130–400)
PLATELET # BLD: 374 K/UL (ref 130–400)
PLATELET # BLD: 376 K/UL (ref 130–400)
PLATELET # BLD: 382 K/UL (ref 130–400)
PLATELET # BLD: 384 K/UL (ref 130–400)
PLATELET # BLD: 388 K/UL (ref 130–400)
PLATELET SLIDE REVIEW: ABNORMAL
PLATELET SLIDE REVIEW: ADEQUATE
PMV BLD AUTO: 10.3 FL (ref 9.4–12.3)
PMV BLD AUTO: 10.4 FL (ref 9.4–12.3)
PMV BLD AUTO: 10.5 FL (ref 9.4–12.3)
PMV BLD AUTO: 10.6 FL (ref 9.4–12.3)
PMV BLD AUTO: 10.6 FL (ref 9.4–12.3)
PMV BLD AUTO: 10.7 FL (ref 9.4–12.3)
PMV BLD AUTO: 10.7 FL (ref 9.4–12.3)
PMV BLD AUTO: 10.8 FL (ref 9.4–12.3)
PMV BLD AUTO: 10.9 FL (ref 9.4–12.3)
PMV BLD AUTO: 10.9 FL (ref 9.4–12.3)
PMV BLD AUTO: 11 FL (ref 9.4–12.3)
PMV BLD AUTO: 11.1 FL (ref 9.4–12.3)
PMV BLD AUTO: 11.4 FL (ref 9.4–12.3)
PMV BLD AUTO: 11.5 FL (ref 9.4–12.3)
PMV BLD AUTO: 11.7 FL (ref 9.4–12.3)
PO2 ARTERIAL: 141 MMHG (ref 80–100)
POC ACT LR: 277 SEC
POLYCHROMASIA: ABNORMAL
POLYCHROMASIA: ABNORMAL
POTASSIUM REFLEX MAGNESIUM: 3 MMOL/L (ref 3.5–5)
POTASSIUM REFLEX MAGNESIUM: 3.2 MMOL/L (ref 3.5–5)
POTASSIUM REFLEX MAGNESIUM: 3.3 MMOL/L (ref 3.5–5)
POTASSIUM REFLEX MAGNESIUM: 3.4 MMOL/L (ref 3.5–5)
POTASSIUM REFLEX MAGNESIUM: 3.5 MMOL/L (ref 3.5–5)
POTASSIUM REFLEX MAGNESIUM: 3.6 MMOL/L (ref 3.5–5)
POTASSIUM REFLEX MAGNESIUM: 3.7 MMOL/L (ref 3.5–5)
POTASSIUM REFLEX MAGNESIUM: 3.9 MMOL/L (ref 3.5–5)
POTASSIUM REFLEX MAGNESIUM: 4 MMOL/L (ref 3.5–5)
POTASSIUM REFLEX MAGNESIUM: 4.1 MMOL/L (ref 3.5–5)
POTASSIUM REFLEX MAGNESIUM: 4.4 MMOL/L (ref 3.5–5)
POTASSIUM REFLEX MAGNESIUM: 4.4 MMOL/L (ref 3.5–5)
POTASSIUM SERPL-SCNC: 4.2 MMOL/L (ref 3.5–5)
POTASSIUM, WHOLE BLOOD: 4.1
PRO-BNP: 659 PG/ML (ref 0–900)
PROCALCITONIN: 0.12 NG/ML (ref 0–0.09)
PROTEIN CSF: 45 MG/DL (ref 15–45)
PROTEIN UA: ABNORMAL MG/DL
PROTEIN UA: ABNORMAL MG/DL
PROTHROMBIN TIME: 13.6 SEC (ref 12–14.6)
PROTHROMBIN TIME: 13.8 SEC (ref 12–14.6)
PROTHROMBIN TIME: 14.6 SEC (ref 12–14.6)
RBC # BLD: 3.26 M/UL (ref 4.2–5.4)
RBC # BLD: 3.28 M/UL (ref 4.2–5.4)
RBC # BLD: 3.31 M/UL (ref 4.2–5.4)
RBC # BLD: 3.39 M/UL (ref 4.2–5.4)
RBC # BLD: 3.58 M/UL (ref 4.2–5.4)
RBC # BLD: 3.58 M/UL (ref 4.2–5.4)
RBC # BLD: 3.61 M/UL (ref 4.2–5.4)
RBC # BLD: 3.82 M/UL (ref 4.2–5.4)
RBC # BLD: 4.01 M/UL (ref 4.2–5.4)
RBC # BLD: 4.22 M/UL (ref 4.2–5.4)
RBC # BLD: 4.32 M/UL (ref 4.2–5.4)
RBC # BLD: 4.36 M/UL (ref 4.2–5.4)
RBC # BLD: 4.37 M/UL (ref 4.2–5.4)
RBC # BLD: 4.53 M/UL (ref 4.2–5.4)
RBC # BLD: 4.56 M/UL (ref 4.2–5.4)
RBC # BLD: 4.56 M/UL (ref 4.2–5.4)
RBC # BLD: 4.64 M/UL (ref 4.2–5.4)
RBC # BLD: NORMAL 10*6/UL
RBC # BLD: NORMAL 10*6/UL
RBC CSF: 21 /CUMM (ref 0–5)
RBC UA: 12 /HPF (ref 0–4)
RBC UA: 3 /HPF (ref 0–4)
REASON FOR REJECTION: NORMAL
REASON FOR REJECTION: NORMAL
REJECTED TEST: NORMAL
REJECTED TEST: NORMAL
SODIUM BLD-SCNC: 138 MMOL/L (ref 136–145)
SODIUM BLD-SCNC: 139 MMOL/L (ref 136–145)
SODIUM BLD-SCNC: 140 MMOL/L (ref 136–145)
SODIUM BLD-SCNC: 141 MMOL/L (ref 136–145)
SODIUM BLD-SCNC: 142 MMOL/L (ref 136–145)
SODIUM BLD-SCNC: 143 MMOL/L (ref 136–145)
SODIUM BLD-SCNC: 143 MMOL/L (ref 136–145)
SPECIFIC GRAVITY UA: 1.02 (ref 1–1.03)
SPECIFIC GRAVITY UA: 1.02 (ref 1–1.03)
STREPTOCOCCUS AGALACTIAE CSF BY PCR: NOT DETECTED
STREPTOCOCCUS PNEUMONIAE CSF BY PCR: NOT DETECTED
TOTAL PROTEIN: 6.3 G/DL (ref 6.6–8.7)
TOTAL PROTEIN: 6.5 G/DL (ref 6.6–8.7)
TOTAL PROTEIN: 7 G/DL (ref 6.6–8.7)
TOTAL PROTEIN: 7.7 G/DL (ref 6.6–8.7)
TRIGL SERPL-MCNC: 278 MG/DL (ref 0–149)
TSH SERPL DL<=0.05 MIU/L-ACNC: 0.73 UIU/ML (ref 0.27–4.2)
TUBE NUMBER CSF: ABNORMAL
UROBILINOGEN, URINE: 0.2 E.U./DL
UROBILINOGEN, URINE: 0.2 E.U./DL
VALPROIC ACID LEVEL: 104.1 UG/ML (ref 50–100)
VALPROIC ACID LEVEL: 66.6 UG/ML (ref 50–100)
VALPROIC ACID LEVEL: 67.5 UG/ML (ref 50–100)
VALPROIC ACID LEVEL: 82.2 UG/ML (ref 50–100)
VALPROIC ACID LEVEL: 82.4 UG/ML (ref 50–100)
VARICELLA ZOSTER VIRUS CSF BY PCR: NOT DETECTED
WBC # BLD: 12.2 K/UL (ref 4.8–10.8)
WBC # BLD: 12.5 K/UL (ref 4.8–10.8)
WBC # BLD: 12.9 K/UL (ref 4.8–10.8)
WBC # BLD: 13.4 K/UL (ref 4.8–10.8)
WBC # BLD: 13.6 K/UL (ref 4.8–10.8)
WBC # BLD: 14.3 K/UL (ref 4.8–10.8)
WBC # BLD: 14.6 K/UL (ref 4.8–10.8)
WBC # BLD: 15.7 K/UL (ref 4.8–10.8)
WBC # BLD: 16.1 K/UL (ref 4.8–10.8)
WBC # BLD: 16.2 K/UL (ref 4.8–10.8)
WBC # BLD: 16.5 K/UL (ref 4.8–10.8)
WBC # BLD: 17 K/UL (ref 4.8–10.8)
WBC # BLD: 17.8 K/UL (ref 4.8–10.8)
WBC # BLD: 21.5 K/UL (ref 4.8–10.8)
WBC # BLD: 21.6 K/UL (ref 4.8–10.8)
WBC # BLD: 21.9 K/UL (ref 4.8–10.8)
WBC # BLD: 24.5 K/UL (ref 4.8–10.8)
WBC CSF: 2 /CUMM (ref 0–8)
WBC UA: 1 /HPF (ref 0–5)
WBC UA: 3 /HPF (ref 0–5)

## 2021-01-01 PROCEDURE — 82803 BLOOD GASES ANY COMBINATION: CPT

## 2021-01-01 PROCEDURE — 2700000000 HC OXYGEN THERAPY PER DAY

## 2021-01-01 PROCEDURE — 84132 ASSAY OF SERUM POTASSIUM: CPT

## 2021-01-01 PROCEDURE — 96376 TX/PRO/DX INJ SAME DRUG ADON: CPT

## 2021-01-01 PROCEDURE — 97110 THERAPEUTIC EXERCISES: CPT

## 2021-01-01 PROCEDURE — 80048 BASIC METABOLIC PNL TOTAL CA: CPT

## 2021-01-01 PROCEDURE — 84443 ASSAY THYROID STIM HORMONE: CPT

## 2021-01-01 PROCEDURE — 2580000003 HC RX 258: Performed by: HOSPITALIST

## 2021-01-01 PROCEDURE — 6370000000 HC RX 637 (ALT 250 FOR IP): Performed by: INTERNAL MEDICINE

## 2021-01-01 PROCEDURE — 6360000002 HC RX W HCPCS

## 2021-01-01 PROCEDURE — 1210000000 HC MED SURG R&B

## 2021-01-01 PROCEDURE — 71045 X-RAY EXAM CHEST 1 VIEW: CPT

## 2021-01-01 PROCEDURE — 6360000002 HC RX W HCPCS: Performed by: PSYCHIATRY & NEUROLOGY

## 2021-01-01 PROCEDURE — 83735 ASSAY OF MAGNESIUM: CPT

## 2021-01-01 PROCEDURE — 85730 THROMBOPLASTIN TIME PARTIAL: CPT

## 2021-01-01 PROCEDURE — 6360000002 HC RX W HCPCS: Performed by: REGISTERED NURSE

## 2021-01-01 PROCEDURE — 97535 SELF CARE MNGMENT TRAINING: CPT

## 2021-01-01 PROCEDURE — 82140 ASSAY OF AMMONIA: CPT

## 2021-01-01 PROCEDURE — 96372 THER/PROPH/DIAG INJ SC/IM: CPT

## 2021-01-01 PROCEDURE — G0378 HOSPITAL OBSERVATION PER HR: HCPCS

## 2021-01-01 PROCEDURE — 6360000002 HC RX W HCPCS: Performed by: INTERNAL MEDICINE

## 2021-01-01 PROCEDURE — 85027 COMPLETE CBC AUTOMATED: CPT

## 2021-01-01 PROCEDURE — 2500000003 HC RX 250 WO HCPCS: Performed by: INTERNAL MEDICINE

## 2021-01-01 PROCEDURE — 36415 COLL VENOUS BLD VENIPUNCTURE: CPT

## 2021-01-01 PROCEDURE — 99232 SBSQ HOSP IP/OBS MODERATE 35: CPT | Performed by: PSYCHIATRY & NEUROLOGY

## 2021-01-01 PROCEDURE — 95714 VEEG EA 12-26 HR UNMNTR: CPT

## 2021-01-01 PROCEDURE — 99233 SBSQ HOSP IP/OBS HIGH 50: CPT | Performed by: INTERNAL MEDICINE

## 2021-01-01 PROCEDURE — 6370000000 HC RX 637 (ALT 250 FOR IP): Performed by: HOSPITALIST

## 2021-01-01 PROCEDURE — 36592 COLLECT BLOOD FROM PICC: CPT

## 2021-01-01 PROCEDURE — 97116 GAIT TRAINING THERAPY: CPT

## 2021-01-01 PROCEDURE — 99233 SBSQ HOSP IP/OBS HIGH 50: CPT | Performed by: PSYCHIATRY & NEUROLOGY

## 2021-01-01 PROCEDURE — 80164 ASSAY DIPROPYLACETIC ACD TOT: CPT

## 2021-01-01 PROCEDURE — 2580000003 HC RX 258: Performed by: NURSE PRACTITIONER

## 2021-01-01 PROCEDURE — 97162 PT EVAL MOD COMPLEX 30 MIN: CPT

## 2021-01-01 PROCEDURE — 85049 AUTOMATED PLATELET COUNT: CPT

## 2021-01-01 PROCEDURE — 2000000000 HC ICU R&B

## 2021-01-01 PROCEDURE — 70450 CT HEAD/BRAIN W/O DYE: CPT

## 2021-01-01 PROCEDURE — 6360000002 HC RX W HCPCS: Performed by: HOSPITALIST

## 2021-01-01 PROCEDURE — 2500000003 HC RX 250 WO HCPCS: Performed by: PSYCHIATRY & NEUROLOGY

## 2021-01-01 PROCEDURE — 82945 GLUCOSE OTHER FLUID: CPT

## 2021-01-01 PROCEDURE — 009U3ZX DRAINAGE OF SPINAL CANAL, PERCUTANEOUS APPROACH, DIAGNOSTIC: ICD-10-PCS | Performed by: INTERNAL MEDICINE

## 2021-01-01 PROCEDURE — 84145 PROCALCITONIN (PCT): CPT

## 2021-01-01 PROCEDURE — 2580000003 HC RX 258: Performed by: INTERNAL MEDICINE

## 2021-01-01 PROCEDURE — 80185 ASSAY OF PHENYTOIN TOTAL: CPT

## 2021-01-01 PROCEDURE — 97530 THERAPEUTIC ACTIVITIES: CPT

## 2021-01-01 PROCEDURE — 81001 URINALYSIS AUTO W/SCOPE: CPT

## 2021-01-01 PROCEDURE — G0379 DIRECT REFER HOSPITAL OBSERV: HCPCS

## 2021-01-01 PROCEDURE — 6370000000 HC RX 637 (ALT 250 FOR IP): Performed by: NURSE PRACTITIONER

## 2021-01-01 PROCEDURE — 74018 RADEX ABDOMEN 1 VIEW: CPT

## 2021-01-01 PROCEDURE — C9254 INJECTION, LACOSAMIDE: HCPCS | Performed by: PSYCHIATRY & NEUROLOGY

## 2021-01-01 PROCEDURE — 83036 HEMOGLOBIN GLYCOSYLATED A1C: CPT

## 2021-01-01 PROCEDURE — 2140000000 HC CCU INTERMEDIATE R&B

## 2021-01-01 PROCEDURE — 87483 CNS DNA AMP PROBE TYPE 12-25: CPT

## 2021-01-01 PROCEDURE — 96366 THER/PROPH/DIAG IV INF ADDON: CPT

## 2021-01-01 PROCEDURE — 95819 EEG AWAKE AND ASLEEP: CPT

## 2021-01-01 PROCEDURE — 96367 TX/PROPH/DG ADDL SEQ IV INF: CPT

## 2021-01-01 PROCEDURE — 84157 ASSAY OF PROTEIN OTHER: CPT

## 2021-01-01 PROCEDURE — 70551 MRI BRAIN STEM W/O DYE: CPT

## 2021-01-01 PROCEDURE — 99223 1ST HOSP IP/OBS HIGH 75: CPT | Performed by: PSYCHIATRY & NEUROLOGY

## 2021-01-01 PROCEDURE — 83605 ASSAY OF LACTIC ACID: CPT

## 2021-01-01 PROCEDURE — 2580000003 HC RX 258

## 2021-01-01 PROCEDURE — 82947 ASSAY GLUCOSE BLOOD QUANT: CPT

## 2021-01-01 PROCEDURE — 6370000000 HC RX 637 (ALT 250 FOR IP): Performed by: PSYCHIATRY & NEUROLOGY

## 2021-01-01 PROCEDURE — 2580000003 HC RX 258: Performed by: PSYCHIATRY & NEUROLOGY

## 2021-01-01 PROCEDURE — 02HV33Z INSERTION OF INFUSION DEVICE INTO SUPERIOR VENA CAVA, PERCUTANEOUS APPROACH: ICD-10-PCS | Performed by: INTERNAL MEDICINE

## 2021-01-01 PROCEDURE — 89050 BODY FLUID CELL COUNT: CPT

## 2021-01-01 PROCEDURE — 36556 INSERT NON-TUNNEL CV CATH: CPT

## 2021-01-01 PROCEDURE — 93306 TTE W/DOPPLER COMPLETE: CPT

## 2021-01-01 PROCEDURE — 85025 COMPLETE CBC W/AUTO DIFF WBC: CPT

## 2021-01-01 PROCEDURE — 95819 EEG AWAKE AND ASLEEP: CPT | Performed by: PSYCHIATRY & NEUROLOGY

## 2021-01-01 PROCEDURE — C1769 GUIDE WIRE: HCPCS

## 2021-01-01 PROCEDURE — 85610 PROTHROMBIN TIME: CPT

## 2021-01-01 PROCEDURE — 86901 BLOOD TYPING SEROLOGIC RH(D): CPT

## 2021-01-01 PROCEDURE — 96368 THER/DIAG CONCURRENT INF: CPT

## 2021-01-01 PROCEDURE — 6360000002 HC RX W HCPCS: Performed by: STUDENT IN AN ORGANIZED HEALTH CARE EDUCATION/TRAINING PROGRAM

## 2021-01-01 PROCEDURE — 93880 EXTRACRANIAL BILAT STUDY: CPT

## 2021-01-01 PROCEDURE — 93325 DOPPLER ECHO COLOR FLOW MAPG: CPT

## 2021-01-01 PROCEDURE — 2720000010 HC SURG SUPPLY STERILE

## 2021-01-01 PROCEDURE — 6370000000 HC RX 637 (ALT 250 FOR IP): Performed by: STUDENT IN AN ORGANIZED HEALTH CARE EDUCATION/TRAINING PROGRAM

## 2021-01-01 PROCEDURE — 80053 COMPREHEN METABOLIC PANEL: CPT

## 2021-01-01 PROCEDURE — 93308 TTE F-UP OR LMTD: CPT

## 2021-01-01 PROCEDURE — 2580000003 HC RX 258: Performed by: ANESTHESIOLOGY

## 2021-01-01 PROCEDURE — 96375 TX/PRO/DX INJ NEW DRUG ADDON: CPT

## 2021-01-01 PROCEDURE — 96365 THER/PROPH/DIAG IV INF INIT: CPT

## 2021-01-01 PROCEDURE — C1894 INTRO/SHEATH, NON-LASER: HCPCS

## 2021-01-01 PROCEDURE — 92610 EVALUATE SWALLOWING FUNCTION: CPT

## 2021-01-01 PROCEDURE — 95700 EEG CONT REC W/VID EEG TECH: CPT

## 2021-01-01 PROCEDURE — 92523 SPEECH SOUND LANG COMPREHEN: CPT

## 2021-01-01 PROCEDURE — 62328 DX LMBR SPI PNXR W/FLUOR/CT: CPT

## 2021-01-01 PROCEDURE — 2580000003 HC RX 258: Performed by: REGISTERED NURSE

## 2021-01-01 PROCEDURE — 86140 C-REACTIVE PROTEIN: CPT

## 2021-01-01 PROCEDURE — 93580 TRANSCATH CLOSURE OF ASD: CPT | Performed by: INTERNAL MEDICINE

## 2021-01-01 PROCEDURE — 2500000003 HC RX 250 WO HCPCS: Performed by: REGISTERED NURSE

## 2021-01-01 PROCEDURE — 02U53JZ SUPPLEMENT ATRIAL SEPTUM WITH SYNTHETIC SUBSTITUTE, PERCUTANEOUS APPROACH: ICD-10-PCS | Performed by: INTERNAL MEDICINE

## 2021-01-01 PROCEDURE — 95816 EEG AWAKE AND DROWSY: CPT | Performed by: PSYCHIATRY & NEUROLOGY

## 2021-01-01 PROCEDURE — 93010 ELECTROCARDIOGRAM REPORT: CPT | Performed by: INTERNAL MEDICINE

## 2021-01-01 PROCEDURE — 86900 BLOOD TYPING SEROLOGIC ABO: CPT

## 2021-01-01 PROCEDURE — 92526 ORAL FUNCTION THERAPY: CPT

## 2021-01-01 PROCEDURE — B24BZZ4 ULTRASONOGRAPHY OF HEART WITH AORTA, TRANSESOPHAGEAL: ICD-10-PCS | Performed by: ANESTHESIOLOGY

## 2021-01-01 PROCEDURE — C1817 SEPTAL DEFECT IMP SYS: HCPCS

## 2021-01-01 PROCEDURE — 93580 TRANSCATH CLOSURE OF ASD: CPT

## 2021-01-01 PROCEDURE — 85014 HEMATOCRIT: CPT

## 2021-01-01 PROCEDURE — 85347 COAGULATION TIME ACTIVATED: CPT

## 2021-01-01 PROCEDURE — 83880 ASSAY OF NATRIURETIC PEPTIDE: CPT

## 2021-01-01 PROCEDURE — 2709999900 HC NON-CHARGEABLE SUPPLY

## 2021-01-01 PROCEDURE — 85576 BLOOD PLATELET AGGREGATION: CPT

## 2021-01-01 PROCEDURE — 97129 THER IVNTJ 1ST 15 MIN: CPT

## 2021-01-01 PROCEDURE — 97165 OT EVAL LOW COMPLEX 30 MIN: CPT

## 2021-01-01 PROCEDURE — 93005 ELECTROCARDIOGRAM TRACING: CPT | Performed by: INTERNAL MEDICINE

## 2021-01-01 PROCEDURE — 93321 DOPPLER ECHO F-UP/LMTD STD: CPT

## 2021-01-01 PROCEDURE — 95720 EEG PHY/QHP EA INCR W/VEEG: CPT | Performed by: PSYCHIATRY & NEUROLOGY

## 2021-01-01 PROCEDURE — 80061 LIPID PANEL: CPT

## 2021-01-01 PROCEDURE — 86850 RBC ANTIBODY SCREEN: CPT

## 2021-01-01 PROCEDURE — 36600 WITHDRAWAL OF ARTERIAL BLOOD: CPT

## 2021-01-01 RX ORDER — MAGNESIUM SULFATE IN WATER 40 MG/ML
2000 INJECTION, SOLUTION INTRAVENOUS PRN
Status: DISCONTINUED | OUTPATIENT
Start: 2021-01-01 | End: 2021-01-01

## 2021-01-01 RX ORDER — METOCLOPRAMIDE HYDROCHLORIDE 5 MG/ML
10 INJECTION INTRAMUSCULAR; INTRAVENOUS
Status: DISCONTINUED | OUTPATIENT
Start: 2021-01-01 | End: 2021-01-01

## 2021-01-01 RX ORDER — LABETALOL HYDROCHLORIDE 5 MG/ML
5 INJECTION, SOLUTION INTRAVENOUS EVERY 10 MIN PRN
Status: DISCONTINUED | OUTPATIENT
Start: 2021-01-01 | End: 2021-01-01

## 2021-01-01 RX ORDER — ACETAMINOPHEN 325 MG/1
650 TABLET ORAL EVERY 4 HOURS PRN
Status: DISCONTINUED | OUTPATIENT
Start: 2021-01-01 | End: 2021-01-01 | Stop reason: HOSPADM

## 2021-01-01 RX ORDER — MIDAZOLAM HYDROCHLORIDE 1 MG/ML
2 INJECTION INTRAMUSCULAR; INTRAVENOUS
Status: DISCONTINUED | OUTPATIENT
Start: 2021-01-01 | End: 2021-01-01

## 2021-01-01 RX ORDER — FENTANYL CITRATE 50 UG/ML
25 INJECTION, SOLUTION INTRAMUSCULAR; INTRAVENOUS
Status: DISCONTINUED | OUTPATIENT
Start: 2021-01-01 | End: 2021-01-01

## 2021-01-01 RX ORDER — SODIUM CHLORIDE 9 MG/ML
25 INJECTION, SOLUTION INTRAVENOUS PRN
Status: DISCONTINUED | OUTPATIENT
Start: 2021-01-01 | End: 2021-01-01 | Stop reason: SDUPTHER

## 2021-01-01 RX ORDER — MAGNESIUM SULFATE IN WATER 40 MG/ML
2000 INJECTION, SOLUTION INTRAVENOUS ONCE
Status: COMPLETED | OUTPATIENT
Start: 2021-01-01 | End: 2021-01-01

## 2021-01-01 RX ORDER — SODIUM CHLORIDE 9 MG/ML
INJECTION, SOLUTION INTRAVENOUS CONTINUOUS PRN
Status: DISCONTINUED | OUTPATIENT
Start: 2021-01-01 | End: 2021-01-01 | Stop reason: SDUPTHER

## 2021-01-01 RX ORDER — LORAZEPAM 2 MG/ML
2 INJECTION INTRAMUSCULAR ONCE
Status: COMPLETED | OUTPATIENT
Start: 2021-01-01 | End: 2021-01-01

## 2021-01-01 RX ORDER — SODIUM CHLORIDE, SODIUM LACTATE, POTASSIUM CHLORIDE, CALCIUM CHLORIDE 600; 310; 30; 20 MG/100ML; MG/100ML; MG/100ML; MG/100ML
INJECTION, SOLUTION INTRAVENOUS CONTINUOUS
Status: DISCONTINUED | OUTPATIENT
Start: 2021-01-01 | End: 2021-01-01

## 2021-01-01 RX ORDER — DEXAMETHASONE SODIUM PHOSPHATE 10 MG/ML
INJECTION, SOLUTION INTRAMUSCULAR; INTRAVENOUS PRN
Status: DISCONTINUED | OUTPATIENT
Start: 2021-01-01 | End: 2021-01-01 | Stop reason: SDUPTHER

## 2021-01-01 RX ORDER — MORPHINE SULFATE 4 MG/ML
2 INJECTION, SOLUTION INTRAMUSCULAR; INTRAVENOUS EVERY 5 MIN PRN
Status: DISCONTINUED | OUTPATIENT
Start: 2021-01-01 | End: 2021-01-01

## 2021-01-01 RX ORDER — METOPROLOL TARTRATE 5 MG/5ML
5 INJECTION INTRAVENOUS EVERY 6 HOURS
Status: DISCONTINUED | OUTPATIENT
Start: 2021-01-01 | End: 2021-01-01 | Stop reason: HOSPADM

## 2021-01-01 RX ORDER — POTASSIUM CHLORIDE 7.45 MG/ML
10 INJECTION INTRAVENOUS
Status: COMPLETED | OUTPATIENT
Start: 2021-01-01 | End: 2021-01-01

## 2021-01-01 RX ORDER — SODIUM CHLORIDE 9 MG/ML
INJECTION, SOLUTION INTRAVENOUS CONTINUOUS
Status: DISCONTINUED | OUTPATIENT
Start: 2021-01-01 | End: 2021-01-01

## 2021-01-01 RX ORDER — SODIUM CHLORIDE 0.9 % (FLUSH) 0.9 %
5-40 SYRINGE (ML) INJECTION PRN
Status: DISCONTINUED | OUTPATIENT
Start: 2021-01-01 | End: 2021-01-01 | Stop reason: SDUPTHER

## 2021-01-01 RX ORDER — CEFAZOLIN SODIUM 1 G/3ML
INJECTION, POWDER, FOR SOLUTION INTRAMUSCULAR; INTRAVENOUS PRN
Status: DISCONTINUED | OUTPATIENT
Start: 2021-01-01 | End: 2021-01-01 | Stop reason: SDUPTHER

## 2021-01-01 RX ORDER — METOPROLOL TARTRATE 5 MG/5ML
5 INJECTION INTRAVENOUS ONCE
Status: COMPLETED | OUTPATIENT
Start: 2021-01-01 | End: 2021-01-01

## 2021-01-01 RX ORDER — OXYCODONE AND ACETAMINOPHEN 10; 325 MG/1; MG/1
1 TABLET ORAL EVERY 4 HOURS PRN
Status: DISCONTINUED | OUTPATIENT
Start: 2021-01-01 | End: 2021-01-01

## 2021-01-01 RX ORDER — ONDANSETRON 2 MG/ML
4 INJECTION INTRAMUSCULAR; INTRAVENOUS EVERY 6 HOURS PRN
Status: DISCONTINUED | OUTPATIENT
Start: 2021-01-01 | End: 2021-01-01 | Stop reason: HOSPADM

## 2021-01-01 RX ORDER — MEPERIDINE HYDROCHLORIDE 25 MG/ML
12.5 INJECTION INTRAMUSCULAR; INTRAVENOUS; SUBCUTANEOUS EVERY 4 HOURS PRN
Status: DISCONTINUED | OUTPATIENT
Start: 2021-01-01 | End: 2021-01-01

## 2021-01-01 RX ORDER — POTASSIUM CHLORIDE 7.45 MG/ML
10 INJECTION INTRAVENOUS PRN
Status: DISCONTINUED | OUTPATIENT
Start: 2021-01-01 | End: 2021-01-01 | Stop reason: HOSPADM

## 2021-01-01 RX ORDER — ASPIRIN 81 MG/1
81 TABLET, CHEWABLE ORAL DAILY
Status: DISCONTINUED | OUTPATIENT
Start: 2021-01-01 | End: 2021-01-01 | Stop reason: HOSPADM

## 2021-01-01 RX ORDER — OXYMETAZOLINE HYDROCHLORIDE 0.05 G/100ML
2 SPRAY NASAL 2 TIMES DAILY
Status: DISPENSED | OUTPATIENT
Start: 2021-01-01 | End: 2021-01-01

## 2021-01-01 RX ORDER — PREGABALIN 150 MG/1
150 CAPSULE ORAL 2 TIMES DAILY
COMMUNITY

## 2021-01-01 RX ORDER — HYDRALAZINE HYDROCHLORIDE 20 MG/ML
5 INJECTION INTRAMUSCULAR; INTRAVENOUS EVERY 10 MIN PRN
Status: DISCONTINUED | OUTPATIENT
Start: 2021-01-01 | End: 2021-01-01

## 2021-01-01 RX ORDER — POTASSIUM CHLORIDE 7.45 MG/ML
10 INJECTION INTRAVENOUS ONCE
Status: COMPLETED | OUTPATIENT
Start: 2021-01-01 | End: 2021-01-01

## 2021-01-01 RX ORDER — POTASSIUM CHLORIDE 750 MG/1
10 TABLET, EXTENDED RELEASE ORAL 2 TIMES DAILY
Status: COMPLETED | OUTPATIENT
Start: 2021-01-01 | End: 2021-01-01

## 2021-01-01 RX ORDER — POTASSIUM CHLORIDE 20 MEQ/1
40 TABLET, EXTENDED RELEASE ORAL PRN
Status: DISCONTINUED | OUTPATIENT
Start: 2021-01-01 | End: 2021-01-01 | Stop reason: HOSPADM

## 2021-01-01 RX ORDER — POLYETHYLENE GLYCOL 3350 17 G/17G
17 POWDER, FOR SOLUTION ORAL DAILY PRN
Status: DISCONTINUED | OUTPATIENT
Start: 2021-01-01 | End: 2021-01-01 | Stop reason: HOSPADM

## 2021-01-01 RX ORDER — 0.9 % SODIUM CHLORIDE 0.9 %
500 INTRAVENOUS SOLUTION INTRAVENOUS ONCE
Status: COMPLETED | OUTPATIENT
Start: 2021-01-01 | End: 2021-01-01

## 2021-01-01 RX ORDER — SODIUM CHLORIDE 9 MG/ML
25 INJECTION, SOLUTION INTRAVENOUS PRN
Status: DISCONTINUED | OUTPATIENT
Start: 2021-01-01 | End: 2021-01-01 | Stop reason: HOSPADM

## 2021-01-01 RX ORDER — PREGABALIN 150 MG/1
150 CAPSULE ORAL 2 TIMES DAILY
Status: DISCONTINUED | OUTPATIENT
Start: 2021-01-01 | End: 2021-01-01

## 2021-01-01 RX ORDER — LORAZEPAM 2 MG/ML
0.5 INJECTION INTRAMUSCULAR EVERY 4 HOURS PRN
Status: DISCONTINUED | OUTPATIENT
Start: 2021-01-01 | End: 2021-01-01 | Stop reason: HOSPADM

## 2021-01-01 RX ORDER — SCOLOPAMINE TRANSDERMAL SYSTEM 1 MG/1
1 PATCH, EXTENDED RELEASE TRANSDERMAL ONCE
Status: DISCONTINUED | OUTPATIENT
Start: 2021-01-01 | End: 2021-01-01

## 2021-01-01 RX ORDER — LOSARTAN POTASSIUM 100 MG/1
100 TABLET ORAL DAILY
Status: DISCONTINUED | OUTPATIENT
Start: 2021-01-01 | End: 2021-01-01

## 2021-01-01 RX ORDER — MEPERIDINE HYDROCHLORIDE 25 MG/ML
12.5 INJECTION INTRAMUSCULAR; INTRAVENOUS; SUBCUTANEOUS EVERY 6 HOURS PRN
Status: DISCONTINUED | OUTPATIENT
Start: 2021-01-01 | End: 2021-01-01

## 2021-01-01 RX ORDER — DIPHENHYDRAMINE HYDROCHLORIDE 50 MG/ML
12.5 INJECTION INTRAMUSCULAR; INTRAVENOUS
Status: DISCONTINUED | OUTPATIENT
Start: 2021-01-01 | End: 2021-01-01

## 2021-01-01 RX ORDER — FENTANYL 25 UG/H
1 PATCH TRANSDERMAL
Status: DISCONTINUED | OUTPATIENT
Start: 2021-01-01 | End: 2021-01-01

## 2021-01-01 RX ORDER — ZIPRASIDONE MESYLATE 20 MG/ML
20 INJECTION, POWDER, LYOPHILIZED, FOR SOLUTION INTRAMUSCULAR ONCE
Status: COMPLETED | OUTPATIENT
Start: 2021-01-01 | End: 2021-01-01

## 2021-01-01 RX ORDER — POTASSIUM CHLORIDE 750 MG/1
10 CAPSULE, EXTENDED RELEASE ORAL 2 TIMES DAILY
COMMUNITY

## 2021-01-01 RX ORDER — POTASSIUM CHLORIDE 20 MEQ/1
40 TABLET, EXTENDED RELEASE ORAL ONCE
Status: DISCONTINUED | OUTPATIENT
Start: 2021-01-01 | End: 2021-01-01

## 2021-01-01 RX ORDER — ROCURONIUM BROMIDE 10 MG/ML
INJECTION, SOLUTION INTRAVENOUS PRN
Status: DISCONTINUED | OUTPATIENT
Start: 2021-01-01 | End: 2021-01-01 | Stop reason: SDUPTHER

## 2021-01-01 RX ORDER — BISACODYL 10 MG
10 SUPPOSITORY, RECTAL RECTAL DAILY
Status: DISCONTINUED | OUTPATIENT
Start: 2021-01-01 | End: 2021-01-01 | Stop reason: HOSPADM

## 2021-01-01 RX ORDER — FOSPHENYTOIN SODIUM 50 MG/ML
100 INJECTION, SOLUTION INTRAMUSCULAR; INTRAVENOUS EVERY 8 HOURS
Status: DISCONTINUED | OUTPATIENT
Start: 2021-01-01 | End: 2021-01-01

## 2021-01-01 RX ORDER — HYDROMORPHONE HYDROCHLORIDE 1 MG/ML
1 INJECTION, SOLUTION INTRAMUSCULAR; INTRAVENOUS; SUBCUTANEOUS EVERY 4 HOURS PRN
Status: DISCONTINUED | OUTPATIENT
Start: 2021-01-01 | End: 2021-01-01

## 2021-01-01 RX ORDER — SODIUM CHLORIDE 0.9 % (FLUSH) 0.9 %
5-40 SYRINGE (ML) INJECTION PRN
Status: DISCONTINUED | OUTPATIENT
Start: 2021-01-01 | End: 2021-01-01 | Stop reason: HOSPADM

## 2021-01-01 RX ORDER — ACETAMINOPHEN 650 MG/1
650 SUPPOSITORY RECTAL EVERY 6 HOURS PRN
Status: DISCONTINUED | OUTPATIENT
Start: 2021-01-01 | End: 2021-01-01 | Stop reason: HOSPADM

## 2021-01-01 RX ORDER — LIDOCAINE HYDROCHLORIDE 10 MG/ML
INJECTION, SOLUTION EPIDURAL; INFILTRATION; INTRACAUDAL; PERINEURAL PRN
Status: DISCONTINUED | OUTPATIENT
Start: 2021-01-01 | End: 2021-01-01 | Stop reason: SDUPTHER

## 2021-01-01 RX ORDER — PROPOFOL 10 MG/ML
INJECTION, EMULSION INTRAVENOUS PRN
Status: DISCONTINUED | OUTPATIENT
Start: 2021-01-01 | End: 2021-01-01 | Stop reason: SDUPTHER

## 2021-01-01 RX ORDER — CLOPIDOGREL BISULFATE 75 MG/1
75 TABLET ORAL DAILY
Status: DISCONTINUED | OUTPATIENT
Start: 2021-01-01 | End: 2021-01-01

## 2021-01-01 RX ORDER — DEXTROSE MONOHYDRATE 50 MG/ML
100 INJECTION, SOLUTION INTRAVENOUS PRN
Status: DISCONTINUED | OUTPATIENT
Start: 2021-01-01 | End: 2021-01-01 | Stop reason: HOSPADM

## 2021-01-01 RX ORDER — FOSPHENYTOIN SODIUM 50 MG/ML
100 INJECTION, SOLUTION INTRAMUSCULAR; INTRAVENOUS EVERY 8 HOURS
Status: DISCONTINUED | OUTPATIENT
Start: 2021-01-01 | End: 2021-01-01 | Stop reason: SDUPTHER

## 2021-01-01 RX ORDER — SODIUM CHLORIDE 0.9 % (FLUSH) 0.9 %
5-40 SYRINGE (ML) INJECTION EVERY 12 HOURS SCHEDULED
Status: DISCONTINUED | OUTPATIENT
Start: 2021-01-01 | End: 2021-01-01 | Stop reason: SDUPTHER

## 2021-01-01 RX ORDER — SODIUM CHLORIDE 0.9 % (FLUSH) 0.9 %
5-40 SYRINGE (ML) INJECTION PRN
Status: DISCONTINUED | OUTPATIENT
Start: 2021-01-01 | End: 2021-01-01

## 2021-01-01 RX ORDER — MEPERIDINE HYDROCHLORIDE 50 MG/ML
12.5 INJECTION INTRAMUSCULAR; INTRAVENOUS; SUBCUTANEOUS EVERY 5 MIN PRN
Status: DISCONTINUED | OUTPATIENT
Start: 2021-01-01 | End: 2021-01-01

## 2021-01-01 RX ORDER — MAGNESIUM SULFATE 1 G/100ML
1000 INJECTION INTRAVENOUS ONCE
Status: COMPLETED | OUTPATIENT
Start: 2021-01-01 | End: 2021-01-01

## 2021-01-01 RX ORDER — SODIUM CHLORIDE, SODIUM LACTATE, POTASSIUM CHLORIDE, CALCIUM CHLORIDE 600; 310; 30; 20 MG/100ML; MG/100ML; MG/100ML; MG/100ML
INJECTION, SOLUTION INTRAVENOUS CONTINUOUS PRN
Status: DISCONTINUED | OUTPATIENT
Start: 2021-01-01 | End: 2021-01-01

## 2021-01-01 RX ORDER — NICOTINE POLACRILEX 4 MG
15 LOZENGE BUCCAL PRN
Status: DISCONTINUED | OUTPATIENT
Start: 2021-01-01 | End: 2021-01-01 | Stop reason: HOSPADM

## 2021-01-01 RX ORDER — PROMETHAZINE HYDROCHLORIDE 25 MG/ML
6.25 INJECTION, SOLUTION INTRAMUSCULAR; INTRAVENOUS
Status: DISCONTINUED | OUTPATIENT
Start: 2021-01-01 | End: 2021-01-01

## 2021-01-01 RX ORDER — LOSARTAN POTASSIUM 100 MG/1
100 TABLET ORAL DAILY
COMMUNITY

## 2021-01-01 RX ORDER — MORPHINE SULFATE 4 MG/ML
4 INJECTION, SOLUTION INTRAMUSCULAR; INTRAVENOUS
Status: DISCONTINUED | OUTPATIENT
Start: 2021-01-01 | End: 2021-01-01

## 2021-01-01 RX ORDER — HYDROMORPHONE HYDROCHLORIDE 1 MG/ML
0.5 INJECTION, SOLUTION INTRAMUSCULAR; INTRAVENOUS; SUBCUTANEOUS EVERY 5 MIN PRN
Status: DISCONTINUED | OUTPATIENT
Start: 2021-01-01 | End: 2021-01-01

## 2021-01-01 RX ORDER — DEXTROSE MONOHYDRATE 25 G/50ML
12.5 INJECTION, SOLUTION INTRAVENOUS PRN
Status: DISCONTINUED | OUTPATIENT
Start: 2021-01-01 | End: 2021-01-01 | Stop reason: HOSPADM

## 2021-01-01 RX ORDER — ONDANSETRON 2 MG/ML
INJECTION INTRAMUSCULAR; INTRAVENOUS PRN
Status: DISCONTINUED | OUTPATIENT
Start: 2021-01-01 | End: 2021-01-01 | Stop reason: SDUPTHER

## 2021-01-01 RX ORDER — HEPARIN SODIUM 1000 [USP'U]/ML
INJECTION, SOLUTION INTRAVENOUS; SUBCUTANEOUS PRN
Status: DISCONTINUED | OUTPATIENT
Start: 2021-01-01 | End: 2021-01-01 | Stop reason: SDUPTHER

## 2021-01-01 RX ORDER — GLIPIZIDE 5 MG/1
5 TABLET, FILM COATED, EXTENDED RELEASE ORAL DAILY
COMMUNITY

## 2021-01-01 RX ORDER — LIDOCAINE HYDROCHLORIDE 10 MG/ML
1 INJECTION, SOLUTION EPIDURAL; INFILTRATION; INTRACAUDAL; PERINEURAL
Status: DISCONTINUED | OUTPATIENT
Start: 2021-01-01 | End: 2021-01-01

## 2021-01-01 RX ORDER — OXYCODONE AND ACETAMINOPHEN 10; 325 MG/1; MG/1
1 TABLET ORAL EVERY 6 HOURS PRN
COMMUNITY

## 2021-01-01 RX ORDER — HYDROMORPHONE HYDROCHLORIDE 1 MG/ML
0.25 INJECTION, SOLUTION INTRAMUSCULAR; INTRAVENOUS; SUBCUTANEOUS EVERY 5 MIN PRN
Status: DISCONTINUED | OUTPATIENT
Start: 2021-01-01 | End: 2021-01-01

## 2021-01-01 RX ORDER — SODIUM CHLORIDE 0.9 % (FLUSH) 0.9 %
5-40 SYRINGE (ML) INJECTION EVERY 12 HOURS SCHEDULED
Status: DISCONTINUED | OUTPATIENT
Start: 2021-01-01 | End: 2021-01-01 | Stop reason: HOSPADM

## 2021-01-01 RX ORDER — FUROSEMIDE 20 MG/1
20 TABLET ORAL DAILY
COMMUNITY

## 2021-01-01 RX ORDER — POTASSIUM CHLORIDE 7.45 MG/ML
20 INJECTION INTRAVENOUS ONCE
Status: COMPLETED | OUTPATIENT
Start: 2021-01-01 | End: 2021-01-01

## 2021-01-01 RX ORDER — FENTANYL CITRATE 50 UG/ML
50 INJECTION, SOLUTION INTRAMUSCULAR; INTRAVENOUS
Status: DISCONTINUED | OUTPATIENT
Start: 2021-01-01 | End: 2021-01-01

## 2021-01-01 RX ORDER — POTASSIUM CHLORIDE 7.45 MG/ML
10 INJECTION INTRAVENOUS
Status: DISPENSED | OUTPATIENT
Start: 2021-01-01 | End: 2021-01-01

## 2021-01-01 RX ORDER — HYDRALAZINE HYDROCHLORIDE 20 MG/ML
5 INJECTION INTRAMUSCULAR; INTRAVENOUS EVERY 4 HOURS PRN
Status: DISCONTINUED | OUTPATIENT
Start: 2021-01-01 | End: 2021-01-01 | Stop reason: HOSPADM

## 2021-01-01 RX ORDER — ACETAMINOPHEN 325 MG/1
650 TABLET ORAL EVERY 6 HOURS PRN
Status: DISCONTINUED | OUTPATIENT
Start: 2021-01-01 | End: 2021-01-01 | Stop reason: SDUPTHER

## 2021-01-01 RX ORDER — MORPHINE SULFATE 4 MG/ML
4 INJECTION, SOLUTION INTRAMUSCULAR; INTRAVENOUS EVERY 5 MIN PRN
Status: DISCONTINUED | OUTPATIENT
Start: 2021-01-01 | End: 2021-01-01

## 2021-01-01 RX ORDER — FOSPHENYTOIN SODIUM 50 MG/ML
200 INJECTION, SOLUTION INTRAMUSCULAR; INTRAVENOUS EVERY 12 HOURS
Status: DISCONTINUED | OUTPATIENT
Start: 2021-01-01 | End: 2021-01-01 | Stop reason: HOSPADM

## 2021-01-01 RX ORDER — SODIUM CHLORIDE 0.9 % (FLUSH) 0.9 %
5-40 SYRINGE (ML) INJECTION EVERY 12 HOURS SCHEDULED
Status: DISCONTINUED | OUTPATIENT
Start: 2021-01-01 | End: 2021-01-01

## 2021-01-01 RX ORDER — FENTANYL CITRATE 50 UG/ML
INJECTION, SOLUTION INTRAMUSCULAR; INTRAVENOUS PRN
Status: DISCONTINUED | OUTPATIENT
Start: 2021-01-01 | End: 2021-01-01 | Stop reason: SDUPTHER

## 2021-01-01 RX ADMIN — INSULIN LISPRO 1 UNITS: 100 INJECTION, SOLUTION INTRAVENOUS; SUBCUTANEOUS at 11:55

## 2021-01-01 RX ADMIN — DEXTROSE MONOHYDRATE 1000 MG PE: 50 INJECTION, SOLUTION INTRAVENOUS at 21:01

## 2021-01-01 RX ADMIN — INSULIN LISPRO 1 UNITS: 100 INJECTION, SOLUTION INTRAVENOUS; SUBCUTANEOUS at 22:38

## 2021-01-01 RX ADMIN — FOSPHENYTOIN SODIUM 100 MG PE: 50 INJECTION, SOLUTION INTRAMUSCULAR; INTRAVENOUS at 03:35

## 2021-01-01 RX ADMIN — SODIUM CHLORIDE, PRESERVATIVE FREE 10 ML: 5 INJECTION INTRAVENOUS at 22:15

## 2021-01-01 RX ADMIN — OXYCODONE HYDROCHLORIDE AND ACETAMINOPHEN 1 TABLET: 10; 325 TABLET ORAL at 15:21

## 2021-01-01 RX ADMIN — SODIUM CHLORIDE, PRESERVATIVE FREE 10 ML: 5 INJECTION INTRAVENOUS at 19:32

## 2021-01-01 RX ADMIN — HYDROMORPHONE HYDROCHLORIDE 1 MG: 1 INJECTION, SOLUTION INTRAMUSCULAR; INTRAVENOUS; SUBCUTANEOUS at 01:15

## 2021-01-01 RX ADMIN — HYDROMORPHONE HYDROCHLORIDE 1 MG: 1 INJECTION, SOLUTION INTRAMUSCULAR; INTRAVENOUS; SUBCUTANEOUS at 19:34

## 2021-01-01 RX ADMIN — METOPROLOL TARTRATE 5 MG: 5 INJECTION INTRAVENOUS at 14:32

## 2021-01-01 RX ADMIN — INSULIN LISPRO 1 UNITS: 100 INJECTION, SOLUTION INTRAVENOUS; SUBCUTANEOUS at 20:24

## 2021-01-01 RX ADMIN — SODIUM CHLORIDE: 9 INJECTION, SOLUTION INTRAVENOUS at 03:40

## 2021-01-01 RX ADMIN — INSULIN LISPRO 1 UNITS: 100 INJECTION, SOLUTION INTRAVENOUS; SUBCUTANEOUS at 21:04

## 2021-01-01 RX ADMIN — HYDRALAZINE HYDROCHLORIDE 5 MG: 20 INJECTION INTRAMUSCULAR; INTRAVENOUS at 15:15

## 2021-01-01 RX ADMIN — DEXTROSE MONOHYDRATE 100 MG: 50 INJECTION, SOLUTION INTRAVENOUS at 22:57

## 2021-01-01 RX ADMIN — DEXTROSE MONOHYDRATE 100 MG: 50 INJECTION, SOLUTION INTRAVENOUS at 10:03

## 2021-01-01 RX ADMIN — METOPROLOL TARTRATE 5 MG: 5 INJECTION INTRAVENOUS at 17:22

## 2021-01-01 RX ADMIN — METOPROLOL TARTRATE 5 MG: 5 INJECTION INTRAVENOUS at 19:54

## 2021-01-01 RX ADMIN — LORAZEPAM 0.5 MG: 2 INJECTION INTRAMUSCULAR; INTRAVENOUS at 08:52

## 2021-01-01 RX ADMIN — BISACODYL 10 MG: 10 SUPPOSITORY RECTAL at 08:38

## 2021-01-01 RX ADMIN — TICAGRELOR 90 MG: 90 TABLET ORAL at 11:44

## 2021-01-01 RX ADMIN — PHENYLEPHRINE HYDROCHLORIDE 200 MCG: 10 INJECTION INTRAVENOUS at 11:37

## 2021-01-01 RX ADMIN — VALPROATE SODIUM 1500 MG: 100 INJECTION, SOLUTION INTRAVENOUS at 10:02

## 2021-01-01 RX ADMIN — FOSPHENYTOIN SODIUM 100 MG PE: 50 INJECTION, SOLUTION INTRAMUSCULAR; INTRAVENOUS at 11:37

## 2021-01-01 RX ADMIN — DEXAMETHASONE SODIUM PHOSPHATE 10 MG: 10 INJECTION, SOLUTION INTRAMUSCULAR; INTRAVENOUS at 11:32

## 2021-01-01 RX ADMIN — SODIUM CHLORIDE, PRESERVATIVE FREE 10 ML: 5 INJECTION INTRAVENOUS at 08:20

## 2021-01-01 RX ADMIN — VALPROATE SODIUM 1000 MG: 100 INJECTION, SOLUTION INTRAVENOUS at 03:41

## 2021-01-01 RX ADMIN — FOSPHENYTOIN SODIUM 100 MG PE: 50 INJECTION, SOLUTION INTRAMUSCULAR; INTRAVENOUS at 21:57

## 2021-01-01 RX ADMIN — HYDROMORPHONE HYDROCHLORIDE 1 MG: 1 INJECTION, SOLUTION INTRAMUSCULAR; INTRAVENOUS; SUBCUTANEOUS at 18:15

## 2021-01-01 RX ADMIN — OXYCODONE HYDROCHLORIDE AND ACETAMINOPHEN 1 TABLET: 10; 325 TABLET ORAL at 03:24

## 2021-01-01 RX ADMIN — SODIUM CHLORIDE, PRESERVATIVE FREE 10 ML: 5 INJECTION INTRAVENOUS at 20:44

## 2021-01-01 RX ADMIN — ENOXAPARIN SODIUM 100 MG: 100 INJECTION SUBCUTANEOUS at 09:35

## 2021-01-01 RX ADMIN — METOPROLOL TARTRATE 5 MG: 5 INJECTION INTRAVENOUS at 07:37

## 2021-01-01 RX ADMIN — METOPROLOL TARTRATE 5 MG: 5 INJECTION INTRAVENOUS at 02:35

## 2021-01-01 RX ADMIN — VALPROATE SODIUM 500 MG: 100 INJECTION, SOLUTION INTRAVENOUS at 12:56

## 2021-01-01 RX ADMIN — METOPROLOL TARTRATE 5 MG: 5 INJECTION INTRAVENOUS at 11:10

## 2021-01-01 RX ADMIN — FOSPHENYTOIN SODIUM 100 MG PE: 50 INJECTION, SOLUTION INTRAMUSCULAR; INTRAVENOUS at 11:10

## 2021-01-01 RX ADMIN — VALPROATE SODIUM 1000 MG: 100 INJECTION, SOLUTION INTRAVENOUS at 22:15

## 2021-01-01 RX ADMIN — VALPROATE SODIUM 500 MG: 100 INJECTION, SOLUTION INTRAVENOUS at 13:12

## 2021-01-01 RX ADMIN — INSULIN LISPRO 1 UNITS: 100 INJECTION, SOLUTION INTRAVENOUS; SUBCUTANEOUS at 20:48

## 2021-01-01 RX ADMIN — SODIUM CHLORIDE, PRESERVATIVE FREE 10 ML: 5 INJECTION INTRAVENOUS at 11:34

## 2021-01-01 RX ADMIN — DEXTROSE MONOHYDRATE 200 MG: 50 INJECTION, SOLUTION INTRAVENOUS at 12:00

## 2021-01-01 RX ADMIN — MAGNESIUM SULFATE HEPTAHYDRATE 2000 MG: 40 INJECTION, SOLUTION INTRAVENOUS at 14:01

## 2021-01-01 RX ADMIN — VALPROATE SODIUM 500 MG: 100 INJECTION, SOLUTION INTRAVENOUS at 20:24

## 2021-01-01 RX ADMIN — VALPROATE SODIUM 1000 MG: 100 INJECTION, SOLUTION INTRAVENOUS at 03:18

## 2021-01-01 RX ADMIN — SODIUM CHLORIDE: 9 INJECTION, SOLUTION INTRAVENOUS at 03:41

## 2021-01-01 RX ADMIN — ASPIRIN 81 MG: 81 TABLET, CHEWABLE ORAL at 11:10

## 2021-01-01 RX ADMIN — SODIUM CHLORIDE, PRESERVATIVE FREE 10 ML: 5 INJECTION INTRAVENOUS at 21:03

## 2021-01-01 RX ADMIN — HYDRALAZINE HYDROCHLORIDE 5 MG: 20 INJECTION INTRAMUSCULAR; INTRAVENOUS at 08:20

## 2021-01-01 RX ADMIN — POTASSIUM CHLORIDE 10 MEQ: 7.46 INJECTION, SOLUTION INTRAVENOUS at 05:03

## 2021-01-01 RX ADMIN — ENOXAPARIN SODIUM 100 MG: 100 INJECTION SUBCUTANEOUS at 07:40

## 2021-01-01 RX ADMIN — DEXTROSE MONOHYDRATE 100 MG: 50 INJECTION, SOLUTION INTRAVENOUS at 10:35

## 2021-01-01 RX ADMIN — POTASSIUM CHLORIDE 10 MEQ: 10 INJECTION, SOLUTION INTRAVENOUS at 17:18

## 2021-01-01 RX ADMIN — HEPARIN SODIUM 10000 UNITS: 1000 INJECTION INTRAVENOUS; SUBCUTANEOUS at 11:45

## 2021-01-01 RX ADMIN — ASPIRIN 81 MG: 81 TABLET, CHEWABLE ORAL at 08:37

## 2021-01-01 RX ADMIN — SODIUM CHLORIDE, PRESERVATIVE FREE 10 ML: 5 INJECTION INTRAVENOUS at 08:28

## 2021-01-01 RX ADMIN — BISACODYL 10 MG: 10 SUPPOSITORY RECTAL at 10:03

## 2021-01-01 RX ADMIN — METOPROLOL TARTRATE 5 MG: 5 INJECTION INTRAVENOUS at 00:18

## 2021-01-01 RX ADMIN — CLOPIDOGREL BISULFATE 75 MG: 75 TABLET ORAL at 08:28

## 2021-01-01 RX ADMIN — HYDROMORPHONE HYDROCHLORIDE 1 MG: 1 INJECTION, SOLUTION INTRAMUSCULAR; INTRAVENOUS; SUBCUTANEOUS at 16:42

## 2021-01-01 RX ADMIN — ONDANSETRON HYDROCHLORIDE 4 MG: 2 SOLUTION INTRAMUSCULAR; INTRAVENOUS at 11:32

## 2021-01-01 RX ADMIN — METOPROLOL TARTRATE 5 MG: 5 INJECTION INTRAVENOUS at 06:16

## 2021-01-01 RX ADMIN — HYDROMORPHONE HYDROCHLORIDE 1 MG: 1 INJECTION, SOLUTION INTRAMUSCULAR; INTRAVENOUS; SUBCUTANEOUS at 22:19

## 2021-01-01 RX ADMIN — MAGNESIUM HYDROXIDE 30 ML: 400 SUSPENSION ORAL at 11:00

## 2021-01-01 RX ADMIN — LORAZEPAM 0.5 MG: 2 INJECTION INTRAMUSCULAR; INTRAVENOUS at 10:23

## 2021-01-01 RX ADMIN — BISACODYL 10 MG: 10 SUPPOSITORY RECTAL at 11:10

## 2021-01-01 RX ADMIN — METOPROLOL TARTRATE 5 MG: 5 INJECTION INTRAVENOUS at 12:07

## 2021-01-01 RX ADMIN — PREGABALIN 150 MG: 150 CAPSULE ORAL at 07:40

## 2021-01-01 RX ADMIN — DEXTROSE MONOHYDRATE 1000 MG: 50 INJECTION, SOLUTION INTRAVENOUS at 04:21

## 2021-01-01 RX ADMIN — OXYCODONE HYDROCHLORIDE AND ACETAMINOPHEN 1 TABLET: 10; 325 TABLET ORAL at 12:48

## 2021-01-01 RX ADMIN — POTASSIUM CHLORIDE 10 MEQ: 10 INJECTION, SOLUTION INTRAVENOUS at 18:08

## 2021-01-01 RX ADMIN — METOPROLOL TARTRATE 5 MG: 5 INJECTION INTRAVENOUS at 14:49

## 2021-01-01 RX ADMIN — LORAZEPAM 0.5 MG: 2 INJECTION INTRAMUSCULAR; INTRAVENOUS at 15:00

## 2021-01-01 RX ADMIN — DEXTROSE MONOHYDRATE 1000 MG: 50 INJECTION, SOLUTION INTRAVENOUS at 16:47

## 2021-01-01 RX ADMIN — INSULIN LISPRO 1 UNITS: 100 INJECTION, SOLUTION INTRAVENOUS; SUBCUTANEOUS at 17:09

## 2021-01-01 RX ADMIN — BISACODYL 10 MG: 10 SUPPOSITORY RECTAL at 08:16

## 2021-01-01 RX ADMIN — INSULIN LISPRO 1 UNITS: 100 INJECTION, SOLUTION INTRAVENOUS; SUBCUTANEOUS at 12:44

## 2021-01-01 RX ADMIN — PREGABALIN 150 MG: 150 CAPSULE ORAL at 08:28

## 2021-01-01 RX ADMIN — METOPROLOL TARTRATE 5 MG: 5 INJECTION INTRAVENOUS at 00:02

## 2021-01-01 RX ADMIN — FOSPHENYTOIN SODIUM 600 MG PE: 50 INJECTION, SOLUTION INTRAMUSCULAR; INTRAVENOUS at 10:52

## 2021-01-01 RX ADMIN — DEXTROSE MONOHYDRATE 200 MG: 50 INJECTION, SOLUTION INTRAVENOUS at 11:11

## 2021-01-01 RX ADMIN — POTASSIUM CHLORIDE 20 MEQ: 10 INJECTION, SOLUTION INTRAVENOUS at 10:55

## 2021-01-01 RX ADMIN — MAGNESIUM HYDROXIDE 30 ML: 400 SUSPENSION ORAL at 11:44

## 2021-01-01 RX ADMIN — DEXTROSE MONOHYDRATE 100 MG: 50 INJECTION, SOLUTION INTRAVENOUS at 23:18

## 2021-01-01 RX ADMIN — HYDROMORPHONE HYDROCHLORIDE 1 MG: 1 INJECTION, SOLUTION INTRAMUSCULAR; INTRAVENOUS; SUBCUTANEOUS at 03:43

## 2021-01-01 RX ADMIN — OXYCODONE HYDROCHLORIDE AND ACETAMINOPHEN 1 TABLET: 10; 325 TABLET ORAL at 02:08

## 2021-01-01 RX ADMIN — SODIUM CHLORIDE, PRESERVATIVE FREE 10 ML: 5 INJECTION INTRAVENOUS at 20:29

## 2021-01-01 RX ADMIN — VALPROATE SODIUM 500 MG: 100 INJECTION, SOLUTION INTRAVENOUS at 05:14

## 2021-01-01 RX ADMIN — INSULIN LISPRO 1 UNITS: 100 INJECTION, SOLUTION INTRAVENOUS; SUBCUTANEOUS at 08:22

## 2021-01-01 RX ADMIN — FOSPHENYTOIN SODIUM 100 MG PE: 50 INJECTION, SOLUTION INTRAMUSCULAR; INTRAVENOUS at 02:39

## 2021-01-01 RX ADMIN — SODIUM CHLORIDE: 9 INJECTION, SOLUTION INTRAVENOUS at 12:50

## 2021-01-01 RX ADMIN — VALPROATE SODIUM 1500 MG: 100 INJECTION, SOLUTION INTRAVENOUS at 17:00

## 2021-01-01 RX ADMIN — METOPROLOL TARTRATE 5 MG: 5 INJECTION INTRAVENOUS at 21:57

## 2021-01-01 RX ADMIN — METOPROLOL TARTRATE 5 MG: 5 INJECTION INTRAVENOUS at 18:30

## 2021-01-01 RX ADMIN — FENTANYL CITRATE 50 MCG: 50 INJECTION, SOLUTION INTRAMUSCULAR; INTRAVENOUS at 11:24

## 2021-01-01 RX ADMIN — HYDRALAZINE HYDROCHLORIDE 5 MG: 20 INJECTION INTRAMUSCULAR; INTRAVENOUS at 12:54

## 2021-01-01 RX ADMIN — METOPROLOL TARTRATE 5 MG: 5 INJECTION INTRAVENOUS at 18:41

## 2021-01-01 RX ADMIN — BISACODYL 10 MG: 10 SUPPOSITORY RECTAL at 10:17

## 2021-01-01 RX ADMIN — PREGABALIN 150 MG: 150 CAPSULE ORAL at 20:27

## 2021-01-01 RX ADMIN — ASPIRIN 81 MG: 81 TABLET, CHEWABLE ORAL at 07:58

## 2021-01-01 RX ADMIN — ASPIRIN 81 MG: 81 TABLET, CHEWABLE ORAL at 07:45

## 2021-01-01 RX ADMIN — SODIUM CHLORIDE: 9 INJECTION, SOLUTION INTRAVENOUS at 12:05

## 2021-01-01 RX ADMIN — OXYCODONE HYDROCHLORIDE AND ACETAMINOPHEN 1 TABLET: 10; 325 TABLET ORAL at 09:39

## 2021-01-01 RX ADMIN — SODIUM CHLORIDE, PRESERVATIVE FREE 10 ML: 5 INJECTION INTRAVENOUS at 08:16

## 2021-01-01 RX ADMIN — HYDROMORPHONE HYDROCHLORIDE 1 MG: 1 INJECTION, SOLUTION INTRAMUSCULAR; INTRAVENOUS; SUBCUTANEOUS at 08:19

## 2021-01-01 RX ADMIN — WATER 10 ML: 1 INJECTION INTRAMUSCULAR; INTRAVENOUS; SUBCUTANEOUS at 11:35

## 2021-01-01 RX ADMIN — FENTANYL CITRATE 25 MCG: 50 INJECTION, SOLUTION INTRAMUSCULAR; INTRAVENOUS at 18:01

## 2021-01-01 RX ADMIN — POTASSIUM CHLORIDE 10 MEQ: 10 INJECTION, SOLUTION INTRAVENOUS at 15:17

## 2021-01-01 RX ADMIN — PREGABALIN 150 MG: 150 CAPSULE ORAL at 19:34

## 2021-01-01 RX ADMIN — METOPROLOL TARTRATE 5 MG: 5 INJECTION INTRAVENOUS at 07:24

## 2021-01-01 RX ADMIN — LORAZEPAM 0.5 MG: 2 INJECTION INTRAMUSCULAR; INTRAVENOUS at 11:07

## 2021-01-01 RX ADMIN — ASPIRIN 81 MG: 81 TABLET, CHEWABLE ORAL at 09:35

## 2021-01-01 RX ADMIN — METOPROLOL TARTRATE 5 MG: 5 INJECTION INTRAVENOUS at 05:41

## 2021-01-01 RX ADMIN — PREGABALIN 150 MG: 150 CAPSULE ORAL at 09:34

## 2021-01-01 RX ADMIN — VALPROATE SODIUM 1000 MG: 100 INJECTION, SOLUTION INTRAVENOUS at 14:32

## 2021-01-01 RX ADMIN — LIDOCAINE HYDROCHLORIDE 50 MG: 10 INJECTION, SOLUTION EPIDURAL; INFILTRATION; INTRACAUDAL; PERINEURAL at 11:24

## 2021-01-01 RX ADMIN — SODIUM CHLORIDE, PRESERVATIVE FREE 10 ML: 5 INJECTION INTRAVENOUS at 10:07

## 2021-01-01 RX ADMIN — FAMOTIDINE 20 MG: 10 INJECTION, SOLUTION INTRAVENOUS at 08:20

## 2021-01-01 RX ADMIN — FAMOTIDINE 20 MG: 10 INJECTION, SOLUTION INTRAVENOUS at 09:30

## 2021-01-01 RX ADMIN — SODIUM CHLORIDE: 9 INJECTION, SOLUTION INTRAVENOUS at 22:50

## 2021-01-01 RX ADMIN — CLOPIDOGREL BISULFATE 75 MG: 75 TABLET ORAL at 08:37

## 2021-01-01 RX ADMIN — METOPROLOL TARTRATE 5 MG: 5 INJECTION INTRAVENOUS at 08:00

## 2021-01-01 RX ADMIN — POTASSIUM CHLORIDE 10 MEQ: 7.46 INJECTION, SOLUTION INTRAVENOUS at 11:54

## 2021-01-01 RX ADMIN — SODIUM CHLORIDE 500 ML: 9 INJECTION, SOLUTION INTRAVENOUS at 05:12

## 2021-01-01 RX ADMIN — LORAZEPAM 0.5 MG: 2 INJECTION INTRAMUSCULAR; INTRAVENOUS at 18:36

## 2021-01-01 RX ADMIN — PROPOFOL 150 MG: 10 INJECTION, EMULSION INTRAVENOUS at 11:25

## 2021-01-01 RX ADMIN — SUGAMMADEX 300 MG: 100 INJECTION, SOLUTION INTRAVENOUS at 12:08

## 2021-01-01 RX ADMIN — SODIUM CHLORIDE: 9 INJECTION, SOLUTION INTRAVENOUS at 02:42

## 2021-01-01 RX ADMIN — OXYCODONE HYDROCHLORIDE AND ACETAMINOPHEN 1 TABLET: 10; 325 TABLET ORAL at 07:45

## 2021-01-01 RX ADMIN — PHENYLEPHRINE HYDROCHLORIDE 100 MCG: 10 INJECTION INTRAVENOUS at 11:25

## 2021-01-01 RX ADMIN — POTASSIUM CHLORIDE 10 MEQ: 10 INJECTION, SOLUTION INTRAVENOUS at 16:24

## 2021-01-01 RX ADMIN — MAGNESIUM SULFATE HEPTAHYDRATE 1000 MG: 1 INJECTION, SOLUTION INTRAVENOUS at 05:04

## 2021-01-01 RX ADMIN — METOPROLOL TARTRATE 5 MG: 5 INJECTION INTRAVENOUS at 01:14

## 2021-01-01 RX ADMIN — DEXTROSE MONOHYDRATE 200 MG: 50 INJECTION, SOLUTION INTRAVENOUS at 00:29

## 2021-01-01 RX ADMIN — FOSPHENYTOIN SODIUM 100 MG PE: 50 INJECTION, SOLUTION INTRAMUSCULAR; INTRAVENOUS at 20:44

## 2021-01-01 RX ADMIN — LORAZEPAM 0.5 MG: 2 INJECTION INTRAMUSCULAR; INTRAVENOUS at 05:15

## 2021-01-01 RX ADMIN — CEFAZOLIN 2000 MG: 1 INJECTION, POWDER, FOR SOLUTION INTRAMUSCULAR; INTRAVENOUS; PARENTERAL at 11:34

## 2021-01-01 RX ADMIN — PREGABALIN 150 MG: 150 CAPSULE ORAL at 20:48

## 2021-01-01 RX ADMIN — ONDANSETRON 4 MG: 2 INJECTION INTRAMUSCULAR; INTRAVENOUS at 12:21

## 2021-01-01 RX ADMIN — SODIUM CHLORIDE, PRESERVATIVE FREE 10 ML: 5 INJECTION INTRAVENOUS at 05:03

## 2021-01-01 RX ADMIN — INSULIN LISPRO 1 UNITS: 100 INJECTION, SOLUTION INTRAVENOUS; SUBCUTANEOUS at 17:06

## 2021-01-01 RX ADMIN — VALPROATE SODIUM 1500 MG: 100 INJECTION, SOLUTION INTRAVENOUS at 17:39

## 2021-01-01 RX ADMIN — ASPIRIN 81 MG: 81 TABLET, CHEWABLE ORAL at 07:40

## 2021-01-01 RX ADMIN — POTASSIUM CHLORIDE 10 MEQ: 7.46 INJECTION, SOLUTION INTRAVENOUS at 06:27

## 2021-01-01 RX ADMIN — ASPIRIN 81 MG: 81 TABLET, CHEWABLE ORAL at 11:44

## 2021-01-01 RX ADMIN — FAMOTIDINE 20 MG: 10 INJECTION, SOLUTION INTRAVENOUS at 20:27

## 2021-01-01 RX ADMIN — METOPROLOL TARTRATE 5 MG: 5 INJECTION INTRAVENOUS at 11:51

## 2021-01-01 RX ADMIN — OXYCODONE HYDROCHLORIDE AND ACETAMINOPHEN 1 TABLET: 10; 325 TABLET ORAL at 17:06

## 2021-01-01 RX ADMIN — CLOPIDOGREL BISULFATE 75 MG: 75 TABLET ORAL at 05:03

## 2021-01-01 RX ADMIN — FAMOTIDINE 20 MG: 10 INJECTION, SOLUTION INTRAVENOUS at 09:35

## 2021-01-01 RX ADMIN — SODIUM CHLORIDE, PRESERVATIVE FREE 10 ML: 5 INJECTION INTRAVENOUS at 19:34

## 2021-01-01 RX ADMIN — OXYCODONE HYDROCHLORIDE AND ACETAMINOPHEN 1 TABLET: 10; 325 TABLET ORAL at 20:29

## 2021-01-01 RX ADMIN — MAGNESIUM HYDROXIDE 30 ML: 400 SUSPENSION ORAL at 08:37

## 2021-01-01 RX ADMIN — OXYCODONE HYDROCHLORIDE AND ACETAMINOPHEN 1 TABLET: 10; 325 TABLET ORAL at 07:58

## 2021-01-01 RX ADMIN — SODIUM CHLORIDE, PRESERVATIVE FREE 10 ML: 5 INJECTION INTRAVENOUS at 20:26

## 2021-01-01 RX ADMIN — LORAZEPAM 2 MG: 2 INJECTION INTRAMUSCULAR; INTRAVENOUS at 17:39

## 2021-01-01 RX ADMIN — INSULIN LISPRO 1 UNITS: 100 INJECTION, SOLUTION INTRAVENOUS; SUBCUTANEOUS at 18:05

## 2021-01-01 RX ADMIN — SODIUM CHLORIDE, PRESERVATIVE FREE 10 ML: 5 INJECTION INTRAVENOUS at 20:27

## 2021-01-01 RX ADMIN — POTASSIUM CHLORIDE 10 MEQ: 7.46 INJECTION, SOLUTION INTRAVENOUS at 17:13

## 2021-01-01 RX ADMIN — SODIUM CHLORIDE, PRESERVATIVE FREE 10 ML: 5 INJECTION INTRAVENOUS at 03:41

## 2021-01-01 RX ADMIN — POTASSIUM CHLORIDE 10 MEQ: 750 TABLET, EXTENDED RELEASE ORAL at 20:27

## 2021-01-01 RX ADMIN — SODIUM CHLORIDE: 9 INJECTION, SOLUTION INTRAVENOUS at 10:52

## 2021-01-01 RX ADMIN — PREGABALIN 150 MG: 150 CAPSULE ORAL at 07:45

## 2021-01-01 RX ADMIN — DEXTROSE MONOHYDRATE 100 MG: 50 INJECTION, SOLUTION INTRAVENOUS at 22:47

## 2021-01-01 RX ADMIN — SODIUM CHLORIDE, PRESERVATIVE FREE 10 ML: 5 INJECTION INTRAVENOUS at 07:40

## 2021-01-01 RX ADMIN — BISACODYL 10 MG: 10 SUPPOSITORY RECTAL at 09:04

## 2021-01-01 RX ADMIN — METOPROLOL TARTRATE 5 MG: 5 INJECTION INTRAVENOUS at 00:42

## 2021-01-01 RX ADMIN — PREGABALIN 150 MG: 150 CAPSULE ORAL at 08:13

## 2021-01-01 RX ADMIN — DEXTROSE MONOHYDRATE 1000 MG: 50 INJECTION, SOLUTION INTRAVENOUS at 16:25

## 2021-01-01 RX ADMIN — VALPROATE SODIUM 1000 MG: 100 INJECTION, SOLUTION INTRAVENOUS at 14:50

## 2021-01-01 RX ADMIN — HEPARIN SODIUM 2000 UNITS: 1000 INJECTION INTRAVENOUS; SUBCUTANEOUS at 11:54

## 2021-01-01 RX ADMIN — FAMOTIDINE 20 MG: 10 INJECTION, SOLUTION INTRAVENOUS at 07:45

## 2021-01-01 RX ADMIN — SODIUM CHLORIDE: 9 INJECTION, SOLUTION INTRAVENOUS at 11:06

## 2021-01-01 RX ADMIN — SODIUM CHLORIDE, PRESERVATIVE FREE 10 ML: 5 INJECTION INTRAVENOUS at 08:23

## 2021-01-01 RX ADMIN — SODIUM CHLORIDE, PRESERVATIVE FREE 10 ML: 5 INJECTION INTRAVENOUS at 20:51

## 2021-01-01 RX ADMIN — FAMOTIDINE 20 MG: 10 INJECTION, SOLUTION INTRAVENOUS at 08:04

## 2021-01-01 RX ADMIN — VALPROATE SODIUM 1000 MG: 100 INJECTION, SOLUTION INTRAVENOUS at 20:16

## 2021-01-01 RX ADMIN — BISACODYL 10 MG: 10 SUPPOSITORY RECTAL at 21:32

## 2021-01-01 RX ADMIN — VALPROATE SODIUM 1000 MG: 100 INJECTION, SOLUTION INTRAVENOUS at 04:00

## 2021-01-01 RX ADMIN — METOPROLOL TARTRATE 5 MG: 5 INJECTION INTRAVENOUS at 13:16

## 2021-01-01 RX ADMIN — POTASSIUM CHLORIDE 10 MEQ: 7.46 INJECTION, SOLUTION INTRAVENOUS at 05:05

## 2021-01-01 RX ADMIN — ROCURONIUM BROMIDE 40 MG: 10 SOLUTION INTRAVENOUS at 11:26

## 2021-01-01 RX ADMIN — METOPROLOL TARTRATE 5 MG: 5 INJECTION INTRAVENOUS at 00:36

## 2021-01-01 RX ADMIN — HYDROMORPHONE HYDROCHLORIDE 1 MG: 1 INJECTION, SOLUTION INTRAMUSCULAR; INTRAVENOUS; SUBCUTANEOUS at 12:25

## 2021-01-01 RX ADMIN — LORAZEPAM 2 MG: 2 INJECTION INTRAMUSCULAR; INTRAVENOUS at 19:09

## 2021-01-01 RX ADMIN — INSULIN LISPRO 1 UNITS: 100 INJECTION, SOLUTION INTRAVENOUS; SUBCUTANEOUS at 21:39

## 2021-01-01 RX ADMIN — METOPROLOL TARTRATE 5 MG: 5 INJECTION INTRAVENOUS at 14:14

## 2021-01-01 RX ADMIN — ENOXAPARIN SODIUM 40 MG: 40 INJECTION SUBCUTANEOUS at 08:27

## 2021-01-01 RX ADMIN — OXYMETAZOLINE HCL 2 SPRAY: 0.05 SPRAY NASAL at 16:17

## 2021-01-01 RX ADMIN — DEXTROSE MONOHYDRATE 1000 MG: 50 INJECTION, SOLUTION INTRAVENOUS at 05:05

## 2021-01-01 RX ADMIN — VALPROATE SODIUM 500 MG: 100 INJECTION, SOLUTION INTRAVENOUS at 02:58

## 2021-01-01 RX ADMIN — INSULIN LISPRO 2 UNITS: 100 INJECTION, SOLUTION INTRAVENOUS; SUBCUTANEOUS at 12:16

## 2021-01-01 RX ADMIN — VALPROATE SODIUM 500 MG: 100 INJECTION, SOLUTION INTRAVENOUS at 19:56

## 2021-01-01 RX ADMIN — FOSPHENYTOIN SODIUM 600 MG PE: 50 INJECTION, SOLUTION INTRAMUSCULAR; INTRAVENOUS at 11:50

## 2021-01-01 RX ADMIN — OXYMETAZOLINE HCL 2 SPRAY: 0.05 SPRAY NASAL at 08:22

## 2021-01-01 RX ADMIN — METOPROLOL TARTRATE 5 MG: 5 INJECTION INTRAVENOUS at 20:16

## 2021-01-01 RX ADMIN — ZIPRASIDONE MESYLATE 20 MG: 20 INJECTION, POWDER, LYOPHILIZED, FOR SOLUTION INTRAMUSCULAR at 11:34

## 2021-01-01 RX ADMIN — BISACODYL 10 MG: 10 SUPPOSITORY RECTAL at 08:22

## 2021-01-01 RX ADMIN — SODIUM CHLORIDE, PRESERVATIVE FREE 10 ML: 5 INJECTION INTRAVENOUS at 22:57

## 2021-01-01 RX ADMIN — METOPROLOL TARTRATE 5 MG: 5 INJECTION INTRAVENOUS at 17:30

## 2021-01-01 RX ADMIN — SODIUM CHLORIDE, PRESERVATIVE FREE 10 ML: 5 INJECTION INTRAVENOUS at 09:49

## 2021-01-01 RX ADMIN — INSULIN LISPRO 1 UNITS: 100 INJECTION, SOLUTION INTRAVENOUS; SUBCUTANEOUS at 08:38

## 2021-01-01 RX ADMIN — PHENYLEPHRINE HYDROCHLORIDE 200 MCG: 10 INJECTION INTRAVENOUS at 11:32

## 2021-01-01 RX ADMIN — HYDRALAZINE HYDROCHLORIDE 5 MG: 20 INJECTION INTRAMUSCULAR; INTRAVENOUS at 17:06

## 2021-01-01 RX ADMIN — DEXTROSE MONOHYDRATE 100 MG: 50 INJECTION, SOLUTION INTRAVENOUS at 21:57

## 2021-01-01 RX ADMIN — PREGABALIN 150 MG: 150 CAPSULE ORAL at 20:29

## 2021-01-01 RX ADMIN — METHYLNALTREXONE BROMIDE 6 MG: 12 INJECTION, SOLUTION SUBCUTANEOUS at 22:37

## 2021-01-01 RX ADMIN — BISACODYL 10 MG: 10 SUPPOSITORY RECTAL at 12:08

## 2021-01-01 RX ADMIN — HYDROMORPHONE HYDROCHLORIDE 1 MG: 1 INJECTION, SOLUTION INTRAMUSCULAR; INTRAVENOUS; SUBCUTANEOUS at 13:11

## 2021-01-01 RX ADMIN — VALPROATE SODIUM 1000 MG: 100 INJECTION, SOLUTION INTRAVENOUS at 20:37

## 2021-01-01 RX ADMIN — METOPROLOL TARTRATE 5 MG: 5 INJECTION INTRAVENOUS at 06:25

## 2021-01-01 RX ADMIN — POTASSIUM CHLORIDE 10 MEQ: 750 TABLET, EXTENDED RELEASE ORAL at 08:28

## 2021-01-01 RX ADMIN — HYDRALAZINE HYDROCHLORIDE 5 MG: 20 INJECTION INTRAMUSCULAR; INTRAVENOUS at 12:29

## 2021-01-01 RX ADMIN — SODIUM CHLORIDE, PRESERVATIVE FREE 10 ML: 5 INJECTION INTRAVENOUS at 07:59

## 2021-01-01 RX ADMIN — ENOXAPARIN SODIUM 100 MG: 100 INJECTION SUBCUTANEOUS at 07:58

## 2021-01-01 RX ADMIN — ASPIRIN 81 MG: 81 TABLET, CHEWABLE ORAL at 08:28

## 2021-01-01 RX ADMIN — METOPROLOL TARTRATE 5 MG: 5 INJECTION INTRAVENOUS at 12:03

## 2021-01-01 RX ADMIN — DEXTROSE MONOHYDRATE 100 MG: 50 INJECTION, SOLUTION INTRAVENOUS at 10:17

## 2021-01-01 RX ADMIN — SODIUM CHLORIDE: 9 INJECTION, SOLUTION INTRAVENOUS at 01:55

## 2021-01-01 RX ADMIN — VALPROATE SODIUM 1000 MG: 100 INJECTION, SOLUTION INTRAVENOUS at 12:03

## 2021-01-01 RX ADMIN — METOPROLOL TARTRATE 5 MG: 5 INJECTION INTRAVENOUS at 06:42

## 2021-01-01 RX ADMIN — SODIUM CHLORIDE: 9 INJECTION, SOLUTION INTRAVENOUS at 07:45

## 2021-01-01 RX ADMIN — DEXTROSE MONOHYDRATE 100 MG: 50 INJECTION, SOLUTION INTRAVENOUS at 10:02

## 2021-01-01 ASSESSMENT — PAIN DESCRIPTION - PROGRESSION
CLINICAL_PROGRESSION: NOT CHANGED
CLINICAL_PROGRESSION: RAPIDLY WORSENING
CLINICAL_PROGRESSION: NOT CHANGED

## 2021-01-01 ASSESSMENT — PAIN SCALES - GENERAL
PAINLEVEL_OUTOF10: 8
PAINLEVEL_OUTOF10: 1
PAINLEVEL_OUTOF10: 0
PAINLEVEL_OUTOF10: 0
PAINLEVEL_OUTOF10: 6
PAINLEVEL_OUTOF10: 0
PAINLEVEL_OUTOF10: 8
PAINLEVEL_OUTOF10: 6
PAINLEVEL_OUTOF10: 0
PAINLEVEL_OUTOF10: 10
PAINLEVEL_OUTOF10: 0
PAINLEVEL_OUTOF10: 0
PAINLEVEL_OUTOF10: 8
PAINLEVEL_OUTOF10: 3
PAINLEVEL_OUTOF10: 0
PAINLEVEL_OUTOF10: 6
PAINLEVEL_OUTOF10: 2
PAINLEVEL_OUTOF10: 3
PAINLEVEL_OUTOF10: 0
PAINLEVEL_OUTOF10: 2
PAINLEVEL_OUTOF10: 0
PAINLEVEL_OUTOF10: 9
PAINLEVEL_OUTOF10: 6
PAINLEVEL_OUTOF10: 0
PAINLEVEL_OUTOF10: 8
PAINLEVEL_OUTOF10: 8
PAINLEVEL_OUTOF10: 0
PAINLEVEL_OUTOF10: 0
PAINLEVEL_OUTOF10: 8
PAINLEVEL_OUTOF10: 2
PAINLEVEL_OUTOF10: 7
PAINLEVEL_OUTOF10: 0
PAINLEVEL_OUTOF10: 0
PAINLEVEL_OUTOF10: 2
PAINLEVEL_OUTOF10: 0
PAINLEVEL_OUTOF10: 8
PAINLEVEL_OUTOF10: 8
PAINLEVEL_OUTOF10: 0
PAINLEVEL_OUTOF10: 8
PAINLEVEL_OUTOF10: 6
PAINLEVEL_OUTOF10: 0
PAINLEVEL_OUTOF10: 0
PAINLEVEL_OUTOF10: 2
PAINLEVEL_OUTOF10: 8
PAINLEVEL_OUTOF10: 8
PAINLEVEL_OUTOF10: 10
PAINLEVEL_OUTOF10: 8
PAINLEVEL_OUTOF10: 9
PAINLEVEL_OUTOF10: 0
PAINLEVEL_OUTOF10: 2
PAINLEVEL_OUTOF10: 0
PAINLEVEL_OUTOF10: 6
PAINLEVEL_OUTOF10: 5
PAINLEVEL_OUTOF10: 2
PAINLEVEL_OUTOF10: 0
PAINLEVEL_OUTOF10: 0

## 2021-01-01 ASSESSMENT — PAIN DESCRIPTION - PAIN TYPE
TYPE: CHRONIC PAIN
TYPE: ACUTE PAIN;CHRONIC PAIN
TYPE: CHRONIC PAIN
TYPE: ACUTE PAIN;CHRONIC PAIN
TYPE: ACUTE PAIN
TYPE: CHRONIC PAIN
TYPE: CHRONIC PAIN;ACUTE PAIN
TYPE: CHRONIC PAIN

## 2021-01-01 ASSESSMENT — PAIN - FUNCTIONAL ASSESSMENT
PAIN_FUNCTIONAL_ASSESSMENT: PREVENTS OR INTERFERES SOME ACTIVE ACTIVITIES AND ADLS
PAIN_FUNCTIONAL_ASSESSMENT: PREVENTS OR INTERFERES WITH MANY ACTIVE NOT PASSIVE ACTIVITIES
PAIN_FUNCTIONAL_ASSESSMENT: PREVENTS OR INTERFERES SOME ACTIVE ACTIVITIES AND ADLS
PAIN_FUNCTIONAL_ASSESSMENT: PREVENTS OR INTERFERES WITH ALL ACTIVE AND SOME PASSIVE ACTIVITIES
PAIN_FUNCTIONAL_ASSESSMENT: PREVENTS OR INTERFERES SOME ACTIVE ACTIVITIES AND ADLS
PAIN_FUNCTIONAL_ASSESSMENT: PREVENTS OR INTERFERES SOME ACTIVE ACTIVITIES AND ADLS

## 2021-01-01 ASSESSMENT — PAIN DESCRIPTION - FREQUENCY
FREQUENCY: INTERMITTENT
FREQUENCY: CONTINUOUS
FREQUENCY: INTERMITTENT
FREQUENCY: CONTINUOUS
FREQUENCY: INTERMITTENT

## 2021-01-01 ASSESSMENT — PAIN DESCRIPTION - ONSET
ONSET: ON-GOING
ONSET: UNABLE TO TELL
ONSET: ON-GOING

## 2021-01-01 ASSESSMENT — PAIN DESCRIPTION - ORIENTATION
ORIENTATION: RIGHT;LEFT
ORIENTATION: RIGHT
ORIENTATION: RIGHT;LEFT
ORIENTATION: RIGHT
ORIENTATION: RIGHT;LEFT
ORIENTATION: RIGHT
ORIENTATION: RIGHT;LEFT
ORIENTATION: RIGHT
ORIENTATION: RIGHT;LEFT
ORIENTATION: RIGHT
ORIENTATION: RIGHT

## 2021-01-01 ASSESSMENT — PAIN DESCRIPTION - LOCATION
LOCATION: ANKLE
LOCATION: HIP
LOCATION: BACK;HIP;LEG
LOCATION: HIP
LOCATION: BACK;LEG
LOCATION: LEG
LOCATION: BACK
LOCATION: HIP
LOCATION: HIP
LOCATION: BACK
LOCATION: HIP
LOCATION: BACK;LEG
LOCATION: HIP
LOCATION: BACK
LOCATION: HIP
LOCATION: GENERALIZED
LOCATION: BACK
LOCATION: BACK;KNEE

## 2021-01-01 ASSESSMENT — PAIN DESCRIPTION - DESCRIPTORS
DESCRIPTORS: SHARP;CONSTANT
DESCRIPTORS: DULL;CONSTANT
DESCRIPTORS: ACHING
DESCRIPTORS: SHARP;SHOOTING
DESCRIPTORS: SHARP;CONSTANT
DESCRIPTORS: SHARP;DULL
DESCRIPTORS: PATIENT UNABLE TO DESCRIBE
DESCRIPTORS: ACHING
DESCRIPTORS: ACHING;BURNING
DESCRIPTORS: ACHING
DESCRIPTORS: ACHING;BURNING
DESCRIPTORS: ACHING;BURNING
DESCRIPTORS: ACHING
DESCRIPTORS: ACHING
DESCRIPTORS: SHARP;ACHING
DESCRIPTORS: DULL;SHARP

## 2021-01-01 ASSESSMENT — LIFESTYLE VARIABLES: SMOKING_STATUS: 0

## 2021-01-01 ASSESSMENT — ENCOUNTER SYMPTOMS
SHORTNESS OF BREATH: 0
NAUSEA: 0
BACK PAIN: 1
GASTROINTESTINAL NEGATIVE: 1
RESPIRATORY NEGATIVE: 1
EYES NEGATIVE: 1
SHORTNESS OF BREATH: 1
PHOTOPHOBIA: 0
VOMITING: 0
COUGH: 0
SHORTNESS OF BREATH: 1
COUGH: 1

## 2021-01-01 ASSESSMENT — PAIN SCALES - WONG BAKER
WONGBAKER_NUMERICALRESPONSE: 4
WONGBAKER_NUMERICALRESPONSE: 2
WONGBAKER_NUMERICALRESPONSE: 0
WONGBAKER_NUMERICALRESPONSE: 2
WONGBAKER_NUMERICALRESPONSE: 0

## 2021-05-24 NOTE — TELEPHONE ENCOUNTER
Called pt to offer new pt appt with Dr Owen today but pt declined due to no transportation on this short notice.  Informed pt we would keep her on the list for another appt.  Pt voiced understanding/kahm

## 2021-05-27 NOTE — TELEPHONE ENCOUNTER
Called pt and offered appt on 6-2-21 at 11am with Dr Owen.  Pt declined stating she did not have enough time to arrange transport and requested appt further in the future.  Appt sched for 6-23-21 at 11am with Dr Owen per pt request.  New pt packet sent to pt/candido

## 2021-05-29 PROBLEM — I63.9 CVA (CEREBRAL VASCULAR ACCIDENT) (HCC): Status: ACTIVE | Noted: 2021-01-01

## 2021-05-29 PROBLEM — G81.94 LEFT HEMIPLEGIA (HCC): Status: ACTIVE | Noted: 2021-01-01

## 2021-05-29 PROBLEM — F11.20 NARCOTIC HABITUATION, CONTINUOUS (HCC): Status: ACTIVE | Noted: 2021-01-01

## 2021-05-29 PROBLEM — R06.02 SHORTNESS OF BREATH: Status: ACTIVE | Noted: 2021-01-01

## 2021-05-29 PROBLEM — N28.9 NEPHROPATHY: Status: ACTIVE | Noted: 2021-01-01

## 2021-05-29 PROBLEM — E11.69 HYPERLIPIDEMIA ASSOCIATED WITH TYPE 2 DIABETES MELLITUS (HCC): Status: ACTIVE | Noted: 2021-01-01

## 2021-05-29 PROBLEM — I10 HYPERTENSION: Status: ACTIVE | Noted: 2021-01-01

## 2021-05-29 PROBLEM — J44.9 COPD (CHRONIC OBSTRUCTIVE PULMONARY DISEASE) (HCC): Status: ACTIVE | Noted: 2021-01-01

## 2021-05-29 PROBLEM — R26.81 GAIT INSTABILITY: Status: ACTIVE | Noted: 2021-01-01

## 2021-05-29 PROBLEM — E78.5 HYPERLIPIDEMIA ASSOCIATED WITH TYPE 2 DIABETES MELLITUS (HCC): Status: ACTIVE | Noted: 2021-01-01

## 2021-05-29 PROBLEM — K08.109 EDENTULOUS: Status: ACTIVE | Noted: 2021-01-01

## 2021-05-29 PROBLEM — I25.10 CAD (CORONARY ARTERY DISEASE): Status: ACTIVE | Noted: 2021-01-01

## 2021-05-29 PROBLEM — I25.3 PATENT FORAMEN OVALE WITH ATRIAL SEPTAL ANEURYSM: Status: ACTIVE | Noted: 2021-01-01

## 2021-05-29 PROBLEM — Q21.12 PATENT FORAMEN OVALE WITH ATRIAL SEPTAL ANEURYSM: Status: ACTIVE | Noted: 2021-01-01

## 2021-05-29 PROBLEM — E66.01 MORBID OBESITY (HCC): Status: ACTIVE | Noted: 2021-01-01

## 2021-05-29 PROBLEM — R06.00 DYSPNEA: Status: ACTIVE | Noted: 2021-01-01

## 2021-05-29 PROBLEM — I69.954 HEMIPLEGIA OF LEFT NONDOMINANT SIDE AS LATE EFFECT OF CEREBROVASCULAR DISEASE (HCC): Status: ACTIVE | Noted: 2021-01-01

## 2021-05-29 PROBLEM — N18.30 CKD (CHRONIC KIDNEY DISEASE) STAGE 3, GFR 30-59 ML/MIN (HCC): Status: ACTIVE | Noted: 2021-01-01

## 2021-05-29 PROBLEM — G62.9 NEUROPATHY: Status: ACTIVE | Noted: 2021-01-01

## 2021-05-29 PROBLEM — R29.6 FALLS FREQUENTLY: Status: ACTIVE | Noted: 2021-01-01

## 2021-05-29 PROBLEM — Z95.5 HISTORY OF HEART ARTERY STENT: Status: ACTIVE | Noted: 2021-01-01

## 2021-05-29 NOTE — PLAN OF CARE
Problem: Falls - Risk of:  Goal: Will remain free from falls  Description: Will remain free from falls  5/29/2021 1600 by Fabio Pandey LPN  Outcome: Ongoing  5/29/2021 0314 by Franco Lane LPN  Outcome: Ongoing  Goal: Absence of physical injury  Description: Absence of physical injury  5/29/2021 1600 by Fabio Pandey LPN  Outcome: Ongoing  5/29/2021 0314 by Franco Lane LPN  Outcome: Ongoing     Problem: Skin Integrity:  Goal: Will show no infection signs and symptoms  Description: Will show no infection signs and symptoms  5/29/2021 1600 by Fabio Pandey LPN  Outcome: Ongoing  5/29/2021 0314 by Franco Lane LPN  Outcome: Ongoing  Goal: Absence of new skin breakdown  Description: Absence of new skin breakdown  5/29/2021 1600 by Fabio Pandey LPN  Outcome: Ongoing  5/29/2021 0314 by Franco Lane LPN  Outcome: Ongoing     Problem: HEMODYNAMIC STATUS  Goal: Patient has stable vital signs and fluid balance  5/29/2021 1600 by Fabio Pandey LPN  Outcome: Ongoing  5/29/2021 0314 by Franco Lane LPN  Outcome: Ongoing     Problem: ACTIVITY INTOLERANCE/IMPAIRED MOBILITY  Goal: Mobility/activity is maintained at optimum level for patient  5/29/2021 1600 by Fabio Pandey LPN  Outcome: Ongoing  5/29/2021 0314 by Franco Lane LPN  Outcome: Ongoing     Problem: COMMUNICATION IMPAIRMENT  Goal: Ability to express needs and understand communication  5/29/2021 1600 by Fabio Pandey LPN  Outcome: Ongoing  5/29/2021 0314 by Franco Lane LPN  Outcome: Ongoing

## 2021-05-29 NOTE — PROGRESS NOTES
Speech Language Pathology  Facility/Department: [facility] PROGRESSIVE CARE   CLINICAL BEDSIDE SWALLOW EVALUATION    NAME: Tram Jackson  : 1955  MRN: [id number]  ADMISSION DATE: 2021   Date of Eval: 2021  Evaluating Therapist: Bonilla Small, SLP      ADMITTING DIAGNOSIS:   has Hemiplegia of left nondominant side as late effect of cerebrovascular disease ([address] Hypertension; CAD (coronary artery disease); History of heart artery stent; COPD (chronic obstructive pulmonary disease) ([address] Dyspnea; Shortness of breath; Falls frequently; Gait instability; Hyperlipidemia associated with type 2 diabetes mellitus ([address] Morbid obesity ([address] CKD (chronic kidney disease) stage 3, GFR 30-59 ml/min; Narcotic habituation, continuous ([address] Edentulous; CVA (cerebral vascular accident) ([address] Patent foramen ovale with atrial septal aneurysm; Left hemiplegia ([address] Nephropathy; and Neuropathy on their problem list.        Current Diet level:  Current Diet : NPO  Current Liquid Diet : NPO    Reason for Referral  Tram Jackson was referred for a bedside swallow evaluation to assess the efficiency of her swallow function, identify signs and symptoms of aspiration and make recommendations regarding safe dietary consistencies, effective compensatory strategies, and safe eating environment. Impression  Dysphagia Impression : Pt presented with mild oral phase dysphagia as characterized by mastication process. No overt s/s of aspiration noted. 1x delayed throat clear without PO presentation noted at rest.   Pt is recommended to trial soft and bite-sized diet texture with thin liquids. Medications whole with water. Dysphagia Diagnosis: Mild oral stage dysphagia    Dysphagia Outcome Severity Scale: Level 5: Mild dysphagia- Distant supervision. May need one diet consistency restricted     Treatment Plan  Requires SLP Intervention: Yes  Duration/Frequency of Treatment: 1x/day for 2-3 days  D/C Recommendations:  To be determined       Recommended Diet and Intervention  Diet Solids Recommendation: Dysphagia Soft and Bite-Sized (Dysphagia III)  Liquid Consistency Recommendation: Thin  Recommended Form of Meds: Whole with water     Therapeutic Interventions: Diet tolerance monitoring;Oral care; Patient/Family education    Compensatory Swallowing Strategies  Compensatory Swallowing Strategies: Upright as possible for all oral intake; Check for pocketing of food on the Left; Check for pocketing of food on the Right;Lingual sweep    Treatment/Goals  Short-term Goals  Timeframe for Short-term Goals: 2-3 days  Goal 1: Pt tolerate soft and bite-sized diet texture with thin liquids without overt s/s of aspiration 100% of PO intake. Goal 2: Pt/family/staff follow safe swallow/aspiration precautions 100% of opportunitites  Goal 3: Pt/family/staff to complete oral care routine to reduce oral bacteria. Goal 4: Swallow re-assessment for potential upgrade  Goal 5: Potential speech/language/cognitive evaluation    General  Chart Reviewed: Yes  Subjective  Subjective: Pt alert and cooperative upon SLP entry. Pt ready for trials of PO  Behavior/Cognition: Alert; Cooperative;Pleasant mood  Temperature Spikes Noted: No  Respiratory Status: Room air  Breath Sounds: Clear  O2 Device: None (Room air)  Communication Observation: Functional  Follows Directions: Simple  Dentition: Poor dental/oral hygeine  Patient Positioning: Upright in bed  Baseline Vocal Quality: Normal  Prior Dysphagia History: No recent dysphagia. No MBSS in chart. Consistencies Administered: Reg solid; Dysphagia Soft and Bite-Sized (Dysphagia III); Thin - cup; Ice Chips        Oral Phase Dysfunction  Oral Phase  Oral Phase: WFL  Oral Phase  Oral Phase - Comment: Pt's oral phase was observed with functional movement and coordination. However, due to poor dentition and oral care, limited teeth impaired mastication process. Pt demonstrated prolonged mastication.  Bolus coordination and formation was adequate, but may be impaired with fatigue. Indicators of Pharyngeal Phase Dysfunction   Pharyngeal Phase  Pharyngeal Phase: WFL  Pharyngeal Phase   Pharyngeal: Pharyngeal phase of swallowing presented WFL at the bedside. Laryngeal palpation revealed minimally decreased LE. 3oz water test completed without difficulty. Pt had one time delayed throat clear during evaluation. However, no other overt s/s of aspiration noted.     Prognosis  Individuals consulted  Consulted and agree with results and recommendations: Patient;RN    Education  Patient Education: Review of recommended diet and liquid was educated  Patient Education Response: Verbalizes understanding       ANNABELLA Ruiz  5/29/2021 1:58 PM      Electronically signed by Suzy Callahan on 5/29/21 at 2:03 PM CDT

## 2021-05-29 NOTE — H&P
HISTORY AND PHYSICAL             Date: 5/29/2021        Patient Name: Martha Saini     YOB: 1955      Age:  63 y.o. Chief Complaint   Patient transferred here from [facility]. She was seen there for slurred speech and dysarthria with left hemiplegia. This is a recurrent stroke sx for her, she was seen there three weeks ago, with right hemiplegia and expressive aphasia and right hand numbness. Transferred here for higher level of care for cardiology and neurology evaluation and coordination of care. Please see records sent with her, they have concise data and information about what transpired and testing that was achieved and neuro evaluation by Dr. Derrick Lyons, neurology at [facility].    Chief complaint:  Acute onset slurred speech and left hemiplegia     History Obtained From   Patient and old chart sent with her. History of Present Illness   This is a 63year old female with multiple chronic medical problems and has been debilitated for sometime now. She lives with son who helps with   Adl's and care. She was admitted three weeks ago 4/28/2021  With expressive aphasia, dysarthria, facial droop and right hemiplegia as well. Abnormal mri finding of right  frontal lesion, and left parietal lesion   And appears that these were two separate events that occurred and probably consistent with ischemia and embolic events. RAMON and transthoracic echo obtained and she had a patent foramen ovale with atrial septal anuerysm noted and no thrombus. She would be considered a candidate for PFO closure and was set up to be seen outpatient. Unfortunately, she has sustained another event tonight with left sided hemiplegia, dysarthria, and facial droop. Dysarthria has resolved to some extent. Facial droop improved. She did not get TPA due having had a stroke recent three weeks ago. She is sent here for cardiology and neurology evaluation.      She has slurred speech but understandable and coherent when she answers questions. Slow speech. No nausea, no vomiting , no diarrhea, no uti, no cough, no bronchitis and no other infectious sx. For the last 10 years has had leukocytosis and peripheral smear was evaluated and she has findings consistent with myelodysplasia. Past Medical History     Past Medical History:   Diagnosis Date    [facility]     COPD (chronic obstructive pulmonary disease) ([facility])     Diabetes mellitus [facility]     Hypertension     [facility]     renal failure   ckd   Narcotic withdrawal   Heart disease   Cardiac stents in place and on asa and plavix  Gastric resection  Copd used to smoke quit several years ago. Splenectomy   MI      Past Surgical History     Past Surgical History:   Procedure Laterality Date    ABDOMEN SURGERY      gastric resection    APPENDECTOMY      CARDIAC SURGERY       SECTION      x2    CHOLECYSTECTOMY      CORONARY ANGIOPLASTY      SPLENECTOMY          Medications Prior to Admission     Prior to Admission medications    Medication Sig Start Date End Date Taking? Authorizing Provider   glipiZIDE (GLUCOTROL XL) 5 MG extended release tablet Take 5 mg by mouth daily   Yes Historical Provider, MD   furosemide (LASIX) 20 MG tablet Take 20 mg by mouth daily   Yes Historical Provider, MD   losartan (COZAAR) 100 MG tablet Take 100 mg by mouth daily   Yes Historical Provider, MD   oxyCODONE-acetaminophen (PERCOCET)  MG per tablet Take 1 tablet by mouth every 6 hours as needed for Pain. Yes Historical Provider, MD   potassium chloride (MICRO-K) 10 MEQ extended release capsule Take 10 mEq by mouth 2 times daily   Yes Historical Provider, MD   pregabalin (LYRICA) 150 MG capsule Take 150 mg by mouth 2 times daily.    Yes Historical Provider, MD        Allergies   Ibuprofen, Metformin, Hydrocodone, and Nsaids    Social History     Social History Tobacco History     Smoking Status  Former Smoker Quit date  1/1/2019 Smoking Frequency  1 pack/day for 28 years (28 pk yrs) Smoking Tobacco Type  Cigarettes    Smokeless Tobacco Use  Never Used          Alcohol History     Alcohol Use Status  Not Currently          Drug Use     Drug Use Status  Never          Sexual Activity     Sexually Active  Not Currently                Family History   Non contributory     Review of Systems   Review of Systems   Constitutional: Positive for activity change. HENT: Negative. Eyes: Negative. Respiratory: Positive for cough and shortness of breath. Cardiovascular: Positive for chest pain, palpitations and leg swelling. Gastrointestinal: Negative. Endocrine: Negative. Genitourinary: Negative. Musculoskeletal: Positive for arthralgias, back pain, joint swelling and myalgias. Neurological: Positive for dizziness, facial asymmetry, speech difficulty and weakness. Left hemiplegia   Left arm and leg weakness as compared to the right   Both sides are weak though    Hematological: Negative. Psychiatric/Behavioral: The patient is nervous/anxious. All other systems reviewed and are negative. Physical Exam   BP (!) 170/100   Pulse 72   Temp 97.7 °F (36.5 °C) (Temporal)   Resp 16   Ht 5' 5\" (1.651 m)   Wt 220 lb 3.2 oz (99.9 kg)   SpO2 94%   BMI 36.64 kg/m²     Physical Exam  Vitals and nursing note reviewed. Constitutional:       General: She is in acute distress. Appearance: She is obese. She is ill-appearing. HENT:      Head: Normocephalic and atraumatic. Nose: Nose normal.      Mouth/Throat:      Comments: Poor dentition  Edentulous   Eyes:      Extraocular Movements: Extraocular movements intact. Conjunctiva/sclera: Conjunctivae normal.   Cardiovascular:      Rate and Rhythm: Normal rate and regular rhythm. Pulses: Normal pulses. Heart sounds: Normal heart sounds. Pulmonary:      Breath sounds: Rales present.

## 2021-05-29 NOTE — CONSULTS
[facility]  Cardiology Consult      Requesting MD:  Davian Almeida MD   Admit Status:  Inpatient [101]       History obtained from:   [] Patient  [] Other (specify):     Patient:  Gary Jarvis  [id number]     Chief Complaint: No chief complaint on file. HPI:  This is a 63year old female with multiple chronic medical problems and has been debilitated for sometime now. She lives with son who helps with   Adl's and care.      She was admitted three weeks ago 4/28/2021  With expressive aphasia, dysarthria, facial droop and right hemiplegia as well. Abnormal mri finding of right  frontal lesion, and left parietal lesion   And appears that these were two separate events that occurred and probably consistent with ischemia and embolic events. RAMON and transthoracic echo obtained and she had a patent foramen ovale with atrial septal anuerysm noted and no thrombus. She would be considered a candidate for PFO closure and was set up to be seen outpatient. Unfortunately, she has sustained another event tonight with left sided hemiplegia, dysarthria, and facial droop. Dysarthria has resolved to some extent. Facial droop improved. She did not get TPA due having had a stroke recent three weeks ago.         No chest pain. Review of Systems:  Review of Systems   Constitutional: Negative. HENT: Negative. Eyes: Negative. Respiratory: Negative. Cardiovascular: Negative. Gastrointestinal: Negative. Endocrine: Negative. Genitourinary: Negative. Musculoskeletal: Negative. Neurological: Positive for dizziness, weakness, light-headedness and numbness. Physical Exam  Vitals and nursing note reviewed. Constitutional:       General: resting     Appearance: She is obese. HENT:      Head: Normocephalic and atraumatic. Nose: Nose normal.      Mouth/Throat:      Comments: Poor dentition  Edentulous   Eyes:      Extraocular Movements: Extraocular movements intact. Conjunctiva/sclera: Conjunctivae normal.   Cardiovascular:      Rate and Rhythm: Normal rate and regular rhythm. Pulses: Normal pulses. Heart sounds: Normal heart sounds. Pulmonary:      Breath sounds: Rales present. Abdominal:      Comments: Obese   Non distended  Non tender  No rebound no guarding    Musculoskeletal:      Right lower leg: Edema present. Left lower leg: Edema present. Skin:     General: Skin is warm. Neurological:      Comments: Left sided hemiplegia     Cardiac Specific Data:  Specialty Problems        Cardiology Problems    CAD (coronary artery disease)        CVA (cerebral vascular accident) [facility]        Hypertension        Patent foramen ovale with atrial septal aneurysm              Past Medical History:  Past Medical History:   Diagnosis Date    Cerebral artery occlusion with cerebral infarction ([facility]     COPD (chronic obstructive pulmonary disease) ([facility]     [facility]     Hypertension     Neuromuscular disorder ([facility]         Past Surgical History:  Past Surgical History:   Procedure Laterality Date    ABDOMEN SURGERY      gastric resection    APPENDECTOMY      CARDIAC SURGERY       SECTION      x2    CHOLECYSTECTOMY      CORONARY ANGIOPLASTY      SPLENECTOMY         Past Family History:  No family history on file. Past Social History:  Social History     Socioeconomic History    Marital status:       Spouse name: Not on file    Number of children: Not on file    Years of education: Not on file    Highest education level: Not on file   Occupational History    Not on file   Tobacco Use    Smoking status: Former Smoker     Packs/day: 1.00     Years: 28.00     Pack years: 28.00     Types: Cigarettes     Quit date: 2019     Years since quittin.4    Smokeless tobacco: Never Used   Vaping Use    Vaping Use: Never used   Substance and Sexual Activity    Alcohol use: Not Currently    Drug use: Never    Sexual activity: Not Currently   Other Topics Concern    Not on file   Social History Narrative    Not on file     Social Determinants of Health     Financial Resource Strain:     Difficulty of Paying Living Expenses:    Food Insecurity:     Worried About Running Out of Food in the Last Year:     [address] in the Last Year:    Transportation Needs:     Lack of Transportation (Medical):  Lack of Transportation (Non-Medical):    Physical Activity:     Days of Exercise per Week:     Minutes of Exercise per Session:    Stress:     Feeling of Stress :    Social Connections:     Frequency of Communication with Friends and Family:     Frequency of Social Gatherings with Friends and Family:     Attends [employer] Services:     Active Member of Clubs or Organizations:     Attends Club or Organization Meetings:     Marital Status:    Intimate Partner Violence:     Fear of Current or Ex-Partner:     Emotionally Abused:     Physically Abused:     Sexually Abused: Allergies: Allergies   Allergen Reactions    Ibuprofen Hives, Itching and Nausea Only    Metformin Other (See Comments)    Hydrocodone Hives and Itching    Nsaids        Home Meds:  Prior to Admission medications    Medication Sig Start Date End Date Taking? Authorizing Provider   glipiZIDE (GLUCOTROL XL) 5 MG extended release tablet Take 5 mg by mouth daily   Yes Historical Provider, MD   furosemide (LASIX) 20 MG tablet Take 20 mg by mouth daily   Yes Historical Provider, MD   losartan (COZAAR) 100 MG tablet Take 100 mg by mouth daily   Yes Historical Provider, MD   oxyCODONE-acetaminophen (PERCOCET)  MG per tablet Take 1 tablet by mouth every 6 hours as needed for Pain. Yes Historical Provider, MD   potassium chloride (MICRO-K) 10 MEQ extended release capsule Take 10 mEq by mouth 2 times daily   Yes Historical Provider, MD   pregabalin (LYRICA) 150 MG capsule Take 150 mg by mouth 2 times daily.    Yes Historical Provider, MD Current Meds:   potassium chloride  10 mEq Oral BID    pregabalin  150 mg Oral BID    [Held by provider] losartan  100 mg Oral Daily    sodium chloride flush  5-40 mL Intravenous 2 times per day    aspirin  81 mg Oral Daily    famotidine (PEPCID) injection  20 mg Intravenous BID    clopidogrel  75 mg Oral Daily    insulin lispro  0-6 Units Subcutaneous TID WC    insulin lispro  0-3 Units Subcutaneous Nightly    enoxaparin  40 mg Subcutaneous Daily       Current Infused Meds:   sodium chloride      dextrose         Physical Exam:  Vitals:    05/29/21 0659   BP: 136/83   Pulse: 78   Resp: 16   Temp: 96.8 °F (36 °C)   SpO2: 96%       Intake/Output Summary (Last 24 hours) at 5/29/2021 1029  Last data filed at 5/29/2021 0835  Gross per 24 hour   Intake 10 ml   Output 700 ml   Net -690 ml     Estimated body mass index is 36.64 kg/m² as calculated from the following:    Height as of this encounter: 5' 5\" (1.651 m). Weight as of this encounter: 220 lb 3.2 oz (99.9 kg). Physical Exam    Labs:  Recent Labs     05/29/21  0522   WBC 14.3*   HGB 14.1          Recent Labs     05/29/21  0522      K 3.7      CO2 26   BUN 13   CREATININE 1.4*   LABGLOM 38*   CALCIUM 8.9       CK, CKMB, Troponin: @LABRCNT (CKTOTAL:3, CKMB:3, TROPONINI:3)@    Last 3 BNP:  No results for input(s): BNP in the last 72 hours. IMAGING:  No results found. Assessment:  1. Recurrent stroke  2. DM  3. HTN    C/c renal insuffiency  4.  COPD      Recommendations:    Pt needs op monitor to r/o a fib  Will get Dr Andree Johnson for PFO closure  Change plavix to eliquiz give recurrent stroke an d PFo  HTN control      D/w pt

## 2021-05-29 NOTE — CONSULTS
[address], [address]  [place], [place]  Phone [phone]     Neurology Consultation     Date of Admission: 2021  2:12 AM  Date of Consultation: 21    Attending Provider: Gaetano Spring MD  Consulting Provider: Tonny Cross M.D. Patient: Con Patel  :  1955  Age:  63 y.o. MRN:  [id number]    CHIEF COMPLAINT:  Stroke    History Source: History obtained from chart review and the patient. PCP: No primary care provider on file. HISTORY OF PRESENT ILLNESS:   Con Patel is a 63y.o. year old woman with a history of hypertension, hyperlipidemia, diabetes, cardiovascular disease, and previous strokes who was transferred from [facility] ER this am after having yet another stroke. She has had two clinical strokes in the past.  She takes ASA and Plavix. On 2021 she presented to [facility] with acute right limb weakness and difficulty talking. She was admitted with a stroke. MRI head showed acute cerebral infarcts in not only the left frontal lobe but also right frontal lobe as well as chronic cortical infarcts bilaterally. There is mention of ordering CTAs but I can find no reports or mention of them. She has chronic kidney disease. Echocardiogram and RAMON showed a PFO and septal aneurysm. After spending some time recovering in the rehab unit, she went home still on Plavix and ASA. She had recovered well. She could talk nearly normally and no longer had right sided weakness. She was able to ambulate with a walker and was independent with personal care. Last night she developed left arm and leg weakness and slurred speech. She was brought to the ER at [facility] where CT head showed several old strokes. She was transferred here for further evaluation and specialist opinions. She feels a little better today. She still is very weak in her left limbs. She has some numbness in the left arm and leg as well.       PAST MEDICAL HISTORY:    Medical History: Diagnosis Date    [facility]     COPD [facility]ronic obstructive [facility] disease[facility]     Diabetes mellitus [facility]     Hypertension     Neuromuscular disorder ([facility]        Surgical History:      Procedure Laterality Date    ABDOMEN SURGERY      gastric resection    APPENDECTOMY      CARDIAC SURGERY       SECTION      x2    CHOLECYSTECTOMY      CORONARY ANGIOPLASTY      SPLENECTOMY         Medications Prior to Admission:    Prior to Admission medications    Medication Sig Start Date End Date Taking? Authorizing Provider   glipiZIDE (GLUCOTROL XL) 5 MG extended release tablet Take 5 mg by mouth daily   Yes Historical Provider, MD   furosemide (LASIX) 20 MG tablet Take 20 mg by mouth daily   Yes Historical Provider, MD   losartan (COZAAR) 100 MG tablet Take 100 mg by mouth daily   Yes Historical Provider, MD   oxyCODONE-acetaminophen (PERCOCET)  MG per tablet Take 1 tablet by mouth every 6 hours as needed for Pain. Yes Historical Provider, MD   potassium chloride (MICRO-K) 10 MEQ extended release capsule Take 10 mEq by mouth 2 times daily   Yes Historical Provider, MD   pregabalin (LYRICA) 150 MG capsule Take 150 mg by mouth 2 times daily.    Yes Historical Provider, MD       Current Medications:  Current Facility-Administered Medications: oxyCODONE-acetaminophen (PERCOCET)  MG per tablet 1 tablet, 1 tablet, Oral, Q4H PRN  potassium chloride (KLOR-CON M) extended release tablet 10 mEq, 10 mEq, Oral, BID  pregabalin (LYRICA) capsule 150 mg, 150 mg, Oral, BID  [Held by provider] losartan (COZAAR) tablet 100 mg, 100 mg, Oral, Daily  sodium chloride flush 0.9 % injection 5-40 mL, 5-40 mL, Intravenous, 2 times per day  sodium chloride flush 0.9 % injection 5-40 mL, 5-40 mL, Intravenous, PRN  0.9 % sodium chloride infusion, 25 mL, Intravenous, PRN  polyethylene glycol (GLYCOLAX) packet 17 g, 17 g, Oral, Daily PRN  acetaminophen (TYLENOL) tablet 650 Negative for photophobia and visual disturbance. Respiratory: Negative for cough and shortness of breath. Cardiovascular: Negative for chest pain and palpitations. Gastrointestinal: Negative for nausea and vomiting. Endocrine: Negative for polydipsia and polyuria. Genitourinary: Positive for frequency and urgency. Musculoskeletal: Positive for arthralgias and back pain. Skin: Negative for rash and wound. Allergic/Immunologic: Negative for environmental allergies and food allergies. Neurological: Positive for facial asymmetry, speech difficulty, weakness and numbness. Negative for dizziness, tremors, seizures, syncope, light-headedness and headaches. Hematological: Negative for adenopathy. Does not bruise/bleed easily. Psychiatric/Behavioral: Negative for dysphoric mood. The patient is not nervous/anxious. PHYSICAL EXAMINATION:  Vitals: /83   Pulse 78   Temp 96.8 °F (36 °C) (Temporal)   Resp 16   Ht 5' 5\" (1.651 m)   Wt 220 lb 3.2 oz (99.9 kg)   SpO2 96%   BMI 36.64 kg/m²   General appearance:  She is an obese woman who is alert and cooperative. Skin: Skin color, texture, turgor normal. No rashes or lesions  HEENT: Head: Normal, normocephalic, atraumatic. Neck:no adenopathy, no carotid bruit, no JVD, supple, symmetrical, trachea midline and thyroid not enlarged, symmetric, no tenderness/mass/nodules  Lungs: clear to auscultation bilaterally  Heart: regular rate and rhythm, S1, S2 normal, no murmur, click, rub or gallop  Abdomen: soft, non-tender; bowel sounds normal; no masses,  no organomegaly  Extremities: extremities normal, atraumatic, no cyanosis or edema      NEUROLOGIC EXAMINATION:  Neurologic Exam     Mental Status   Oriented to person, place, and time. Speech: speech is normal   Level of consciousness: alert  Able to name object. Able to repeat. Speech is fluent. Cranial Nerves     CN II   Visual fields full to confrontation.      CN III, IV, VI   Pupils are equal, round, and reactive to light. Extraocular motions are normal.     CN VII   Right facial weakness: none  Left facial weakness: central    CN VIII   Hearing: intact    CN IX, X   Palate: symmetric    CN XI   Right sternocleidomastoid strength: normal  Left sternocleidomastoid strength: weak  Right trapezius strength: normal  Left trapezius strength: weak    CN XII   Tongue: not atrophic  Fasciculations: absent  Tongue deviation: none    Motor Exam   Muscle bulk: normal  Overall muscle tone: normal  Right arm pronator drift: absent  Left arm pronator drift: present    Strength   Right neck flexion: 5/5  Left neck flexion: 5/5  Right neck extension: 5/5  Left neck extension: 5/5  Right deltoid: 5/5  Left deltoid: 1/5  Right biceps: 5/5  Left biceps: 2/5  Right triceps: 5/5  Left triceps: 1/5  Right wrist flexion: 5/5  Left wrist flexion: 2/5  Right wrist extension: 5/5  Left wrist extension: 1/5  Right interossei: 5/5  Left interossei: 1/5  Right iliopsoas: 5/5  Left iliopsoas: 3/5  Right quadriceps: 5/5  Left quadriceps: 3/5  Right glutei: 5/5  Left glutei: 3/5  Right anterior tibial: 5/5  Left anterior tibial: 3/5  Right posterior tibial: 5/5  Left posterior tibial: 3/5  Right peroneal: 5/5  Left peroneal: 3/5  Right gastroc: 5/5  Left gastroc: 3/5    Sensory Exam   Right arm light touch: normal  Left arm light touch: -2.  Right leg light touch: normal  Left leg light touch: -2.  Right arm vibration: normal  Left arm vibration: -2. Right leg vibration: normal  Left leg vibration: -2. Right arm pinprick: normal  Left arm pinprick: -2.  Right leg pinprick: normal  Left leg pinprick: -2.    Gait, Coordination, and Reflexes     Coordination   Finger-nose-finger test: normal on right. Cannot be done on left.     Tremor   Resting tremor: absent  Intention tremor: absent  Action tremor: absent    Reflexes   Right brachioradialis: 2+  Left brachioradialis: 2+  Right biceps: 2+  Left biceps: 2+  Right triceps: 2+  Left triceps: 2+  Right patellar: 2+  Left patellar: 2+  Right achilles: 1+  Left achilles: 1+  Right plantar: upgoing  Left plantar: upgoing  Right Cunningham: absent  Left Cunningham: absent  Right ankle clonus: absent  Left ankle clonus: absent  Rapid alternating movements were normal on right and could not be performed on left. ADDITIONAL REVIEW:  CBC:    Recent Labs     05/29/21 0522   WBC 14.3*   HGB 14.1        BMP:     Recent Labs     05/29/21 0522      K 3.7      CO2 26   BUN 13   CREATININE 1.4*   GLUCOSE 145*     Hepatic:  Recent Labs     05/29/21 0522   AST 10   ALT 6   BILITOT 0.3   ALKPHOS 137*     INR:    Recent Labs     05/29/21 0522   INR 1.06     Narrative   Exam: MRI BRAIN WO CONTRAST - 5/29/2021 11:31 AM   Indication: Stroke like symptoms   Comparison: None available. Findings:   Small acute infarcts in the RIGHT periventricular white matter and   RIGHT putamen. There is some matching T2 FLAIR hyperintense signal. No   increased susceptibility to suggest acute or chronic hemorrhage. Major   physiologic flow voids are maintained. RIGHT frontal, LEFT parietal, and RIGHT occipital areas of   encephalomalacia likely representing old infarcts. Chronic   microvascular ischemic white matter change. Global cerebral volume   loss. No midline shift or mass effect. Lateral ventricles are   nondilated. Basilar cisterns are patent. Sella turcica and its   contents appear unremarkable. No acute orbital finding. Mastoid air cells and paranasal sinuses are   clear.       Impression   1.  Small acute infarcts in the RIGHT periventricular white matter and   RIGHT basal ganglia. 2.  Multiple bilateral remote infarcts. Signed by Dr Wilson James on 5/29/2021 2:43 PM         IMPRESSION:  1. Right hemisphere strokes with history of left and right hemisphere strokes convincing for cardioembolism. 2. PFO with septal aneurysm  3. Hypertension  4. Diabetes    PLAN:  1.  She needs anticoagulation. Will increase Lovenox, check P2Y12 and stop Plavix for now. Can always stop anticoagulation and go back to Plavix if she has PFO closure. 2. NICS. I cannot find any carotid study reports. There is mention of ordering CTAs but no report and with her CKD it may not have been done. 3. Additional labs  4.  PT/OT/ST Serena Mares M.D.

## 2021-05-29 NOTE — PROGRESS NOTES
Physical Therapy    Facility/Department: [facility] PROGRESSIVE CARE  Initial Assessment    NAME: Ernie Gasca  : 1955  MRN: [id number]    Date of Service: 2021    Discharge Recommendations:  Continue to assess pending progress, 24 hour supervision or assist, Patient would benefit from continued therapy after discharge        Assessment   Body structures, Functions, Activity limitations: Decreased functional mobility ; Decreased ROM; Decreased strength;Decreased sensation;Decreased endurance;Decreased balance;Decreased coordination; Increased pain  Assessment: Pt. will benefit fromcont. PT to decrease impairments. Pt. a fall risk and has low endurance and decreased strength and balance at this time. Pt. will need 24 hr care upon d/c and would benefit from cont. PT. Pt. feeling like her R knee will buckle during amb, but it did not. Pt. able to make it safely back to the bed. Pt. anticipating patent foramen ovale to be repaired this admission. Treatment Diagnosis: impaired gait and mobility  Prognosis: Fair  Decision Making: Medium Complexity  PT Education: PT Role;Goals;Plan of Care;General Safety;Gait Training; Adaptive Device Training;Transfer Training;Functional Mobility Training  Patient Education: use of call light for all needs  Barriers to Learning: slight confusion  REQUIRES PT FOLLOW UP: Yes  Activity Tolerance  Activity Tolerance: Patient limited by pain; Patient limited by fatigue       Patient Diagnosis(es): There were no encounter diagnoses. has a past medical history of Cerebral artery occlusion with cerebral infarction ([facility] COPD (chronic obstructive pulmonary disease) ([facility] Diabetes mellitus ([facility] Hypertension, and Neuromuscular disorder ([facility] has a past surgical history that includes Cholecystectomy; Appendectomy; Cardiac surgery; Splenectomy; Coronary angioplasty;  section; and Abdomen surgery.     Restrictions  Restrictions/Precautions  Restrictions/Precautions: Fall Risk  Required Braces or Orthoses?: No  Vision/Hearing  Vision: Within Functional Limits  Hearing: Within functional limits     Subjective  General  Chart Reviewed: Yes  Additional Pertinent Hx: recurrent stroke sx, she was seen three weeks ago, with right hemiplegia and expressive aphasia and right hand numbness. New finding: patent foramen ovale with atrial septal anuerysm noted and no thrombus  Response To Previous Treatment: Not applicable  Family / Caregiver Present: No  Referring Practitioner: Darlene Lincoln MD  Referral Date : 05/29/21  Diagnosis: recurrent stroke, patent foramen ovale  Follows Commands: Within Functional Limits  General Comment  Comments: LPN, Hai Barrera PT. Subjective  Subjective: Pt. willing to work with therapy. States she wants to sit EOB and then go back to bed. Maybe will feel like sitting in chair tomorrow, but has back pain that goes to her R thigh now. Pain Screening  Patient Currently in Pain: Yes  Pain Assessment  Pain Assessment: 0-10  Pain Level: 8  Pain Type: Chronic pain  Pain Location: Back;Leg  Pain Orientation: Right  Pain Radiating Towards: from back to R thigh  Pain Descriptors: Librado Records; Shooting  Pain Frequency: Intermittent  Functional Pain Assessment: Prevents or interferes some active activities and ADLs  Non-Pharmaceutical Pain Intervention(s): Repositioned; Ambulation/Increased Activity  Response to Pain Intervention: Patient Satisfied  Vital Signs  Patient Currently in Pain: Yes  Pre Treatment Pain Screening  Intervention List: Patient able to continue with treatment;Nurse called to administer meds    Orientation  Orientation  Overall Orientation Status: Impaired  Orientation Level: Disoriented to time;Oriented to place;Oriented to situation;Oriented to person  Social/Functional History  Social/Functional History  Lives With: Son  Type of Home: Apartment  Home Layout: One level  Bathroom Shower/Tub: Tub/Shower unit  Bathroom Equipment: Grab bars in shower, Tub transfer bench  Home Equipment: Rolling walker  Receives Help From: Family  ADL Assistance: Needs assistance  Ambulation Assistance: Needs assistance (has RW, but does not use it, son A with amb.)  Additional Comments: reports son is with her all the time. Pt. sleeps on couch. Cognition   Cognition  Overall Cognitive Status: WFL    Objective     Observation/Palpation  Posture: Good    AROM RLE (degrees)  RLE AROM: Exceptions  RLE General AROM: knee and ankle WFLs, hip flexes to 90  AROM LLE (degrees)  LLE AROM : Exceptions  LLE General AROM: knee and ankle WFLs, hip flexes to 90  Strength RLE  Strength RLE: Exception  Comment: grossly 3+/5 knee and ankle, hip 3-/5  Strength LLE  Strength LLE: Exception  Comment: grossly 3+/5 knee and ankle, hip 3-/5     Sensation  Overall Sensation Status: Impaired (reports neuropathy BLEs)  Bed mobility  Supine to Sit: Stand by assistance  Sit to Supine: Minimal assistance  Comment: pt sat on EOB x 10 mins SBA  Transfers  Sit to Stand: Contact guard assistance  Stand to sit: Contact guard assistance  Ambulation  Ambulation?: Yes  Ambulation 1  Surface: level tile  Device: Rolling Walker  Assistance: Contact guard assistance  Quality of Gait: unsteady, pt reports she feels like her R knee \"is about to go out\"  Gait Deviations: Slow Leanna;Decreased step length;Decreased step height  Distance: 6'  Comments: narrow ELENA. Balance  Posture: Good  Sitting - Static: Good;+  Sitting - Dynamic: Good;-  Standing - Static: Fair;+  Standing - Dynamic: Fair;-        Plan   Plan  Times per week: 3-7  Times per day: Daily  Plan weeks: 2  Current Treatment Recommendations: Strengthening, ROM, Balance Training, Functional Mobility Training, Transfer Training, Endurance Training, Gait Training, Safety Education & Training, Positioning, Equipment Evaluation, Education, & procurement, Patient/Caregiver Education & Training  Plan Comment: cont. PT per POC.   Safety Devices  Type of devices: Gait belt, Patient at risk for falls, Nurse notified, Call light within reach, Left in bed, Bed alarm in place (pt declined sitting up in chair)    G-Code       OutComes Score                                                  AM-PAC Score             Goals  Short term goals  Time Frame for Short term goals: 2 wks  Short term goal 1: supine to sit indep  Short term goal 2: sit to stand indep  Short term goal 3: amb. 25' with RW SBA  Patient Goals   Patient goals : get better and go home soon       Therapy Time   Individual Concurrent Group Co-treatment   Time In           Time Out           Minutes                   Clarence Dykes PT     Electronically signed by Clarence Dykes PT on 5/29/2021 at 5:21 PM

## 2021-05-29 NOTE — PROGRESS NOTES
Physical Therapy   Name: Payal Antunez  MRN:  [id number]  Date of service:  5/29/2021  Pt. not available for PT due to testing. Will f/u at a later time.   Electronically signed by Carmela Joseph PT on 5/29/2021 at 1:41 PM

## 2021-05-30 NOTE — PROGRESS NOTES
[facility] called with positive blood cultures drawn at their facility on 5/28/2021. Positive cultures in the anaerobic bottle gram positive cocci and clusters. Hospitalist notified.     Electronically signed by Vickie Young LPN on 1/83/4644 at 7:54 PM

## 2021-05-30 NOTE — PROGRESS NOTES
Pharmacy Renal Adjustment    Gary Jarvis is a 63 y.o. female. Pharmacy has renally adjusted medications per protocol. Recent Labs     05/29/21  0522 05/30/21  0312   BUN 13 13       Recent Labs     05/29/21  0522 05/30/21  0312   CREATININE 1.4* 1.4*       Estimated Creatinine Clearance: 49 mL/min (A) (based on SCr of 1.4 mg/dL (H)). Height:   Ht Readings from Last 1 Encounters:   05/29/21 5' 5\" (1.651 m)     Weight:  Wt Readings from Last 1 Encounters:   05/30/21 225 lb 3.2 oz (102.2 kg)       CrCl = 49    Plan: Adjust the following medications based on renal function:           Decrease famotidine 20 mg IV Q 12 hours to famotidine to 20 mg IV daily.     Electronically signed by Shayna Bernal, [address] [place] [address] on 5/30/2021 at 12:58 PM

## 2021-05-30 NOTE — PROGRESS NOTES
Spoke with patient's son and he stated that patient has an appointment with Dr. Kishan Lozano at [facility] at the next of June to address the PFO closure that is needed.      Electronically signed by Clinton Myrick LPN on 3/46/4758 at 84:48 AM

## 2021-05-30 NOTE — PROGRESS NOTES
[address], [address]  [address] [facility], [facility] [address]  Phone [phone]     Neurology Progress Note  2021 1:08 PM  Information:   Patient Name: Kimi Vargas  :   1955  Age:   63 y.o. MRN:   [id number]  Account #:  [de-identified]  Admit Date:   2021  Today:  21     ADMIT DX:   Hemiplegia of left nondominant side as late effect of cerebrovascular disease ([address]    Subjective:     Kimi Vargas is a 63y.o. year old woman with a history of hypertension, hyperlipidemia, diabetes, cardiovascular disease, and previous strokes who was transferred from [facility] this am after having yet another stroke. Interval History:   Her left limb strength is better. She complains of right leg pain and numbness/tingling. She has chronic back pain with right radicular leg pain. She has been up in a chair today. That did not help her right leg pain. Objective:     Past Medical History:  Past Medical History:   Diagnosis Date    Cerebral artery occlusion with cerebral infarction ([address]     COPD (chronic obstructive pulmonary disease) (HCC)     Diabetes mellitus ([address]     Hypertension     Neuromuscular disorder ([address]        Past Surgical History:   Procedure Laterality Date    ABDOMEN SURGERY      gastric resection    APPENDECTOMY      CARDIAC SURGERY       SECTION      x2    CHOLECYSTECTOMY      CORONARY ANGIOPLASTY      SPLENECTOMY         No family history on file. Social History     Socioeconomic History    Marital status:       Spouse name: Not on file    Number of children: Not on file    Years of education: Not on file    Highest education level: Not on file   Occupational History    Not on file   Tobacco Use    Smoking status: Former Smoker     Packs/day: 1.00     Years: 28.00     Pack years: 28.00     Types: Cigarettes     Quit date: 2019     Years since quittin.4    Smokeless tobacco: Never Used   Vaping Use    Vaping Use: Never used   Substance 0.60 K/uL    Basophils Absolute 0.00 0.00 - 0.20 K/uL    Atypical Lymphocytes Relative 4 0 - 8 %    RBC Morphology Normal    PROCALCITONIN    Collection Time: 05/30/21  3:12 AM   Result Value Ref Range    Procalcitonin 0.12 (H) 0.00 - 0.09 ng/mL   C-REACTIVE PROTEIN    Collection Time: 05/30/21  3:12 AM   Result Value Ref Range    CRP 0.71 (H) 0.00 - 0.50 mg/dL   POCT Glucose    Collection Time: 05/30/21  7:26 AM   Result Value Ref Range    POC Glucose 127 (H) 70 - 99 mg/dl    Performed on AccuChek    POCT Glucose    Collection Time: 05/30/21 11:21 AM   Result Value Ref Range    POC Glucose 179 (H) 70 - 99 mg/dl    Performed on AccuChek      Narrative & Impression  Transthoracic Echocardiography Report (TTE)      Demographics      Patient Name  Marie Angulo Date of Study         05/29/2021      MRN           [id number]       Gender                Female      Date of Birth 1955   Room Number           TEF-9569      Age           63 year(s)      Height:       65 inches    Referring Physician   Marcy Amaro MD      Weight:       220 pounds   Sonographer           Loly Beck RDCS      BSA:          2.06 m^2     Interpreting          Soco Stone MD                              Physician      BMI:          36.61 kg/m^2     Procedure     Type of Study      TTE procedure:ECHOCARDIOGRAM COMPLETE 2D WO COLOR DOPPLER. Study Location: Echo Lab  Technical Quality: Adequate visualization     Patient Status: Inpatient     Contrast Medium: Bubble Study.     Indications:Patent foramen ovale (PFO).     Conclusions      Summary   Normal size left atrium. positive bubble study   Normal right atrial dimension with no evidence of thrombus or mass noted. Normal right ventricular size with preserved RV function. Normal right ventricular size with preserved RV function. pfo present   Normal right ventricular size with preserved RV function. pfo present. large shunt. aneurysmal ias.    No evidence of significant pericardial effusion is noted. No evidence of pleural effusion. Structurally normal mitral valve with normal leaflet mobility. No evidence   of mitral valve stenosis or mild mitral regurgitation. Aortic valve appears to be tricuspid. Structurally normal aortic valve. No significant aortic regurgitation or stenosis is noted. Tricuspid valve is structurally normal.   No evidence of tricuspid regurgitation. Signature      ----------------------------------------------------------------   Electronically signed by Cristina Hurley MD(Interpreting   physician) on 05/29/2021 03:38 PM    Diet:  DIET DYSPHAGIA SOFT AND BITE-SIZED; Carb Control: 4 carb choices (60 gms)/meal; Dysphagia Soft and Bite-Sized    Examination:  Vitals: BP (!) 150/82   Pulse 69   Temp 97.6 °F (36.4 °C) (Temporal)   Resp 16   Ht 5' 5\" (1.651 m)   Wt 225 lb 3.2 oz (102.2 kg)   SpO2 92%   BMI 37.48 kg/m²      Intake/Output Summary (Last 24 hours) at 5/30/2021 1308  Last data filed at 5/30/2021 5532  Gross per 24 hour   Intake 360 ml   Output 350 ml   Net 10 ml     General appearance:  She is an obese woman who is alert and cooperative. Skin: Skin color, texture, turgor normal. No rashes or lesions  HEENT: Head: Normal, normocephalic, atraumatic. Neck:no adenopathy, no carotid bruit, no JVD, supple, symmetrical, trachea midline and thyroid not enlarged, symmetric, no tenderness/mass/nodules  Lungs: clear to auscultation bilaterally  Heart: regular rate and rhythm, S1, S2 normal, no murmur, click, rub or gallop  Extremities: extremities normal, atraumatic, no cyanosis or edema  Neurologic:  Alert, oriented. No dysarthria. Speech is fluent. EOMI, PERRL, VFF. Mild left lower facial weakness. Limb strength testing shows mild left limb weakness. Pronator drift positive on left. Rapid alternating movements are slow on left. Finger to nose testing shows no dysmetria. Assessment:   1.          Right hemisphere strokes with history of left and right hemisphere strokes convincing for cardioembolism. 2.         PFO with septal aneurysm  3. Hypertension  4. Diabetes    Plan:   1. Anticoagulation vs. PFO closure. Cardiology to see. 2. NICS  3. PT/OT/ST     Discharge planning: To be determined    Reviewed treatment plans with the patient and/or family. 25 minutes spent in face to face interaction and coordination of care.      Electronically signed by Bennett Saldana MD on 5/30/2021 at 1:08 PM

## 2021-05-30 NOTE — PROGRESS NOTES
Daily Progress Note    Date:2021  Patient: Patricia Schneider  : 1955  [id number]  CODE:Full Code No additional code details  PCP:No primary care provider on file. Admit Date: 2021  2:12 AM   LOS: 1 day       Subjective:    26-year-old female with history of multiple bilateral strokes, hypertension, hyperlipidemia, diabetes, cardiovascular disease, presented as transfer from outside facility due to concern for another stroke with acute severe left-sided weakness and dysarthria. Echo showed positive bubble study with PFO and septal aneurysm. She previously had right-sided weakness prior to presentation, which has improved. MRI brain here shows acute infarcts in right periventricular white matter and right basal ganglia as well as old bilateral remote infarcts. Given recurrent embolic strokes with PFO started on therapeutic anticoagulation with Lovenox. Cardiology on board with consideration for PFO closure. Held Plavix to allow washout for possible PFO closure. No acute events overnight. Patient remains with left-sided weakness.         Review of Systems    Comprehensive ROS completed and is negative except as otherwise noted      Objective:      Vital signs in last 24 hours:  Patient Vitals for the past 24 hrs:   BP Temp Temp src Pulse Resp SpO2 Weight   21 0632 (!) 150/82 97.6 °F (36.4 °C) Temporal 69 16 92 % --   21 0505 -- -- -- -- -- -- 225 lb 3.2 oz (102.2 kg)   21 0039 138/86 96.8 °F (36 °C) Temporal 68 17 94 % --   21 1842 (!) 135/95 96.3 °F (35.7 °C) Temporal 80 19 95 % --   21 1409 134/80 96.9 °F (36.1 °C) Temporal 65 16 96 % --     Physical exam    General: No acute distress, obese, alert  Head: Normocephalic, atraumatic  Neck: Supple, nontender, trachea midline  Cardiovascular: Normal rate, pulses equal  Respiratory: Lungs clear to auscultation bilaterally  Abdomen: Soft, nontender, bowel sounds present  Extremities: No clubbing, cyanosis or edema  Skin: Warm and dry, no rashes  Neurologic: Left sided weakness, follows commands        Lab Review   Recent Results (from the past 24 hour(s))   POCT Glucose    Collection Time: 05/29/21 12:19 PM   Result Value Ref Range    POC Glucose 108 (H) 70 - 99 mg/dl    Performed on AccuChek    Platelet E5G91 Inhibition    Collection Time: 05/29/21  1:18 PM   Result Value Ref Range    P2Y12 Result 214 194 - 418 PRU   Hemoglobin A1C    Collection Time: 05/29/21  1:18 PM   Result Value Ref Range    Hemoglobin A1C 9.1 (H) 4.0 - 6.0 %   Lipid Panel    Collection Time: 05/29/21  1:18 PM   Result Value Ref Range    Cholesterol, Total 194 160 - 199 mg/dL    Triglycerides 278 (H) 0 - 149 mg/dL    HDL 32 (L) 65 - 121 mg/dL    LDL Calculated 106 <100 mg/dL   Hematocrit    Collection Time: 05/29/21  1:18 PM   Result Value Ref Range    Hematocrit 42.2 37.0 - 47.0 %   Platelet count    Collection Time: 05/29/21  1:18 PM   Result Value Ref Range    Platelets 458 620 - 945 K/uL   POCT Glucose    Collection Time: 05/29/21  4:11 PM   Result Value Ref Range    POC Glucose 141 (H) 70 - 99 mg/dl    Performed on AccuChek    POCT Glucose    Collection Time: 05/29/21  8:09 PM   Result Value Ref Range    POC Glucose 222 (H) 70 - 99 mg/dl    Performed on AccuChek    Basic Metabolic Panel w/ Reflex to MG    Collection Time: 05/30/21  3:12 AM   Result Value Ref Range    Sodium 139 136 - 145 mmol/L    Potassium reflex Magnesium 4.0 3.5 - 5.0 mmol/L    Chloride 104 98 - 111 mmol/L    CO2 22 22 - 29 mmol/L    Anion Gap 13 7 - 19 mmol/L    Glucose 137 (H) 74 - 109 mg/dL    BUN 13 8 - 23 mg/dL    CREATININE 1.4 (H) 0.5 - 0.9 mg/dL    GFR Non- 38 (A) >60    GFR  46 (L) >59    Calcium 9.1 8.8 - 10.2 mg/dL   CBC Auto Differential    Collection Time: 05/30/21  3:12 AM   Result Value Ref Range    WBC 15.7 (H) 4.8 - 10.8 K/uL    RBC 4.36 4.20 - 5.40 M/uL    Hemoglobin 13.6 12.0 - 16.0 g/dL    Hematocrit 42.2 37.0 - 47.0 % MCV 96.8 81.0 - 99.0 fL    MCH 31.2 (H) 27.0 - 31.0 pg    MCHC 32.2 (L) 33.0 - 37.0 g/dL    RDW 14.1 11.5 - 14.5 %    Platelets 477 983 - 159 K/uL    MPV 10.4 9.4 - 12.3 fL    PLATELET SLIDE REVIEW Adequate     Neutrophils % 37.0 (L) 50.0 - 65.0 %    Lymphocytes % 50.0 (H) 20.0 - 40.0 %    Monocytes % 7.0 0.0 - 10.0 %    Eosinophils % 2.0 0.0 - 5.0 %    Basophils % 0.0 0.0 - 1.0 %    Neutrophils Absolute 5.8 1.5 - 7.5 K/uL    Immature Granulocytes # 0.0 K/uL    Lymphocytes Absolute 8.5 (H) 1.1 - 4.5 K/uL    Monocytes Absolute 1.10 (H) 0.00 - 0.90 K/uL    Eosinophils Absolute 0.31 0.00 - 0.60 K/uL    Basophils Absolute 0.00 0.00 - 0.20 K/uL    Atypical Lymphocytes Relative 4 0 - 8 %    RBC Morphology Normal    POCT Glucose    Collection Time: 05/30/21  7:26 AM   Result Value Ref Range    POC Glucose 127 (H) 70 - 99 mg/dl    Performed on AccuChek        I/O last 3 completed shifts: In: 130 [P.O.:120;  I.V.:10]  Out: 1050 [Urine:1050]  I/O this shift:  In: 240 [P.O.:240]  Out: -       Current Facility-Administered Medications:     oxyCODONE-acetaminophen (PERCOCET)  MG per tablet 1 tablet, 1 tablet, Oral, Q4H PRN, Marlon Sanz MD, 1 tablet at 05/30/21 0745    pregabalin (LYRICA) capsule 150 mg, 150 mg, Oral, BID, Marlon Sanz MD, 150 mg at 05/30/21 0740    [Held by provider] losartan (COZAAR) tablet 100 mg, 100 mg, Oral, Daily, Marlon Sanz MD    sodium chloride flush 0.9 % injection 5-40 mL, 5-40 mL, Intravenous, 2 times per day, Marlon Sanz MD, 10 mL at 05/30/21 0740    sodium chloride flush 0.9 % injection 5-40 mL, 5-40 mL, Intravenous, PRN, Marlon Sanz MD, 10 mL at 05/29/21 0503    0.9 % sodium chloride infusion, 25 mL, Intravenous, PRN, Marlon Sanz MD    polyethylene glycol (GLYCOLAX) packet 17 g, 17 g, Oral, Daily PRN, Marlon Sanz MD    acetaminophen (TYLENOL) tablet 650 mg, 650 mg, Oral, Q6H PRN **OR** acetaminophen (TYLENOL) suppository 650 mg, 650 mg, Rectal, Q6H PRN, Kristy Live MD    potassium chloride (KLOR-CON M) extended release tablet 40 mEq, 40 mEq, Oral, PRN **OR** potassium bicarb-citric acid (EFFER-K) effervescent tablet 40 mEq, 40 mEq, Oral, PRN **OR** potassium chloride 10 mEq/100 mL IVPB (Peripheral Line), 10 mEq, Intravenous, PRN, Kristy Live MD    potassium chloride 10 mEq/100 mL IVPB (Peripheral Line), 10 mEq, Intravenous, PRN, Kristy Live MD    magnesium sulfate 2000 mg in 50 mL IVPB premix, 2,000 mg, Intravenous, PRN, Kristy Live MD    ondansetron (ZOFRAN) injection 4 mg, 4 mg, Intravenous, Q6H PRN, Kristy Live MD    aspirin chewable tablet 81 mg, 81 mg, Oral, Daily, Kristy Live MD, 81 mg at 05/30/21 0740    famotidine (PEPCID) injection 20 mg, 20 mg, Intravenous, BID, Kristy Live MD, 20 mg at 05/30/21 0930    HYDROmorphone HCl PF (DILAUDID) injection 1 mg, 1 mg, Intravenous, Q4H PRN, Kristy Live MD, 1 mg at 05/30/21 0343    insulin lispro (HUMALOG) injection vial 0-6 Units, 0-6 Units, Subcutaneous, TID WC, Kristy Live MD    insulin lispro (HUMALOG) injection vial 0-3 Units, 0-3 Units, Subcutaneous, Nightly, Kristy Live MD    glucose (GLUTOSE) 40 % oral gel 15 g, 15 g, Oral, PRN, Kristy Live MD    dextrose 50 % IV solution, 12.5 g, Intravenous, PRN, Kristy Live MD    glucagon (rDNA) injection 1 mg, 1 mg, Intramuscular, PRN, Kristy Live MD    dextrose 5 % solution, 100 mL/hr, Intravenous, PRN, Kristy Live MD    enoxaparin (LOVENOX) injection 100 mg, 1 mg/kg, Subcutaneous, Daily, Epifanio Arthur MD, 100 mg at 05/30/21 0740        Assessment/plan  Principal Problem:    Hemiplegia of left nondominant side as late effect of cerebrovascular disease (HCC)  Active Problems:    Hypertension    CAD (coronary artery disease)    History of heart artery stent    COPD (chronic obstructive pulmonary disease) ([facility]    Dyspnea    Shortness of breath    Falls frequently    Gait instability    Hyperlipidemia associated with type 2 diabetes mellitus ([facility])    Morbid obesity ([facility])    CKD (chronic kidney disease) stage 3, GFR 30-59 ml/min    Narcotic habituation, continuous ([facility])    Edentulous    CVA (cerebral vascular accident) ([facility]    Patent foramen ovale with atrial septal aneurysm    Left hemiplegia ([facility])    Nephropathy    Neuropathy  Resolved Problems:    * No resolved hospital problems. *      Acute right hemisphere strokes, small acute infarcts in right periventricular white matter and right basal ganglia  History of multiple bilateral strokes  Continue neurochecks  Allow permissive hypertension  PFO with septal aneurysm confirmed on echo with positive bubble study   Continue therapeutic anticoagulation on Lovenox  Continue aspirin  D/C Plavix to allow washout prior to potential PFO closure  Follow-up carotid Dopplers  Await further cardiology recommendation regarding timing of PFO closure      PT/OT/speech      Diabetes mellitus  Glucose monitoring with sliding scale insulin correction if needed    Hypertension  Hold losartan, allowing permissive hypertension as noted above    Leukocytosis, ?  Reactive  Monitor  No evidence of infection on UA  No acute findings on chest x-ray  Procalcitonin negative      Plan to eventually discharged on Zio patch for outpatient cardiac monitoring to rule out A. fib    Anton Vargas MD 5/30/2021 10:08 AM

## 2021-05-30 NOTE — PROGRESS NOTES
Physical Therapy  Deedee Stevens  [id number]     05/30/21 0848   Subjective   Subjective Agrees to work with therapy. Bed Mobility   Supine to Sit Contact guard assistance   Transfers   Sit to Stand Contact guard assistance   Stand to sit Contact guard assistance   Ambulation   Ambulation? Yes   Ambulation 1   Surface level tile   Device Rolling Walker   Assistance Contact guard assistance   Quality of Gait patient states her legs feel shakey this morning   Distance 5'   Exercises   Hip Flexion 10   Hip Abduction 10   Knee Long Arc Quad 10   Ankle Pumps 10   Comments B LE ex's in sitting   Other Activities   Comment Patient states her legs feel shakey this morning but willing to get up to the chair for breakfast. Patient did well with short distance ambulation but did not feel like she could safely walk farther at this time. Patient positioned for comfort in recliner with all needs in reach and breakfast set up. Short term goals   Time Frame for Short term goals 2 wks   Short term goal 1 supine to sit indep   Short term goal 2 sit to stand indep   Short term goal 3 amb. 22' with RW SBA   Activity Tolerance   Activity Tolerance Patient Tolerated treatment well   Safety Devices   Type of devices Call light within reach; Left in chair   Electronically signed by [employer], PTA on 5/30/2021 at 9:03 AM

## 2021-05-31 PROBLEM — R53.1 ACUTE LEFT-SIDED WEAKNESS: Status: ACTIVE | Noted: 2021-01-01

## 2021-05-31 PROBLEM — Z86.73 HISTORY OF MULTIPLE STROKES: Status: ACTIVE | Noted: 2021-01-01

## 2021-05-31 NOTE — PROGRESS NOTES
Daily Progress Note    Date:2021  Patient: Tram Jackson  : 1955  GRV:[id number]  CODE:Full Code No additional code details  PCP:No primary care provider on file. Admit Date: 2021  2:12 AM   LOS: 2 days       Subjective:    81-year-old female with history of multiple bilateral strokes, hypertension, hyperlipidemia, diabetes, cardiovascular disease, presented as transfer from outside facility due to concern for another stroke with acute severe left-sided weakness and dysarthria. Echo showed positive bubble study with PFO and septal aneurysm. She previously had right-sided weakness prior to presentation, which has improved. MRI brain here shows acute infarcts in right periventricular white matter and right basal ganglia as well as old bilateral remote infarcts. Given recurrent embolic strokes with PFO started on therapeutic anticoagulation with Lovenox. Cardiology on board with consideration for PFO closure. Held Plavix to allow washout for possible PFO closure. No acute events overnight. She appears to have improvement in her left-sided weakness. Patient up and out of bed with assistance of nursing staff.         Review of Systems    Comprehensive ROS completed and is negative except as otherwise noted      Objective:      Vital signs in last 24 hours:  Patient Vitals for the past 24 hrs:   BP Temp Temp src Pulse Resp SpO2 Weight   21 1533 126/82 -- -- 76 18 -- --   21 1432 (!) 129/94 96.2 °F (35.7 °C) Temporal 91 18 94 % --   21 0701 (!) 157/88 96.5 °F (35.8 °C) Temporal 78 18 94 % --   21 0549 -- -- -- -- -- -- 224 lb 9.6 oz (101.9 kg)   21 0042 (!) 143/76 -- -- 79 17 93 % --   21 1830 (!) 174/88 96.6 °F (35.9 °C) Temporal 81 18 95 % --     Physical exam    General: No acute distress, obese, alert  Head: Normocephalic, atraumatic  Neck: Supple, nontender, trachea midline  Cardiovascular: Normal rate, pulses equal  Respiratory: Lungs clear to auscultation bilaterally  Abdomen: Soft, nontender, bowel sounds present  Extremities: No clubbing, cyanosis or edema  Skin: Warm and dry, no rashes  Neurologic: Mild left-sided weakness-improving, follows commands, no speech difficulty        Lab Review   Recent Results (from the past 24 hour(s))   POCT Glucose    Collection Time: 05/30/21  5:09 PM   Result Value Ref Range    POC Glucose 153 (H) 70 - 99 mg/dl    Performed on AccuChek    Basic Metabolic Panel w/ Reflex to MG    Collection Time: 05/31/21  1:28 AM   Result Value Ref Range    Sodium 138 136 - 145 mmol/L    Potassium reflex Magnesium 4.1 3.5 - 5.0 mmol/L    Chloride 104 98 - 111 mmol/L    CO2 23 22 - 29 mmol/L    Anion Gap 11 7 - 19 mmol/L    Glucose 165 (H) 74 - 109 mg/dL    BUN 17 8 - 23 mg/dL    CREATININE 1.5 (H) 0.5 - 0.9 mg/dL    GFR Non-African American 35 (A) >60    GFR  42 (L) >59    Calcium 9.0 8.8 - 10.2 mg/dL   CBC Auto Differential    Collection Time: 05/31/21  1:28 AM   Result Value Ref Range    WBC 16.1 (H) 4.8 - 10.8 K/uL    RBC 4.22 4.20 - 5.40 M/uL    Hemoglobin 13.0 12.0 - 16.0 g/dL    Hematocrit 40.6 37.0 - 47.0 %    MCV 96.2 81.0 - 99.0 fL    MCH 30.8 27.0 - 31.0 pg    MCHC 32.0 (L) 33.0 - 37.0 g/dL    RDW 13.6 11.5 - 14.5 %    Platelets 446 486 - 330 K/uL    MPV 10.7 9.4 - 12.3 fL    PLATELET SLIDE REVIEW Adequate     Neutrophils % 49.0 (L) 50.0 - 65.0 %    Lymphocytes % 42.0 (H) 20.0 - 40.0 %    Monocytes % 2.0 0.0 - 10.0 %    Eosinophils % 5.0 0.0 - 5.0 %    Basophils % 1.0 0.0 - 1.0 %    Neutrophils Absolute 7.9 (H) 1.5 - 7.5 K/uL    Immature Granulocytes # 0.1 K/uL    Lymphocytes Absolute 6.9 (H) 1.1 - 4.5 K/uL    Monocytes Absolute 0.30 0.00 - 0.90 K/uL    Eosinophils Absolute 0.81 (H) 0.00 - 0.60 K/uL    Basophils Absolute 0.20 0.00 - 0.20 K/uL    Atypical Lymphocytes Relative 1 0 - 8 %    RBC Morphology Normal    POCT Glucose    Collection Time: 05/31/21  7:04 AM   Result Value Ref Range    POC Glucose 127 (H) 70 - 99 mg/dl    Performed on AccuChek    POCT Glucose    Collection Time: 05/31/21 11:31 AM   Result Value Ref Range    POC Glucose 207 (H) 70 - 99 mg/dl    Performed on AccuChek        I/O last 3 completed shifts: In: 660 [P.O.:660]  Out: 1425 [Urine:1425]  No intake/output data recorded.       Current Facility-Administered Medications:     famotidine (PEPCID) injection 20 mg, 20 mg, Intravenous, Daily, Mely Anne MD, 20 mg at 05/31/21 0935    oxyCODONE-acetaminophen (PERCOCET)  MG per tablet 1 tablet, 1 tablet, Oral, Q4H PRN, Mely Anne MD, 1 tablet at 05/31/21 1521    pregabalin (LYRICA) capsule 150 mg, 150 mg, Oral, BID, Mely Anne MD, 150 mg at 05/31/21 0934    [Held by provider] losartan (COZAAR) tablet 100 mg, 100 mg, Oral, Daily, Mely Anne MD    sodium chloride flush 0.9 % injection 5-40 mL, 5-40 mL, Intravenous, 2 times per day, Mely Anne MD, 10 mL at 05/31/21 0949    sodium chloride flush 0.9 % injection 5-40 mL, 5-40 mL, Intravenous, PRN, Mely Anne MD, 10 mL at 05/29/21 0503    0.9 % sodium chloride infusion, 25 mL, Intravenous, PRN, Mely Anne MD    polyethylene glycol (GLYCOLAX) packet 17 g, 17 g, Oral, Daily PRN, Mely Anne MD    acetaminophen (TYLENOL) tablet 650 mg, 650 mg, Oral, Q6H PRN **OR** acetaminophen (TYLENOL) suppository 650 mg, 650 mg, Rectal, Q6H PRN, Mely Anne MD    potassium chloride (KLOR-CON M) extended release tablet 40 mEq, 40 mEq, Oral, PRN **OR** potassium bicarb-citric acid (EFFER-K) effervescent tablet 40 mEq, 40 mEq, Oral, PRN **OR** potassium chloride 10 mEq/100 mL IVPB (Peripheral Line), 10 mEq, Intravenous, PRN, Mely Anne MD    potassium chloride 10 mEq/100 mL IVPB (Peripheral Line), 10 mEq, Intravenous, PRN, Mely Anne MD    magnesium sulfate 2000 mg in 50 mL IVPB premix, 2,000 mg, Intravenous, PRN, Radha Martinez Yasmine Baekr MD    ondansetron (ZOFRAN) injection 4 mg, 4 mg, Intravenous, Q6H PRN, Dayanara Keene MD    aspirin chewable tablet 81 mg, 81 mg, Oral, Daily, Dayanara Keene MD, 81 mg at 05/31/21 0935    HYDROmorphone HCl PF (DILAUDID) injection 1 mg, 1 mg, Intravenous, Q4H PRN, Dayanara Keene MD, 1 mg at 05/31/21 0115    insulin lispro (HUMALOG) injection vial 0-6 Units, 0-6 Units, Subcutaneous, TID WC, Dayanara Keene MD, 2 Units at 05/31/21 1216    insulin lispro (HUMALOG) injection vial 0-3 Units, 0-3 Units, Subcutaneous, Nightly, Dayanara Keene MD    glucose (GLUTOSE) 40 % oral gel 15 g, 15 g, Oral, PRN, Dayanara Keene MD    dextrose 50 % IV solution, 12.5 g, Intravenous, PRN, Dayanara Keene MD    glucagon (rDNA) injection 1 mg, 1 mg, Intramuscular, PRN, Dayanara Keene MD    dextrose 5 % solution, 100 mL/hr, Intravenous, PRN, Dayanara Keene MD    enoxaparin (LOVENOX) injection 100 mg, 1 mg/kg, Subcutaneous, Daily, Johann Perez MD, 100 mg at 05/31/21 0935        Assessment/plan  Principal Problem:    Right hemisphere, cerebral infarction [facility])  Active Problems:    Acute left-sided weakness    Hypertension    CAD (coronary artery disease)    History of heart artery stent    COPD (chronic obstructive pulmonary disease) ([facility])    Dyspnea    Falls frequently    Gait instability    Hyperlipidemia associated with type 2 diabetes mellitus ([facility])    Morbid obesity ([facility])    CKD (chronic kidney disease) stage 3, GFR 30-59 ml/min    Narcotic habituation, continuous ([facility])    Edentulous    Patent foramen ovale with atrial septal aneurysm    Nephropathy    Neuropathy    History of multiple strokes  Resolved Problems:    * No resolved hospital problems.  *      Acute right hemisphere strokes, small acute infarcts in right periventricular white matter and right basal ganglia  History of multiple bilateral strokes  -Continue neurochecks  -Allowed permissive hypertension  -PFO with septal aneurysm confirmed on echo with positive bubble study   -Carotid Dopplers without significant stenosis  -Continue therapeutic anticoagulation on Lovenox  -Continue aspirin  -held Plavix to allow washout prior to potential PFO closure  -Await further cardiology recommendation regarding timing of PFO closure      PT/OT/speech    Diabetes mellitus  Glucose monitoring with sliding scale insulin correction if needed    Hypertension  Hold losartan, allowing permissive hypertension     Leukocytosis, ? Reactive, low suspicion for infection  Monitor  No evidence of infection on UA  No acute findings on chest x-ray  Procalcitonin negative    Disposition: Pending plan for PFO closure and rehab assessment.   Plan to eventually discharged on Zio patch for outpatient cardiac monitoring to rule out A. fib      Daniel Healy MD 5/31/2021 4:12 PM

## 2021-05-31 NOTE — PROGRESS NOTES
Currently   Other Topics Concern    Not on file   Social History Narrative    Not on file     Social Determinants of Health     Financial Resource Strain:     Difficulty of Paying Living Expenses:    Food Insecurity:     Worried About Running Out of Food in the Last Year:     [address] in the Last Year:    Transportation Needs:     Lack of Transportation (Medical):      Lack of Transportation (Non-Medical):    Physical Activity:     Days of Exercise per Week:     Minutes of Exercise per Session:    Stress:     Feeling of Stress :    Social Connections:     Frequency of Communication with Friends and Family:     Frequency of Social Gatherings with Friends and Family:     Attends [employer] Services:     Active Member of Clubs or Organizations:     Attends Club or Organization Meetings:     Marital Status:    Intimate Partner Violence:     Fear of Current or Ex-Partner:     Emotionally Abused:     Physically Abused:     Sexually Abused:        Medications:   sodium chloride      dextrose        famotidine (PEPCID) injection  20 mg Intravenous Daily    pregabalin  150 mg Oral BID    [Held by provider] losartan  100 mg Oral Daily    sodium chloride flush  5-40 mL Intravenous 2 times per day    aspirin  81 mg Oral Daily    insulin lispro  0-6 Units Subcutaneous TID WC    insulin lispro  0-3 Units Subcutaneous Nightly    enoxaparin  1 mg/kg Subcutaneous Daily       Diagnostic Studies:  Reviewed all labs/diagnositcs since last 24hrs:  Recent Results (from the past 24 hour(s))   POCT Glucose    Collection Time: 05/30/21 11:21 AM   Result Value Ref Range    POC Glucose 179 (H) 70 - 99 mg/dl    Performed on AccuChek    POCT Glucose    Collection Time: 05/30/21  5:09 PM   Result Value Ref Range    POC Glucose 153 (H) 70 - 99 mg/dl    Performed on AccuChek    Basic Metabolic Panel w/ Reflex to MG    Collection Time: 05/31/21  1:28 AM   Result Value Ref Range    Sodium 138 136 - 145 mmol/L Potassium reflex Magnesium 4.1 3.5 - 5.0 mmol/L    Chloride 104 98 - 111 mmol/L    CO2 23 22 - 29 mmol/L    Anion Gap 11 7 - 19 mmol/L    Glucose 165 (H) 74 - 109 mg/dL    BUN 17 8 - 23 mg/dL    CREATININE 1.5 (H) 0.5 - 0.9 mg/dL    GFR Non-African American 35 (A) >60    GFR  42 (L) >59    Calcium 9.0 8.8 - 10.2 mg/dL   CBC Auto Differential    Collection Time: 05/31/21  1:28 AM   Result Value Ref Range    WBC 16.1 (H) 4.8 - 10.8 K/uL    RBC 4.22 4.20 - 5.40 M/uL    Hemoglobin 13.0 12.0 - 16.0 g/dL    Hematocrit 40.6 37.0 - 47.0 %    MCV 96.2 81.0 - 99.0 fL    MCH 30.8 27.0 - 31.0 pg    MCHC 32.0 (L) 33.0 - 37.0 g/dL    RDW 13.6 11.5 - 14.5 %    Platelets 724 582 - 495 K/uL    MPV 10.7 9.4 - 12.3 fL    PLATELET SLIDE REVIEW Adequate     Neutrophils % 49.0 (L) 50.0 - 65.0 %    Lymphocytes % 42.0 (H) 20.0 - 40.0 %    Monocytes % 2.0 0.0 - 10.0 %    Eosinophils % 5.0 0.0 - 5.0 %    Basophils % 1.0 0.0 - 1.0 %    Neutrophils Absolute 7.9 (H) 1.5 - 7.5 K/uL    Immature Granulocytes # 0.1 K/uL    Lymphocytes Absolute 6.9 (H) 1.1 - 4.5 K/uL    Monocytes Absolute 0.30 0.00 - 0.90 K/uL    Eosinophils Absolute 0.81 (H) 0.00 - 0.60 K/uL    Basophils Absolute 0.20 0.00 - 0.20 K/uL    Atypical Lymphocytes Relative 1 0 - 8 %    RBC Morphology Normal    POCT Glucose    Collection Time: 05/31/21  7:04 AM   Result Value Ref Range    POC Glucose 127 (H) 70 - 99 mg/dl    Performed on Intrusic        Diet:  DIET DYSPHAGIA SOFT AND BITE-SIZED;  Carb Control: 4 carb choices (60 gms)/meal; Dysphagia Soft and Bite-Sized    Examination:  Vitals: BP (!) 157/88   Pulse 78   Temp 96.5 °F (35.8 °C) (Temporal)   Resp 18   Ht 5' 5\" (1.651 m)   Wt 224 lb 9.6 oz (101.9 kg)   SpO2 94%   BMI 37.38 kg/m²      Intake/Output Summary (Last 24 hours) at 5/31/2021 1006  Last data filed at 5/31/2021 0933  Gross per 24 hour   Intake 780 ml   Output 975 ml   Net -195 ml     General appearance:  She is an obese woman who is alert and cooperative. Skin: Skin color, texture, turgor normal. No rashes or lesions  HEENT: Head: Normal, normocephalic, atraumatic. Neck:no adenopathy, no carotid bruit, no JVD, supple, symmetrical, trachea midline and thyroid not enlarged, symmetric, no tenderness/mass/nodules  Lungs: clear to auscultation bilaterally  Heart: regular rate and rhythm, S1, S2 normal, no murmur, click, rub or gallop  Extremities: extremities normal, atraumatic, no cyanosis or edema  Neurologic:  Alert, oriented. No dysarthria. Speech is fluent. EOMI, PERRL, VFF. Mild left lower facial weakness. Limb strength testing shows mild left limb weakness. Pronator drift positive on left. Rapid alternating movements are slow on left. Finger to nose testing shows no dysmetria. Assessment:   1.         Right hemisphere strokes with history of left and right hemisphere strokes convincing for cardioembolism. 2.         PFO with septal aneurysm  3.         Hypertension  4.         Diabetes    Plan:   1. Anticoagulation vs. PFO closure. 2.         NICS  3. PT/OT/ST     Discharge planning: To be determined    Reviewed treatment plans with the patient and/or family. 25 minutes spent in face to face interaction and coordination of care.      Electronically signed by Hoa Mike MD on 5/31/2021 at 10:06 AM

## 2021-05-31 NOTE — PROGRESS NOTES
Physical Therapy  Name: Jina Asencio  MRN:  [id number]  Date of service:  5/31/2021 05/31/21 1133   General   Missed reason Patient declined   Subjective   Subjective Attempt: Pt sleeping upon entry, arouses but states that she is too tired right now and would be willing to try working with therapy \"later. \" Will cont to follow.          Electronically signed by Tiffany Robbins PTA on 5/31/2021 at 11:34 AM

## 2021-06-01 NOTE — PROGRESS NOTES
[address], [address]  [place]  Phone [phone]     Neurology Progress Note  2021 4:57 PM  Information:   Patient Name: Rohit Flores  :   1955  Age:   63 y.o. MRN:   [id number]  Account #:  [de-identified]  Admit Date:   2021  Today:  21     ADMIT DX:   Right hemisphere, cerebral infarction [facility])    Subjective:     Casper Ree a 63y.o. year old woman with a history of hypertension, hyperlipidemia, diabetes, cardiovascular disease, and previous strokes who was transferred from [facility]  after having yet another stroke.      Interval History:   No new problems. Her left sided weakness has improved. She is doing well with PT and OT. Objective:     Past Medical History:  Past Medical History:   Diagnosis Date    [facility]     COPD (chronic obstructive pulmonary disease) ([facility])     Diabetes mellitus ([facility]     Hypertension     Neuromuscular disorder ([facility]        Past Surgical History:   Procedure Laterality Date    ABDOMEN SURGERY      gastric resection    APPENDECTOMY      CARDIAC SURGERY       SECTION      x2    CHOLECYSTECTOMY      CORONARY ANGIOPLASTY      SPLENECTOMY         No family history on file. Social History     Socioeconomic History    Marital status:       Spouse name: Not on file    Number of children: Not on file    Years of education: Not on file    Highest education level: Not on file   Occupational History    Not on file   Tobacco Use    Smoking status: Former Smoker     Packs/day: 1.00     Years: 28.00     Pack years: 28.00     Types: Cigarettes     Quit date: 2019     Years since quittin.4    Smokeless tobacco: Never Used   Vaping Use    Vaping Use: Never used   Substance and Sexual Activity    Alcohol use: Not Currently    Drug use: Never    Sexual activity: Not Currently   Other Topics Concern    Not on file   Social History Narrative    Not on file     Social Determinants of Health     Financial Resource Strain:     Difficulty of Paying Living Expenses:    Food Insecurity:     Worried About Running Out of Food in the Last Year:     [address] in the Last Year:    Transportation Needs:     Lack of Transportation (Medical):      Lack of Transportation (Non-Medical):    Physical Activity:     Days of Exercise per Week:     Minutes of Exercise per Session:    Stress:     Feeling of Stress :    Social Connections:     Frequency of Communication with Friends and Family:     Frequency of Social Gatherings with Friends and Family:     Attends [employer] Services:     Active Member of Clubs or Organizations:     Attends Club or Organization Meetings:     Marital Status:    Intimate Partner Violence:     Fear of Current or Ex-Partner:     Emotionally Abused:     Physically Abused:     Sexually Abused:        Medications:   sodium chloride      dextrose        famotidine (PEPCID) injection  20 mg Intravenous Daily    pregabalin  150 mg Oral BID    [Held by provider] losartan  100 mg Oral Daily    sodium chloride flush  5-40 mL Intravenous 2 times per day    aspirin  81 mg Oral Daily    insulin lispro  0-6 Units Subcutaneous TID WC    insulin lispro  0-3 Units Subcutaneous Nightly    enoxaparin  1 mg/kg Subcutaneous Daily       Diagnostic Studies:  Reviewed all labs/diagnositcs since last 24hrs:  Recent Results (from the past 24 hour(s))   POCT Glucose    Collection Time: 05/31/21  5:13 PM   Result Value Ref Range    POC Glucose 127 (H) 70 - 99 mg/dl    Performed on AccuChek    POCT Glucose    Collection Time: 05/31/21  8:00 PM   Result Value Ref Range    POC Glucose 204 (H) 70 - 99 mg/dl    Performed on AccuChek    CBC Auto Differential    Collection Time: 06/01/21  3:28 AM   Result Value Ref Range    WBC 12.5 (H) 4.8 - 10.8 K/uL    RBC 4.64 4.20 - 5.40 M/uL    Hemoglobin 14.6 12.0 - 16.0 g/dL    Hematocrit 48.2 (H) 37.0 - 47.0 %    .9 (H) 81.0 - 99.0 fL    MCH 31.5 (H) 27.0 - 31.0 pg    MCHC 30.3 (L) 33.0 - 37.0 g/dL    RDW 14.1 11.5 - 14.5 %    Platelets 667 805 - 899 K/uL    MPV 10.7 9.4 - 12.3 fL    PLATELET SLIDE REVIEW Adequate     Neutrophils % 24.0 (L) 50.0 - 65.0 %    Lymphocytes % 60.0 (H) 20.0 - 40.0 %    Monocytes % 1.0 0.0 - 10.0 %    Eosinophils % 9.0 (H) 0.0 - 5.0 %    Basophils % 1.0 0.0 - 1.0 %    Neutrophils Absolute 3.1 1.5 - 7.5 K/uL    Immature Granulocytes # 0.0 K/uL    Lymphocytes Absolute 7.9 (H) 1.1 - 4.5 K/uL    Monocytes Absolute 0.10 0.00 - 0.90 K/uL    Eosinophils Absolute 1.13 (H) 0.00 - 0.60 K/uL    Basophils Absolute 0.10 0.00 - 0.20 K/uL    Atypical Lymphocytes Relative 3 0 - 8 %    Myelocyte Percent 1 (A) %    Unid. Mononu 1 (A) %    Macrocytes 1+ (A)     Polychromasia 1+ (A)     Hypochromia 1+ (A)     Ovalocytes Occasional (A)    SPECIMEN REJECTION    Collection Time: 06/01/21  5:10 AM   Result Value Ref Range    Rejected Test BMPX     Reason for Rejection see below    SPECIMEN REJECTION    Collection Time: 06/01/21  6:20 AM   Result Value Ref Range    Rejected Test BMPX     Reason for Rejection see below    Basic Metabolic Panel w/ Reflex to MG    Collection Time: 06/01/21  6:54 AM   Result Value Ref Range    Sodium 140 136 - 145 mmol/L    Potassium reflex Magnesium 4.4 3.5 - 5.0 mmol/L    Chloride 104 98 - 111 mmol/L    CO2 25 22 - 29 mmol/L    Anion Gap 11 7 - 19 mmol/L    Glucose 124 (H) 74 - 109 mg/dL    BUN 17 8 - 23 mg/dL    CREATININE 1.4 (H) 0.5 - 0.9 mg/dL    GFR Non- 38 (A) >60    GFR  46 (L) >59    Calcium 9.3 8.8 - 10.2 mg/dL   POCT Glucose    Collection Time: 06/01/21  8:03 AM   Result Value Ref Range    POC Glucose 110 (H) 70 - 99 mg/dl    Performed on AccuChek    POCT Glucose    Collection Time: 06/01/21 11:43 AM   Result Value Ref Range    POC Glucose 158 (H) 70 - 99 mg/dl    Performed on AccuChek      Narrative   Vascular Carotid Procedure      Demographics        Patient Name   Lissette Dyson Age                   63        Patient Number  [id number]       Gender                Female        Visit Number    [id number]    Interpreting          Skyler Sexton MD                                 Physician        Date of Birth   1955   Referring Physician   Katty Leone        Accession       [id number]   Sonographer           Evelyn Barnett, RACH, RDMS    Number       Procedure   Type of Study:        Cerebral:Carotid, VL CAROTID BILATERAL.       Indications for Study:Stroke.       Risk Factors         - The patient's risk factor(s) include: diabetes mellitus and arterial       hypertension.         - The patient has a former tobacco history.        Impression        Duplex sonography with color flow enhancement was performed bilaterally on    the cervical carotid system. No evidence of hemodynamically significant    stenosis in the bilateral carotid and vertebral arteries.        Signature        ----------------------------------------------------------------    Electronically signed by Leonid Salgado MD(Interpreting    physician) on 05/31/2021 06:32 AM       Diet:  DIET DYSPHAGIA SOFT AND BITE-SIZED; Carb Control: 4 carb choices (60 gms)/meal; Dysphagia Soft and Bite-Sized    Examination:  Vitals: BP (!) 142/92   Pulse 91   Temp 97.2 °F (36.2 °C) (Temporal)   Resp 16   Ht 5' 5\" (1.651 m)   Wt 221 lb 6 oz (100.4 kg)   SpO2 93%   BMI 36.84 kg/m²      Intake/Output Summary (Last 24 hours) at 6/1/2021 1657  Last data filed at 6/1/2021 1457  Gross per 24 hour   Intake 570 ml   Output 700 ml   Net -130 ml     General appearance:  She is an obese woman who is alert and cooperative. Skin: Skin color, texture, turgor normal. No rashes or lesions  HEENT: Head: Normal, normocephalic, atraumatic.   Neck:no adenopathy, no carotid bruit, no JVD, supple, symmetrical, trachea midline and thyroid not enlarged, symmetric, no tenderness/mass/nodules  Lungs: clear to auscultation bilaterally  Heart: regular rate and rhythm, S1, S2 normal, no murmur, click, rub or gallop  Extremities: extremities normal, atraumatic, no cyanosis or edema  Neurologic:  Alert, oriented.  No dysarthria.  Speech is fluent.  EOMI, PERRL, VFF.  Mild left lower facial weakness.  Limb strength testing shows mild left limb weakness.  Pronator drift positive on left. Rapid alternating movements are slow on left.  Finger to nose testing shows no dysmetria. Assessment:   1.         Right hemisphere strokes with history of left and right hemisphere strokes convincing for cardioembolism. The P2Y12 plt inh test indicates she is a Plavix nonresponder so after PFO closure, I'd recommend Brillinta. 2.         PFO with septal aneurysm  3.         Hypertension  4.         Diabetes    Plan:   1. Dr. Jarrod Benavides input appreciated. She is to have PFO closure tomorrow. 2. PT/OT/ST     Discharge planning:   Home    Reviewed treatment plans with the patient and/or family. 25 minutes spent in face to face interaction and coordination of care.      Electronically signed by Bennett Saldana MD on 6/1/2021 at 4:57 PM

## 2021-06-01 NOTE — PROGRESS NOTES
Prevents or interferes some active activities and ADLs  Non-Pharmaceutical Pain Intervention(s): Rest  Response to Pain Intervention: Patient Satisfied  Vital Signs  Temp: 97.2 °F (36.2 °C)  Temp Source: Temporal  Pulse: 91  Heart Rate Source: Monitor  Resp: 16  BP: (!) 142/92  BP Location: Left Arm  Patient Position: Supine  Level of Consciousness: Alert (0)  Patient Currently in Pain: Denies  Oxygen Therapy  SpO2: 93 %  O2 Device: None (Room air)  Social/Functional History  Social/Functional History  Lives With: Son  Type of Home: Apartment  Home Layout: One level  Bathroom Shower/Tub: Tub/Shower unit  Bathroom Equipment: Grab bars in shower, Tub transfer bench  Home Equipment: Rolling walker  Receives Help From: Family  ADL Assistance: Needs assistance  Ambulation Assistance: Needs assistance (has RW, but does not use it, son A with amb.)  Additional Comments: reports son is with her all the time. Pt. sleeps on couch.        Objective   Vision: Impaired (can not read enlarged print books, son has to lead her by the hand at times)          Balance  Sitting Balance: Independent  Standing Balance: Contact guard assistance  Functional Mobility  Assist Level: Contact guard assistance  Toilet Transfers  Toilet Transfer: Contact guard assistance  ADL  Feeding: Independent  Grooming: Independent;Setup  UE Bathing: Minimal assistance  LE Bathing: Minimal assistance  UE Dressing: Independent;Setup  LE Dressing: Contact guard assistance;Minimal assistance  Toileting: Contact guard assistance;Minimal assistance        Bed mobility  Supine to Sit: Independent  Sit to Supine: Minimal assistance  Transfers  Stand Step Transfers: Contact guard assistance                       LUE PROM (degrees)  LUE PROM: WFL  LUE AROM (degrees)  LUE AROM : WFL  RUE PROM (degrees)  RUE PROM: WFL  RUE AROM (degrees)  RUE AROM : WFL  LUE Strength  Gross LUE Strength: WFL  RUE Strength  Gross RUE Strength: WFL  RUE Strength Comment: tests similiar

## 2021-06-01 NOTE — PROGRESS NOTES
06/01/21 1430   Restrictions/Precautions   Restrictions/Precautions Fall Risk   Required Braces or Orthoses? No   General   Chart Reviewed Yes   Additional Pertinent Hx recurrent stroke sx, she was seen three weeks ago, with right hemiplegia and expressive aphasia and right hand numbness. New finding: patent foramen ovale with atrial septal anuerysm noted and no thrombus   Subjective   Subjective Patient in bed agrees to therapy. Says she has procedure scheduled for this PM   Pain Screening   Patient Currently in Pain Yes   Intervention List Patient able to continue with treatment;Nurse called to administer meds   Pain Assessment   Pain Assessment 0-10   Pain Level 8   Patient's Stated Pain Goal No pain   Pain Type Chronic pain   Pain Location Hip   Pain Orientation Right   Pain Descriptors Dull; Sharp   Pain Frequency Intermittent   Pain Onset On-going   Clinical Progression Not changed   Functional Pain Assessment Prevents or interferes some active activities and ADLs   Non-Pharmaceutical Pain Intervention(s) Rest   Response to Pain Intervention Patient Satisfied   Vital Signs   Level of Consciousness Alert (0)   Oxygen Therapy   O2 Device None (Room air)   Bed Mobility   Supine to Sit Independent   Sit to Supine Independent   Transfers   Sit to Stand Contact guard assistance   Stand to sit Contact guard assistance   Bed to Chair Contact guard assistance   Ambulation   Ambulation? Yes   Ambulation 1   Surface level tile   Device Rolling Walker   Assistance Contact guard assistance   Quality of Gait Steady no LOB   Gait Deviations Slow Leanna;Decreased step length   Distance 12'   Comments Gait limited by R hip pain   Exercises   Heelslides 20   Hip Flexion 20   Hip Abduction 20   Knee Long Arc Quad 20   Knee Active Range of Motion yes   Ankle Pumps 20   Comments BL LE's EX in sitting AROM.   R side weaker than left   Activity Tolerance   Activity Tolerance Patient Tolerated treatment well   Safety Devices Type of devices Left in bed;Call light within reach; Bed alarm in place   Physical Therapy    Electronically signed by Yo Mujica PTA on 6/1/2021 at 2:40 PM

## 2021-06-01 NOTE — PROGRESS NOTES
Cardiology Daily Note   Missy Cameron MD      Patient:  Payal Antunez  [id number]    Patient Active Problem List    Diagnosis Date Noted    History of multiple strokes 05/31/2021    Acute left-sided weakness 05/29/2021    Hypertension 05/29/2021    CAD (coronary artery disease) 05/29/2021    History of heart artery stent 05/29/2021    COPD (chronic obstructive pulmonary disease) ([address] 05/29/2021    Dyspnea 05/29/2021    Falls frequently 05/29/2021    Gait instability 05/29/2021    Hyperlipidemia associated with type 2 diabetes mellitus ([address] 05/29/2021    Morbid obesity ([address] 05/29/2021    CKD (chronic kidney disease) stage 3, GFR 30-59 ml/min 05/29/2021    Narcotic habituation, continuous ([address] 05/29/2021    Edentulous 05/29/2021    Right hemisphere, cerebral infarction ([address] 05/29/2021    Patent foramen ovale with atrial septal aneurysm 05/29/2021    Nephropathy 05/29/2021    Neuropathy 05/29/2021       Admit Date:  5/29/2021    Admission Problem List: Present on Admission:   Acute left-sided weakness   Hypertension   CAD (coronary artery disease)   History of heart artery stent   COPD (chronic obstructive pulmonary disease) ([address]   Dyspnea   Falls frequently   Gait instability   Hyperlipidemia associated with type 2 diabetes mellitus (HCC)   Morbid obesity (HCC)   CKD (chronic kidney disease) stage 3, GFR 30-59 ml/min   Narcotic habituation, continuous ([address]   Edentulous   Right hemisphere, cerebral infarction ([address]   Patent foramen ovale with atrial septal aneurysm   Nephropathy   Neuropathy   History of multiple strokes      Cardiac Specific Data:  Specialty Problems        Cardiology Problems    CAD (coronary artery disease)        CVA (cerebral vascular accident) ([address]        Hypertension        Patent foramen ovale with atrial septal aneurysm              Subjective:  Ms. Ana Pickard feels better, she denies any active chest pain or shortness of breath  She tells me this is her fourth stroke and she would like to avoid future episodes if possible    Objective:   BP (!) 146/94   Pulse 83   Temp 96 °F (35.6 °C) (Temporal)   Resp 16   Ht 5' 5\" (1.651 m)   Wt 225 lb (102.1 kg)   SpO2 96%   BMI 37.44 kg/m²       Intake/Output Summary (Last 24 hours) at 6/2/2021 1043  Last data filed at 6/1/2021 1935  Gross per 24 hour   Intake 570 ml   Output 850 ml   Net -280 ml       Prior to Admission medications    Medication Sig Start Date End Date Taking? Authorizing Provider   glipiZIDE (GLUCOTROL XL) 5 MG extended release tablet Take 5 mg by mouth daily   Yes Historical Provider, MD   furosemide (LASIX) 20 MG tablet Take 20 mg by mouth daily   Yes Historical Provider, MD   losartan (COZAAR) 100 MG tablet Take 100 mg by mouth daily   Yes Historical Provider, MD   oxyCODONE-acetaminophen (PERCOCET)  MG per tablet Take 1 tablet by mouth every 6 hours as needed for Pain. Yes Historical Provider, MD   potassium chloride (MICRO-K) 10 MEQ extended release capsule Take 10 mEq by mouth 2 times daily   Yes Historical Provider, MD   pregabalin (LYRICA) 150 MG capsule Take 150 mg by mouth 2 times daily. Yes Historical Provider, MD        sodium chloride flush  5-40 mL Intravenous 2 times per day    pregabalin  150 mg Oral BID    sodium chloride flush  5-40 mL Intravenous 2 times per day    aspirin  81 mg Oral Daily    insulin lispro  0-6 Units Subcutaneous TID WC    insulin lispro  0-3 Units Subcutaneous Nightly    [Held by provider] enoxaparin  1 mg/kg Subcutaneous Daily       TELEMETRY: Sinus     Physical Exam:    Physical Exam    General:  Awake, alert, NAD  Skin:  Warm and dry  Neck:  no jvd , no carotid bruits  Chest:  Clear to auscultation, no wheezing or rales  Cardiovascular:  RRR S3F9 no murmurs, clicks, gallups, or rubs  Abdomen:  Soft nontender, nondistended, bowel sounds present  Extremities:  Edema: Trace left Radial Artery withnl palpable pulses;  Hematoma: No  Neuro: Intact neurovascular function Yes    Lab Data:  CBC:   Recent Labs     05/31/21  0128 06/01/21  0328 06/02/21  0111   WBC 16.1* 12.5* 14.6*   HGB 13.0 14.6 13.6   HCT 40.6 48.2* 42.1   MCV 96.2 103.9* 96.3    333 374     BMP:   Recent Labs     06/01/21  0654 06/02/21  0111 06/02/21  0112    138 141   K 4.4 3.9 4.2    102 103   CO2 25 22 22   BUN 17 21 21   CREATININE 1.4* 1.9* 1.8*     LIVER PROFILE:   Recent Labs     06/02/21 0112   AST 26   ALT 10   BILITOT 0.3   ALKPHOS 135*     PT/INR:   Recent Labs     06/02/21 0112   PROTIME 13.6   INR 1.05     APTT: No results for input(s): APTT in the last 72 hours. BNP:  No results for input(s): BNP in the last 72 hours. CK, CKMB, Troponin: @LABRCNT (CKTOTAL:3, CKMB:3, TROPONINI:3)@    IMAGING:  XR CHEST PORTABLE    Result Date: 5/30/2021  Exam: XR CHEST PORTABLE - 5/30/2021 10:34 AM Indication: Leukocytosis Comparison: None available. Findings: Cardiomediastinal silhouette is within normal limits. No pleural effusion, pneumothorax, or focal consolidation. No acute osseous findings. Surgical clips in the LEFT upper abdomen. No acute findings. Signed by Dr Wilson James on 5/30/2021 11:54 AM    VL DUP CAROTID BILATERAL    Result Date: 5/31/2021  Vascular Carotid Procedure  Demographics   Patient Name    Adalid Hare Age                   63   Patient Number  [id number]       Gender                Female   Visit Number    [id number]    Interpreting          Fitz Sorenson MD                               Physician   Date of Birth   1955   Referring Physician   Shahab Estes   Accession       [id number]   [address] [facility], [place], [address]  Number  Procedure Type of Study:   Cerebral:Carotid, VL CAROTID BILATERAL. Indications for Study:Stroke. Risk Factors   - The patient's risk factor(s) include: diabetes mellitus and arterial     hypertension.   - The patient has a former tobacco history.   Impression   Duplex sonography with color flow enhancement was performed bilaterally on  the cervical carotid system. No evidence of hemodynamically significant  stenosis in the bilateral carotid and vertebral arteries. Signature   ----------------------------------------------------------------  Electronically signed by Nevin Puga MD(Interpreting  physician) on 05/31/2021 06:32 AM  ----------------------------------------------------------------  Blood Pressure:Right arm 136/82 mmHg. Left arm 134/80 mmHg. Velocities are measured in cm/s ; Diameters are measured in mm Carotid Right Measurements +------------+-------+-------+--------+-------+------------+---------------+ ! Location    ! PSV    ! EDV    ! Angle   ! RI     !%Stenosis   ! Tortuosity     ! +------------+-------+-------+--------+-------+------------+---------------+ ! Prox CCA    !88     !27.5   !60      !0.69   !            !               ! +------------+-------+-------+--------+-------+------------+---------------+ ! Mid CCA     !77     !25.1   ! 60      !0.67   !            !               ! +------------+-------+-------+--------+-------+------------+---------------+ ! Prox ICA    !63.6   !23.6   ! 60      !0.63   !            !               ! +------------+-------+-------+--------+-------+------------+---------------+ ! Mid ICA     !72.3   !30.6   ! 60      !0.58   !            !               ! +------------+-------+-------+--------+-------+------------+---------------+ ! Dist ICA    !83.3   !25.9   !60      !0.69   !            !               ! +------------+-------+-------+--------+-------+------------+---------------+ ! Prox ECA    !67.6   !14.9   !60      !0.78   !            !               ! +------------+-------+-------+--------+-------+------------+---------------+ ! Vertebral   !65.3   !19.7   !56      !0.7    ! !               ! +------------+-------+-------+--------+-------+------------+---------------+   - There is antegrade vertebral flow noted on the right side.    - Additional frontal, LEFT parietal, and RIGHT occipital areas of encephalomalacia likely representing old infarcts. Chronic microvascular ischemic white matter change. Global cerebral volume loss. No midline shift or mass effect. Lateral ventricles are nondilated. Basilar cisterns are patent. Sella turcica and its contents appear unremarkable. No acute orbital finding. Mastoid air cells and paranasal sinuses are clear. 1.  Small acute infarcts in the RIGHT periventricular white matter and RIGHT basal ganglia. 2.  Multiple bilateral remote infarcts. Signed by Dr Go Raymond on 5/29/2021 2:43 PM    ECHO 2D WO COLOR DOPPLER COMPLETE    Result Date: 5/29/2021  Transthoracic Echocardiography Report (TTE)  Demographics   Patient Name  Erik Do Date of Study         05/29/2021   MRN           [id number]       Gender                Female   Date of Birth 1955   Room Number           OWZ-0030   Age           63 year(s)   Height:       65 inches    Referring Physician   Gus Ribeiro MD   Weight:       220 pounds   Sonographer           Loly Beck RDCS   BSA:          2.06 m^2     Interpreting          So De Oliveira MD                             Physician   BMI:          36.61 kg/m^2  Procedure Type of Study   TTE procedure:ECHOCARDIOGRAM COMPLETE 2D WO COLOR DOPPLER. Study Location: Echo Lab Technical Quality: Adequate visualization Patient Status: Inpatient Contrast Medium: Bubble Study. Indications:Patent foramen ovale (PFO). Conclusions   Summary  Normal size left atrium. positive bubble study  Normal right atrial dimension with no evidence of thrombus or mass noted. Normal right ventricular size with preserved RV function. Normal right ventricular size with preserved RV function. pfo present  Normal right ventricular size with preserved RV function. pfo present. large shunt. aneurysmal ias. No evidence of significant pericardial effusion is noted. No evidence of pleural effusion. Structurally normal mitral valve with normal leaflet mobility. No evidence  of mitral valve stenosis or mild mitral regurgitation. Aortic valve appears to be tricuspid. Structurally normal aortic valve. No significant aortic regurgitation or stenosis is noted. Tricuspid valve is structurally normal.  No evidence of tricuspid regurgitation. Signature     ----------------------------------------------------------------    Electronically signed by Elzbieta Boykin MD(Interpreting  physician) on 05/29/2021 03:38 PM  ----------------------------------------------------------------     Findings   Mitral Valve  Structurally normal mitral valve with normal leaflet mobility. No evidence  of mitral valve stenosis or mild mitral regurgitation. Aortic Valve  Aortic valve appears to be tricuspid. Structurally normal aortic valve. No significant aortic regurgitation or stenosis is noted. Tricuspid Valve  Tricuspid valve is structurally normal.  No evidence of tricuspid regurgitation. Left Atrium  Normal size left atrium. positive bubble study   Left Ventricle  Normal left ventricular size with preserved LV function and an estimated  ejection fraction of approximately 55-60%. No evidence of left ventricular mass or thrombus noted. Right Atrium  Normal right atrial dimension with no evidence of thrombus or mass noted. Right Ventricle  Normal right ventricular size with preserved RV function. pfo present. large shunt. aneurysmal ias. Pericardial Effusion  No evidence of significant pericardial effusion is noted. Pleural Effusion  No evidence of pleural effusion.   M-Mode Measurements (cm)   LVIDd: 4.45 cm                       LVIDs: 3.29 cm  IVSd: 1.01 cm  LVPWd: 1.02 cm                       AO Root Dimension: 3.1 cm  % Ejection Fraction: 51 %            LA: 3 cm                                       LVOT: 1.9 cm  Doppler Measurements:   AV Peak Velocity:113 cm/s           MV Peak E-Wave: 50.4 cm/s  AV Peak Gradient: 5.11 mmHg         MV Peak A-Wave: 61.4 cm/s                                      MV E/A Ratio: 0.82 %                                      MV Peak Gradient: 1.02 mmHg       Assessment:  1. Recurrent stroke . Patient mentioned that this is her fourth stroke  2. DM  3. HTN  4. COPD        Recommendations:    Patient has been at this time from outside hospital with recurrent stroke, this is her fourth episode, 2D echo showed evidence of patent foramen ovale with atrial septal aneurysm  I was consulted for consideration of PFO closure, I believe that PFO closure will provide her with superior protection compared to oral anticoagulation without risk of bleeding in the future, patient agreed to proceed with PFO closure, will plan for procedure tomorrow  No evidence of A. fib during this admission or during prior admissions  We will hold Eliquis    -----------------------    Addendum:  ----------------     Risks, benefits, alternatives of transcatheter PFO closure discussed with the patient   I explained the procedure to the patient in detail and fully informed consent obtained.   Acceptable Mallampati score  Consent for general anesthesia will be obtained by anesthesia team  ASA 3    Aric Pearl MD, MD 6/2/2021 10:43 AM       Aric Pearl MD, [facility]  Interventional Cardiologist, Endovascular Specialist   Medical Director, [facility]

## 2021-06-01 NOTE — PROGRESS NOTES
Daily Progress Note    Date:2021  Patient: Lesa Banda  : 1955  WOU:[id number]  CODE:Full Code No additional code details  PCP:No primary care provider on file. Admit Date: 2021  2:12 AM   LOS: 3 days       Subjective:    No acute events overnight. Improvement in left-sided weakness noted. No new focal weakness. Speech fluent. Hospital course: 79-year-old female with history of multiple bilateral strokes, hypertension, hyperlipidemia, diabetes, cardiovascular disease, presented as transfer from outside facility due to concern for another stroke with acute severe left-sided weakness and dysarthria. Echo showed positive bubble study with PFO and septal aneurysm. She previously had right-sided weakness prior to presentation, which has improved. MRI brain here shows acute infarcts in right periventricular white matter and right basal ganglia as well as old bilateral remote infarcts. Given recurrent embolic strokes with PFO started on therapeutic anticoagulation with Lovenox. Cardiology on board with consideration for PFO closure. Held Plavix to allow washout for PFO closure procedure. Cardiology with plan for PFO closure on .         Review of Systems    Comprehensive ROS completed and is negative except as otherwise noted      Objective:      Vital signs in last 24 hours:  Patient Vitals for the past 24 hrs:   BP Temp Temp src Pulse Resp SpO2 Weight   21 0730 -- -- -- -- -- -- 221 lb 6 oz (100.4 kg)   21 0639 129/89 96.2 °F (35.7 °C) Temporal 81 16 94 % --   21 0026 120/75 97.6 °F (36.4 °C) Temporal 94 16 93 % --   21 1901 114/79 97.3 °F (36.3 °C) Temporal 97 16 92 % --   21 1533 126/82 -- -- 76 18 -- --   21 1432 (!) 129/94 96.2 °F (35.7 °C) Temporal 91 18 94 % --     Physical exam    General: No acute distress, obese, alert  Head: Normocephalic, atraumatic  Neck: Supple, nontender, trachea midline  Cardiovascular: Normal rate, pulses equal  Respiratory: Lungs clear to auscultation bilaterally  Abdomen: Soft, nontender, bowel sounds present  Extremities: No clubbing, cyanosis or edema  Skin: Warm and dry, no rashes  Neurologic: Mild left-sided weakness-improving, follows commands, no speech difficulty        Lab Review   Recent Results (from the past 24 hour(s))   POCT Glucose    Collection Time: 05/31/21 11:31 AM   Result Value Ref Range    POC Glucose 207 (H) 70 - 99 mg/dl    Performed on AccuChek    POCT Glucose    Collection Time: 05/31/21  5:13 PM   Result Value Ref Range    POC Glucose 127 (H) 70 - 99 mg/dl    Performed on AccuChek    POCT Glucose    Collection Time: 05/31/21  8:00 PM   Result Value Ref Range    POC Glucose 204 (H) 70 - 99 mg/dl    Performed on AccuChek    CBC Auto Differential    Collection Time: 06/01/21  3:28 AM   Result Value Ref Range    WBC 12.5 (H) 4.8 - 10.8 K/uL    RBC 4.64 4.20 - 5.40 M/uL    Hemoglobin 14.6 12.0 - 16.0 g/dL    Hematocrit 48.2 (H) 37.0 - 47.0 %    .9 (H) 81.0 - 99.0 fL    MCH 31.5 (H) 27.0 - 31.0 pg    MCHC 30.3 (L) 33.0 - 37.0 g/dL    RDW 14.1 11.5 - 14.5 %    Platelets 538 637 - 902 K/uL    MPV 10.7 9.4 - 12.3 fL    PLATELET SLIDE REVIEW Adequate     Neutrophils % 24.0 (L) 50.0 - 65.0 %    Lymphocytes % 60.0 (H) 20.0 - 40.0 %    Monocytes % 1.0 0.0 - 10.0 %    Eosinophils % 9.0 (H) 0.0 - 5.0 %    Basophils % 1.0 0.0 - 1.0 %    Neutrophils Absolute 3.1 1.5 - 7.5 K/uL    Immature Granulocytes # 0.0 K/uL    Lymphocytes Absolute 7.9 (H) 1.1 - 4.5 K/uL    Monocytes Absolute 0.10 0.00 - 0.90 K/uL    Eosinophils Absolute 1.13 (H) 0.00 - 0.60 K/uL    Basophils Absolute 0.10 0.00 - 0.20 K/uL    Atypical Lymphocytes Relative 3 0 - 8 %    Myelocyte Percent 1 (A) %    Unid. Mononu 1 (A) %    Macrocytes 1+ (A)     Polychromasia 1+ (A)     Hypochromia 1+ (A)     Ovalocytes Occasional (A)    SPECIMEN REJECTION    Collection Time: 06/01/21  5:10 AM   Result Value Ref Range    Rejected Test BMPX     Reason for Rejection see below    SPECIMEN REJECTION    Collection Time: 06/01/21  6:20 AM   Result Value Ref Range    Rejected Test BMPX     Reason for Rejection see below    Basic Metabolic Panel w/ Reflex to MG    Collection Time: 06/01/21  6:54 AM   Result Value Ref Range    Sodium 140 136 - 145 mmol/L    Potassium reflex Magnesium 4.4 3.5 - 5.0 mmol/L    Chloride 104 98 - 111 mmol/L    CO2 25 22 - 29 mmol/L    Anion Gap 11 7 - 19 mmol/L    Glucose 124 (H) 74 - 109 mg/dL    BUN 17 8 - 23 mg/dL    CREATININE 1.4 (H) 0.5 - 0.9 mg/dL    GFR Non- 38 (A) >60    GFR  46 (L) >59    Calcium 9.3 8.8 - 10.2 mg/dL   POCT Glucose    Collection Time: 06/01/21  8:03 AM   Result Value Ref Range    POC Glucose 110 (H) 70 - 99 mg/dl    Performed on AccuChek        I/O last 3 completed shifts: In: 360 [P.O.:360]  Out: 900 [Urine:900]  No intake/output data recorded.       Current Facility-Administered Medications:     famotidine (PEPCID) injection 20 mg, 20 mg, Intravenous, Daily, Ivania English MD, 20 mg at 06/01/21 0804    oxyCODONE-acetaminophen (PERCOCET)  MG per tablet 1 tablet, 1 tablet, Oral, Q4H PRN, Ivania English MD, 1 tablet at 06/01/21 0758    pregabalin (LYRICA) capsule 150 mg, 150 mg, Oral, BID, Ivania English MD, 150 mg at 06/01/21 0813    [Held by provider] losartan (COZAAR) tablet 100 mg, 100 mg, Oral, Daily, Ivania English MD    sodium chloride flush 0.9 % injection 5-40 mL, 5-40 mL, Intravenous, 2 times per day, Ivania English MD, 10 mL at 06/01/21 0759    sodium chloride flush 0.9 % injection 5-40 mL, 5-40 mL, Intravenous, PRN, Ivania English MD, 10 mL at 05/29/21 0503    0.9 % sodium chloride infusion, 25 mL, Intravenous, PRN, Ivania English MD    polyethylene glycol (GLYCOLAX) packet 17 g, 17 g, Oral, Daily PRN, Ivania English MD    acetaminophen (TYLENOL) tablet 650 mg, 650 mg, Oral, Q6H PRN **OR** acetaminophen (TYLENOL) suppository 650 mg, 650 mg, Rectal, Q6H PRN, Laura Greenwood MD    potassium chloride (KLOR-CON M) extended release tablet 40 mEq, 40 mEq, Oral, PRN **OR** potassium bicarb-citric acid (EFFER-K) effervescent tablet 40 mEq, 40 mEq, Oral, PRN **OR** potassium chloride 10 mEq/100 mL IVPB (Peripheral Line), 10 mEq, Intravenous, PRN, Laura Greenwood MD    potassium chloride 10 mEq/100 mL IVPB (Peripheral Line), 10 mEq, Intravenous, PRN, Laura Greenwood MD    magnesium sulfate 2000 mg in 50 mL IVPB premix, 2,000 mg, Intravenous, PRN, Laura Greenwood MD    ondansetron (ZOFRAN) injection 4 mg, 4 mg, Intravenous, Q6H PRN, Laura Greenwood MD    aspirin chewable tablet 81 mg, 81 mg, Oral, Daily, Laura Greenwood MD, 81 mg at 06/01/21 0758    HYDROmorphone HCl PF (DILAUDID) injection 1 mg, 1 mg, Intravenous, Q4H PRN, Laura Greenwood MD, 1 mg at 05/31/21 0115    insulin lispro (HUMALOG) injection vial 0-6 Units, 0-6 Units, Subcutaneous, TID WC, Laura Greenwood MD, 2 Units at 05/31/21 1216    insulin lispro (HUMALOG) injection vial 0-3 Units, 0-3 Units, Subcutaneous, Nightly, Laura Greenwood MD, 1 Units at 05/31/21 2048    glucose (GLUTOSE) 40 % oral gel 15 g, 15 g, Oral, PRN, Laura Greenwood MD    dextrose 50 % IV solution, 12.5 g, Intravenous, PRN, Laura Greenwood MD    glucagon (rDNA) injection 1 mg, 1 mg, Intramuscular, PRN, Laura Greenwood MD    dextrose 5 % solution, 100 mL/hr, Intravenous, PRN, Laura Greenwood MD    enoxaparin (LOVENOX) injection 100 mg, 1 mg/kg, Subcutaneous, Daily, Darlene Lincoln MD, 100 mg at 06/01/21 7530        Assessment/plan  Principal Problem:    Right hemisphere, cerebral infarction [facility])  Active Problems:    Acute left-sided weakness    Hypertension    CAD (coronary artery disease)    History of heart artery stent    COPD (chronic obstructive pulmonary disease) ([facility] [address]    Dyspnea    Falls frequently    Gait instability    Hyperlipidemia associated with type 2 diabetes mellitus (HCC)    Morbid obesity (HCC)    CKD (chronic kidney disease) stage 3, GFR 30-59 ml/min    Narcotic habituation, continuous (HCC)    Edentulous    Patent foramen ovale with atrial septal aneurysm    Nephropathy    Neuropathy    History of multiple strokes  Resolved Problems:    * No resolved hospital problems. *      Acute right hemisphere strokes, small acute infarcts in right periventricular white matter and right basal ganglia  History of multiple bilateral strokes  -Continue neurochecks  -Allowed permissive hypertension  -PFO with septal aneurysm confirmed on echo with positive bubble study   -Carotid Dopplers without significant stenosis  -Continue aspirin  -Anticoagulated on Lovenox, hold dose prior to procedure  -held Plavix to allow washout prior to potential PFO closure  --- Tentative plan for PFO closure per cardiology 6/2      PT/OT/speech    Diabetes mellitus  Glucose monitoring with sliding scale insulin correction if needed    Hypertension  Held losartan, allowing permissive hypertension     Leukocytosis, ? Reactive, low suspicion for infection  --Downtrended  No evidence of infection on UA, No acute findings on chest x-ray  Procalcitonin negative    Disposition: Pending rehab assessment following PFO closure procedure. Suspect may need SNF.   Plan to eventually be discharged on Zio patch for outpatient cardiac monitoring to rule out A. fib      Lissy Randolph MD 6/1/2021 9:49 AM

## 2021-06-01 NOTE — PROGRESS NOTES
Speech Language Pathology  Facility/Department: [facility] PROGRESSIVE CARE  Initial Speech/Language/Cognitive Assessment  Swallow Therapy     NAME: Rohit Flores  : 1955   MRN: [id number]  ADMISSION DATE: 2021  ADMITTING DIAGNOSIS: has Acute left-sided weakness; Hypertension; CAD (coronary artery disease); History of heart artery stent; COPD (chronic obstructive pulmonary disease) ([facility] [address] Dyspnea; Falls frequently; Gait instability; Hyperlipidemia associated with type 2 diabetes mellitus ([facility] [address] Morbid obesity ([facility] [address] CKD (chronic kidney disease) stage 3, GFR 30-59 ml/min; Narcotic habituation, continuous ([facility] [address] Edentulous; Right hemisphere, cerebral infarction ([facility] [address] Patent foramen ovale with atrial septal aneurysm; Nephropathy; Neuropathy; and History of multiple strokes on their problem list.    Date of Eval/Treat: 2021   Evaluating Therapist: ANNABELLA Harper    Assessment:  Completed assessment. Patient exhibited mildly slowed, decreased lingual movements during verbalizations. SLP still ranked functional intelligibility of speech for unfamiliar listeners at 100% in utterances with background noise present. Patient also exhibited slow processing, delayed auditory comprehension (particularly as length and complexity of information increases), mildly delayed verbalizations, and decreased immediate/short-term memory. It is noted that during evaluation, patient demonstrated ability to verbalize current PCP and pharmacy at independent level. Patient demonstrated ability to verbalize appropriate simple solutions to situations that could occur during activities of daily living independently (ie. 911, acid reflux, burns, cuts, falls, fever, fire, headache). Also re-assessed patient's swallowing function. Patient exhibited slow, decreased oral prep of more solid consistencies as well as sluggish, moderate-severely decreased laryngeal elevation for swallow airway protection.  Even so, no outward S/S penetration/aspiration was observed with any regular solid consistency trial or thin H2O presentation administered during treatment session this date. It is noted that when asked, patient requested to continue softer foods secondary to dentition. At this time, would recommend continuation soft and bite sized consistency with thin liquids. Will continue to follow. Goals:  Short-term Goals  Timeframe for Short-term Goals: 1-2x/day for 3 days  Goal 1: Patient will tolerate soft and bite sized consistency and thin liquids with min S/S penetration/aspiration during PO intake. Goal 2: Patient staff will follow swallow safety recommendations to decrease risk of penetration/aspiration during PO intake. Goal 3: Monitor [id number] functioning. Subjective:  Chart Reviewed: Yes  Patient assessed for rehabilitation services?: Yes  Family / Caregiver Present: No     Objective:  Oral Motor:   Labial ROM: Decreased, on the right, during labial retraction trials and labial protrusion trials. Labial Strength: Adequate during labial compression trials. Labial Coordination: Slowed volitional movements were noted. Lingual ROM: Adequate during lingual extension trials with full point achieved; decreased during lingual elevation trials without use of accessory jaw movement; adequate movements noted bilaterally. Lingual Symmetry: Deviation was noted, to the left, during lingual extension trials. Lingual Strength: Decreased   Lingual Coordination: Slowed movements were noted. Auditory Comprehension  Comprehension:  (Patient demonstrated ability to answer simple yes/no questions regarding immediate environment and current state of being at independent level and with adequate processing speed. Patient demonstrated ability to follow simple 1 step commands independently and with adequate processing speed.  While delayed, patient demonstrated ability to answer yes/no questions of increased complexity and to follow complex 1 and simple 2 step commands at independent level.)      Expression  Primary Mode of Expression:  (Confrontation naming of items in room was considered to be [facility]. Structured responsive speech and responses in natural conversation were considered to be mildly delayed but appropriate.)     Motor Speech:  (Patient exhibited mildly slowed, decreased lingual movements during verbalizations. SLP still ranked functional intelligibility of speech for unfamiliar listeners at 100% in utterances with background noise present.)     Overall Orientation Status:  (Patient demonstrated ability to verbalize name, birthday, age, address, phone number, city, state, hospital,  and year at independent level. Patient did not verbalize month, BLANCA, or date independently.)     Attention:  (Patient demonstrated appropriate select and sustained attention during assessment.)     Memory:  (Patient demonstrated decreased immediate memory with sequences of unrelated numbers/words set up to 5 items without repetitions provided. Patient demonstrated decreased short-term memory with less than 5 minute delay+distractions present.)     Problem Solving:  (It is noted that during evaluation, patient demonstrated ability to verbalize current PCP and pharmacy at independent level. Patient demonstrated ability to verbalize appropriate simple solutions to situations that could occur during activities of daily living independently (ie. 911, acid reflux, burns, cuts, falls, fever, fire, headache). Additional Assessment:   Re-assessed patient's swallowing function. With regular solid consistency trials presented independently, patient exhibited slow vertical jaw movement during oral prep. Oral transit of regular solid consistency primarily measured 1-2 seconds in length and min oral cavity residue was noted post swallows; residue cleared from the mouth with additional dry swallows.  Oral transit thin H2O presentations, administered independently via straw, primarily measured 1-2 seconds in length. Laryngeal elevation during swallow initiation was considered to be sluggish and moderately-severely decreased for swallow airway protection. Even so, no outward S/S penetration/aspiration was observed with any regular solid consistency trial or thin H2O presentation administered during treatment session this date. It is noted that when asked, patient requested to continue softer foods secondary to dentition. At this time, would recommend continuation soft and bite sized consistency with thin liquids. Will continue to follow.     Electronically signed by ANNABELLA Loredo on 6/1/2021 at 2:00 PM

## 2021-06-01 NOTE — CARE COORDINATION
Date / Time of Evaluation: 6/1/2021 2:02 PM  Assessment Completed by: Suzy Benitez    Patient Admission Status: Inpatient [101]    [phone] (home)   Telephone Information:   Mobile [phone]       (Best Practice:  Have patient / caregiver verify above address and phone number by stating out loud their current address and reachable phone number.)  Is above information correct? yes      Current PCP:  Bria Sher MD  PCP verified? No PCP listed but pt reports established at Dr Kyrie Black office in [place]. SW added to the pt's chart. Initial Assessment Completed at bedside with:  Pt resting in bed    Emergency Contacts:  Extended Emergency Contact Information  Primary Emergency Contact: Odessa Hart  Mobile Phone: [phone]  Relation: Child    Advance Directives: Code Status:  Full Code    Financial:  Payor: Rosalba Srinivasan / Plan: Suzanella Craig  / Product Type: *No Product type* /     Pre-Cert required for SNF:  yes    Have [address]:  None at this time    Pharmacy:   [address] [place] [address]  [phone] - F [phone]  [address] [address]  Phone: [phone] Fax: [phone]      Potential assistance purchasing medications? No pt reports affordable copays for meds/services through her insurance    ADLS:  Support System: Son    Current Home Environment:  Resides at home and her son resides in the home with her as CG  Steps:  N/a    Plans to RETURN to current housing: yes  Barriers to RETURNING to current housing:  Pt does not think SNF therapy is necessary and refuses choice list for services. Pt is agreeable to [facility] services following her cardiac procedure and upon dc. Currently ACTIVE with Home Health CARE:  None pta but agreeable to services upon dc.   Home Health Care Agency:  n/a    DME Provider:  none    Has a pulse oximetry unit at home: no    Had [address] [employer] [facility] prior to

## 2021-06-02 PROBLEM — Z87.74 STATUS POST PATENT FORAMEN OVALE CLOSURE: Status: ACTIVE | Noted: 2021-01-01

## 2021-06-02 NOTE — PROGRESS NOTES
Pt came back from PFO procedure, vitals remains baseline, pt is A/OX4, but sleepy. R groin site dry, clean without bleeding, drainage, hematoma. R pedal pulse, tibial pulse 2+ with doppler. Bladder scan shows 26 ml residual. Will continue to monitor.

## 2021-06-02 NOTE — OP NOTE
Operative Note      Patient: Luisito Bianchi  YOB: 1955  MRN: [id number]    Date of Procedure:  6/2/2021    PFO Percutaneous Closure Procedure Note      Pre-Procedure Diagnosis: H/o recurrent CVA, PFO      Post-Procedure Diagnosis: Same      :   Sahil Augustine MD, [facility]     PROCEDURES PERFORMED:   RAMON   Implant of an Amplatzer PFO Closure Device      Anesthesia Used: local 2% lidocaine, General anesthesia   Estimated Blood Loss: <30 mL   Condition: stable   IV Contrast Used: None   Anticoagulation: Heparin 85944 units IV   Complications: none   Specimen Removed: None      Implant: 35 mm Amplatzer PFO Septal Occluder     DESCRIPTION OF THE PROCEDURE:   The procedure was described to the patient including benefits, risks, and alternatives to the procedure. The patient confirmed understanding. The patient signed the informed consent. The patient was brought into the operating room. Bilateral groins were prepped in a sterile fashion, and a sterile drape was placed over the patient.      Using the modified Seldinger technique under US guidance, and a micro-puncture needle, vascular access was obtained to the right femoral vein and an  [employer] 11 cm sheath was introduced in the right femoral vein. Anticoagulation with Heparin was initiated. ACTs were performed and adjustments in heparin anticoagulation were made as necessary.     A 6F MP A1 catheter was used to guide a 0.35'' Glide wire into the left atrium and positioned inside the left upper pulmonary vein. Then the 0.35'' wire was removed and exchanged for a Amplatzer super stiff 0.35'' wire which was positioned in the left upper pulmonary vein.      A 35 mm Amplatzer PFO Septal Occluder was prepared under saline solution in a water sealed fashion and continuous flush was utilized to remove any residual bubble.  The device was then loaded into the transseptal sheath and advanced into the left atrium over the Amplatzer super stiff 0.35'' wire. Under RAMON guidance, the left atrial disk was deployed. The device was then pulled against the interatrial septum to confirm adequate position. Next, the right atrial disk was deployed.      Confirmation of cessation of flow was performed by RAMON. Adequate positioning of the device was confirmed by pulling and pushing forward against the septum. The device was then released by unscrewing it with counterclockwise rotation. The device delivery system was removed and 8 figure suture applied to the skin with manual compression.     The patient was in hemodynamically stable condition throughout the procedure.      Conclusion and recommendations   ------------------------------     Patient will be given Plavix 300 mg for loading dose. To be continued on a daily dose of Plavix 75 mg and ASA 81 mg for total of 6 months than Aspirin alone indefinitely. Infectious Endocarditis prophylaxis with antibiotics for 6 months   Transfer to telemetry floor for routine post-op. Care   possible discharge in am if stable     Monitor the patient overnight in Telemetry Unit for hemodynamic status and groin complications.    2 D echo will be obtained in am.         Luis Resendez MD, [facility]  Interventional Cardiologist, Endovascular Specialist   Medical Director, Structural Heart Program   [facility]       Electronically signed by Luis Resendez MD on 6/2/2021 at 4:29 PM

## 2021-06-02 NOTE — ANESTHESIA POSTPROCEDURE EVALUATION
Department of Anesthesiology  Postprocedure Note    Patient: Diann Steward  MRN: [id number]  YOB: 1955  Date of evaluation: 6/2/2021  Time:  12:21 PM     Procedure Summary     Date: 06/02/21 Room / Location: [facility] CATH LAB    Anesthesia Start: 1106 Anesthesia Stop: [phone]    Procedure: CATH LAB WITH ANESTHESIA Diagnosis:     Scheduled Providers: ALEXYS Valdez CRNA Responsible Provider: ALEXYS Valdez CRNA    Anesthesia Type: general ASA Status: 3          Anesthesia Type: general    Carey Phase I:      Carey Phase II:      Last vitals: Reviewed and per EMR flowsheets.        Anesthesia Post Evaluation    Patient location during evaluation: PACU  Patient participation: complete - patient participated  Level of consciousness: obtunded/minimal responses  Pain score: 0  Airway patency: patent  Nausea & Vomiting: no nausea and no vomiting  Complications: no  Cardiovascular status: hemodynamically stable and blood pressure returned to baseline  Respiratory status: nasal cannula  Hydration status: euvolemic

## 2021-06-02 NOTE — PROGRESS NOTES
Hematoma noted at R groin site, manual pressure hold for 20 min, hematoma reoccurred later. R pedal, tibial pulse 2+ with Doppler. Notified Dr. Amy Finley. Received order for stat H&H. Bladder scan shows 900 ml retention. Pt c/o severe pain to her abd. Spoke to Dr. Sobia Saul on the phone at [phone] regarding pt's status. KUB result reviewed per Dr. Sobia Saul, pt had bowl obstruction. Received order for General Surgery consul, insert emanuel catheter, keep pt NPO. Will monitor for another hour, pending H&H, possible transfer to ICU per Dr. Sobia Saul. Manual pressure hold for another 15 min, hematoma resolved.

## 2021-06-02 NOTE — PROGRESS NOTES
Received call from Dr. Nnamdi Robles, will review pt's chart and call back in 15 min. Received call from Dr. Zuleta Hands at [phone], no indication for surgical intervention from general standpoint. Large amount of stool noted from Xray. Notified Dr. Soha Randolph per Gustavo Hughes.

## 2021-06-02 NOTE — PROGRESS NOTES
Speech Language Pathology  Facility/Department: [facility] 7 PROGRESSIVE CARE  Daily Treatment Note  NAME: Дмитрий Barragan  : 1955  MRN: [id number]  Date of Eval: 2021  Evaluating Therapist: Elvin Chris MS CCC-SLP    Patient Diagnosis(es):   has Acute left-sided weakness; Hypertension; CAD (coronary artery disease); History of heart artery stent; COPD (chronic obstructive pulmonary disease) ([address] Dyspnea; Falls frequently; Gait instability; Hyperlipidemia associated with type 2 diabetes mellitus ([address] Morbid obesity ([address] CKD (chronic kidney disease) stage 3, GFR 30-59 ml/min; Narcotic habituation, continuous ([address] Edentulous; Right hemisphere, cerebral infarction ([address] Patent foramen ovale with atrial septal aneurysm; Nephropathy; Neuropathy; and History of multiple strokes on their problem list.    History:  Per Neurology: Brent Eduardo a 63y.o. year old woman with a history of hypertension, hyperlipidemia, diabetes, cardiovascular disease, and previous strokes who was transferred from [facility]  after having yet another stroke. Her left sided weakness has improved. She is doing well with PT and OT.            MRI:  Impression   1.  Small acute infarcts in the RIGHT periventricular white matter and   RIGHT basal ganglia. 2.  Multiple bilateral remote infarcts. Signed by Dr Griselda Soliz on 2021 2:43 PM       CXR:  Findings:   Cardiomediastinal silhouette is within normal limits. No pleural effusion, pneumothorax, or focal consolidation. No acute osseous findings. Surgical clips in the LEFT upper abdomen.       Impression   No acute findings. Signed by Dr Griselda Soliz on 2021 11:54 AM             The  [address] Mental Status Examination or SLUMS was completed.  The  [address] Mental Status Examination or SLUMS consists of 11 items, and measures aspects of cognition that include orientation, short-term memory, calculations, the naming of animals, the clock drawing test, and recognition of geometric figures. Scores range from 0 to 30. Scores of 27 to 30 are considered normal in a person with a high school education. Scores between 21 and 26 suggest a mild neurocognitive disorder. Scores between 0 and 20 indicate dementia. The patient scored an 11 out of 30 possible points which is in the scoring range for dementia. SLUMS  Orientation 2/3   Word Recall 0/5   Mental Math 1/3   Divergent Naming 0/3   Reverse Recall 2/2   Clock Drawing 0/4   Following Directions 2/2   Story Recall 4/8   Total Score 11/30       Very mild dysarthria was noted this date. She was able to recall discussing the possibility of admission to inpatient rehab. She is currently NPO for PFO closure today. No p.o. trials were presented today d/t procedure. She was assessed by speech on 6/1/21 and was recommended to receive a soft and bite sized diet with thin liquids. At that time, no overt s/s of aspiration were observed with thin or solids at the bedside. Yesterday she did well with expressive language, receptive language and cognitive tasks.       Pain:  Pain Assessment  Pain Assessment: 0-10  Pain Level: 8  Patient's Stated Pain Goal: No pain  Pain Type: Chronic pain  Pain Location: Back  Pain Orientation: Right  Pain Radiating Towards: na  Pain Descriptors: Sharp, Constant  Pain Frequency: Intermittent  Pain Onset: On-going  Clinical Progression: Not changed (pt medicated with dilaudid as per orders)  Functional Pain Assessment: Prevents or interferes some active activities and ADLs  Non-Pharmaceutical Pain Intervention(s): Ambulation/Increased Activity, Repositioned  Response to Pain Intervention: Patient Satisfied  Multiple Pain Sites: No  RASS Score: Alert and calm  POSS Score (Patient Ctrl Analgesia): 1  Pain Assessment/FLACC  Pain Rating: FLACC (rest) - Face: no particular expression or smile  Pain Rating: FLACC (rest) - Legs: normal position or relaxed  Pain Rating: FLACC (rest) - Activity: lying quietly, normal position, moves easily  Pain Rating: FLACC (rest) - Cry: no cry (awake or asleep)  Pain Rating: FLACC (rest) - Consolability: content, relaxed  Score: FLACC (rest): 0    Patient/Family/Caregiver Education:  Discussed assessment and results. She is in agreement with the plan of care. Impressions:  Mild dysarthria was noted during conversation and structured tasks. The patient demonstrated difficulty with word recall after a delay and distraction. She also exhibited difficulty with mental math. The patient would benefit from continued therapy after discharge from acute care. She would be a good candidate for continued therapy with inpatient rehab. Goals:  Short-term Goals  Timeframe for Short-term Goals: 1-2x/day for 3 days  Goal 1: Patient will tolerate soft and bite sized consistency and thin liquids with min S/S penetration/aspiration during PO intake. Goal 2: Patient staff will follow swallow safety recommendations to decrease risk of penetration/aspiration during PO intake. Goal 3: The patient will complete concrete and divergent naming tasks with minimal cues and 100% accuracy. Goal 4: The patient will complete short term recall tasks with minimal cues and 100% accuracy. Goal 5: The patient will complete tasks for working memory with minimal cues and 100% accuracy.       Plan:    Continued daily Speech/Language treatment with goals per plan of care.           Electronically Signed By:  Dao Christie M.S., CCC-SLP  6/2/2021,10:05 AM.

## 2021-06-02 NOTE — ANESTHESIA PRE PROCEDURE
Department of Anesthesiology  Preprocedure Note       Name:  Diann Steward   Age:  63 y.o.  :  1955                                          MRN:  [id number]         Date:  2021      Surgeon: * No surgeons listed *    Procedure: * No procedures listed *    Medications prior to admission:   Prior to Admission medications    Medication Sig Start Date End Date Taking? Authorizing Provider   glipiZIDE (GLUCOTROL XL) 5 MG extended release tablet Take 5 mg by mouth daily   Yes Historical Provider, MD   furosemide (LASIX) 20 MG tablet Take 20 mg by mouth daily   Yes Historical Provider, MD   losartan (COZAAR) 100 MG tablet Take 100 mg by mouth daily   Yes Historical Provider, MD   oxyCODONE-acetaminophen (PERCOCET)  MG per tablet Take 1 tablet by mouth every 6 hours as needed for Pain. Yes Historical Provider, MD   potassium chloride (MICRO-K) 10 MEQ extended release capsule Take 10 mEq by mouth 2 times daily   Yes Historical Provider, MD   pregabalin (LYRICA) 150 MG capsule Take 150 mg by mouth 2 times daily.    Yes Historical Provider, MD       Current medications:    Current Facility-Administered Medications   Medication Dose Route Frequency Provider Last Rate Last Admin    0.9 % sodium chloride infusion   Intravenous Continuous Sharlotte ALEXYS Houston - CNP 75 mL/hr at 21 0745 New Bag at 21 0745    sodium chloride flush 0.9 % injection 5-40 mL  5-40 mL Intravenous 2 times per day ALEXYS Oneal - CNP        sodium chloride flush 0.9 % injection 5-40 mL  5-40 mL Intravenous PRN ALEXYS Gutierrez - CNP        0.9 % sodium chloride infusion  25 mL Intravenous PRN ALEXYS Gutierrez - CNP        oxyCODONE-acetaminophen (PERCOCET)  MG per tablet 1 tablet  1 tablet Oral Q4H PRN Baron Vidya MD   1 tablet at 2145    pregabalin (LYRICA) capsule 150 mg  150 mg Oral BID Baron Vidya MD   150 mg at 21 0745    sodium chloride flush 0.9 % injection 5-40 mL  5-40 mL Intravenous 2 times per day Will MD Nazia   10 mL at 06/01/21 2029    sodium chloride flush 0.9 % injection 5-40 mL  5-40 mL Intravenous PRN Will MD Nazia   10 mL at 05/29/21 0503    0.9 % sodium chloride infusion  25 mL Intravenous PRN Will MD Nazia        polyethylene glycol (GLYCOLAX) packet 17 g  17 g Oral Daily PRN Will MD Nazia        acetaminophen (TYLENOL) tablet 650 mg  650 mg Oral Q6H PRN Will MD Nazia        Or    acetaminophen (TYLENOL) suppository 650 mg  650 mg Rectal Q6H PRN Will MD Nazia        potassium chloride (KLOR-CON M) extended release tablet 40 mEq  40 mEq Oral PRN Will MD Nazia        Or    potassium bicarb-citric acid (EFFER-K) effervescent tablet 40 mEq  40 mEq Oral PRN Will MD Nazia        Or    potassium chloride 10 mEq/100 mL IVPB (Peripheral Line)  10 mEq Intravenous PRN Will MD Nazia        potassium chloride 10 mEq/100 mL IVPB (Peripheral Line)  10 mEq Intravenous PRN Will MD Nazia        magnesium sulfate 2000 mg in 50 mL IVPB premix  2,000 mg Intravenous PRN Will MD Nazia        ondansetron (ZOFRAN) injection 4 mg  4 mg Intravenous Q6H PRN Will MD Nazia        aspirin chewable tablet 81 mg  81 mg Oral Daily Will MD Nazia   81 mg at 06/02/21 0745    insulin lispro (HUMALOG) injection vial 0-6 Units  0-6 Units Subcutaneous TID WC Will MD Nazia   1 Units at 06/01/21 1709    insulin lispro (HUMALOG) injection vial 0-3 Units  0-3 Units Subcutaneous Nightly Will MD Nazia   1 Units at 06/01/21 2139    glucose (GLUTOSE) 40 % oral gel 15 g  15 g Oral PRN Will MD Nazia        dextrose 50 % IV solution  12.5 g Intravenous PRN Will MD Nazia        glucagon (rDNA) injection 1 mg  1 mg Intramuscular PRN Will MD Nazia  dextrose 5 % solution  100 mL/hr Intravenous PRN Win Kirkpatrick MD        [Held by provider] enoxaparin (LOVENOX) injection 100 mg  1 mg/kg Subcutaneous Daily Sherie Duverney, MD   100 mg at 21 0758       Allergies:     Allergies   Allergen Reactions    Ibuprofen Hives, Itching and Nausea Only    Metformin Other (See Comments)    Hydrocodone Hives and Itching    Nsaids        Problem List:    Patient Active Problem List   Diagnosis Code    Acute left-sided weakness R53.1    Hypertension I10    CAD (coronary artery disease) I25.10    History of heart artery stent Z95.5    COPD (chronic obstructive pulmonary disease) ([facility]) J44.9    Dyspnea R06.00    Falls frequently R29.6    Gait instability R26.81    Hyperlipidemia associated with type 2 diabetes mellitus ([facility] E11.69, E78.5    Morbid obesity ([facility]) E66.01    CKD (chronic kidney disease) stage 3, GFR 30-59 ml/min N18.30    Narcotic habituation, continuous ([facility] F11.20    Edentulous K08.109    Right hemisphere, cerebral infarction ([facility]) I63.9    Patent foramen ovale with atrial septal aneurysm Q21.1    Nephropathy N28.9    Neuropathy G62.9    History of multiple strokes Z86.73       Past Medical History:        Diagnosis Date    Cerebral artery occlusion with cerebral infarction ([facility]     COPD (chronic obstructive pulmonary disease) ([facility])     Diabetes mellitus ([facility]     Hypertension     Neuromuscular disorder ([facility]        Past Surgical History:        Procedure Laterality Date    ABDOMEN SURGERY      gastric resection    APPENDECTOMY      CARDIAC SURGERY       SECTION      x2    CHOLECYSTECTOMY      CORONARY ANGIOPLASTY      SPLENECTOMY         Social History:    Social History     Tobacco Use    Smoking status: Former Smoker     Packs/day: 1.00     Years: 28.00     Pack years: 28.00     Types: Cigarettes     Quit date:      Years since quittin.4    Smokeless tobacco: Never Used   Substance Use Topics    Alcohol use: Not Currently                                Counseling given: No      Vital Signs (Current):   Vitals:    06/01/21 2241 06/02/21 0150 06/02/21 0522 06/02/21 0628   BP: 129/69 130/74  (!) 146/94   Pulse: 78 82  83   Resp: 18 18  16   Temp: 97 °F (36.1 °C) 97 °F (36.1 °C)  96 °F (35.6 °C)   TempSrc: Temporal Temporal  Temporal   SpO2: 96% 95%  96%   Weight:   225 lb (102.1 kg)    Height:                                                  BP Readings from Last 3 Encounters:   06/02/21 (!) 146/94   06/02/21 (!) 184/91       NPO Status:                                                                                 BMI:   Wt Readings from Last 3 Encounters:   06/02/21 225 lb (102.1 kg)     Body mass index is 37.44 kg/m².     CBC:   Lab Results   Component Value Date    WBC 14.6 06/02/2021    RBC 4.37 06/02/2021    HGB 13.6 06/02/2021    HCT 42.1 06/02/2021    HCT 40.0 08/29/2011    MCV 96.3 06/02/2021    RDW 13.9 06/02/2021     06/02/2021     08/29/2011       CMP:   Lab Results   Component Value Date     06/02/2021     08/29/2011    K 4.2 06/02/2021    K 3.9 06/02/2021    K 3.3 08/29/2011     06/02/2021     08/29/2011    CO2 22 06/02/2021    BUN 21 06/02/2021    CREATININE 1.8 06/02/2021    CREATININE 0.9 08/29/2011    GFRAA 34 06/02/2021    LABGLOM 28 06/02/2021    GLUCOSE 129 06/02/2021    PROT 7.0 06/02/2021    PROT 7.7 08/29/2011    CALCIUM 9.3 06/02/2021    BILITOT 0.3 06/02/2021    ALKPHOS 135 06/02/2021    ALKPHOS 147 08/29/2011    AST 26 06/02/2021    ALT 10 06/02/2021       POC Tests:   Recent Labs     06/02/21  0745   POCGLU 152*       Coags:   Lab Results   Component Value Date    PROTIME 13.6 06/02/2021    INR 1.05 06/02/2021       HCG (If Applicable): No results found for: PREGTESTUR, PREGSERUM, HCG, HCGQUANT     ABGs: No results found for: PHART, PO2ART, DBL1LLA, JDO6OGP, BEART, M8WAKCPW     Type & Screen (If Applicable):  No results found for: LABABO, LABRH    Drug/Infectious Status (If Applicable):  No results found for: HIV, HEPCAB    COVID-19 Screening (If Applicable): No results found for: COVID19        Anesthesia Evaluation  Patient summary reviewed and Nursing notes reviewed no history of anesthetic complications:   Airway: Mallampati: II  TM distance: >3 FB   Neck ROM: full  Mouth opening: > = 3 FB Dental: normal exam         Pulmonary:Negative Pulmonary ROS and normal exam  breath sounds clear to auscultation  (+) COPD:  shortness of breath:      (-) not a current smoker          Patient did not smoke on day of surgery. Cardiovascular:    (+) hypertension:, CAD:,     (-)  angina and  CHF    NYHA Classification: I  ECG reviewed  Rhythm: regular  Rate: normal           Beta Blocker:  Not on Beta Blocker      ROS comment: Normal size left atrium. positive bubble study   Normal right atrial dimension with no evidence of thrombus or mass noted. Normal right ventricular size with preserved RV function. Normal right ventricular size with preserved RV function. pfo present   Normal right ventricular size with preserved RV function. pfo present. large shunt. aneurysmal ias. No evidence of significant pericardial effusion is noted. No evidence of pleural effusion. Structurally normal mitral valve with normal leaflet mobility. No evidence   of mitral valve stenosis or mild mitral regurgitation. Aortic valve appears to be tricuspid. Structurally normal aortic valve. No significant aortic regurgitation or stenosis is noted. Tricuspid valve is structurally normal.   No evidence of tricuspid regurgitation.       Signature     Neuro/Psych:   Negative Neuro/Psych ROS  (+) CVA:, neuromuscular disease:,    (-) seizures and depression/anxiety            GI/Hepatic/Renal: Neg GI/Hepatic/Renal ROS  (+) renal disease: CRI,      (-) hiatal hernia and GERD       Endo/Other: Negative Endo/Other ROS   (+) DiabetesType II DM, , . Pt had PAT visit. Abdominal:       Abdomen: soft. Vascular:                                        Anesthesia Plan      general     ASA 3     (Iv zofran within 30 min of closing )  Induction: intravenous. RAMON  MIPS: Postoperative opioids intended and Prophylactic antiemetics administered. Anesthetic plan and risks discussed with patient. Use of blood products discussed with patient whom. Plan discussed with CRNA.     Attending anesthesiologist reviewed and agrees with Pre Eval content              Magen Babin MD   6/2/2021

## 2021-06-02 NOTE — CARE COORDINATION
Spoke with patient at bedside and she is interested in going to [facility] when medically stable after procedure Electronically signed by Oj Almazan RN on 6/2/2021 at 9:08 AM

## 2021-06-02 NOTE — CARE COORDINATION
The [address] at [facility]  Notification of Admission Decision      [] Patient has been accepted for admit to [facility] on :       Please write discharge orders and summary prior to discharge. [x] Patient acceptance to Rehab pending the following : insurance approval and medical stability. [] Eval in progress       [] Patient determined to be ineligible for services at [facility] because : We recommend you consider        Thank you for your referral, we appreciate you. If you have any questions, please feel   free to contact me at [phone].     Electronically Signed by Hannah Rodas, Admissions Coordinator 6/2/2021 10:38 AM

## 2021-06-02 NOTE — PROGRESS NOTES
Occupational Therapy  Facility/Department: [facility] PROGRESSIVE CARE  Daily Treatment Note  NAME: Joanie Lau  : 1955  MRN: [id number]    Date of Service: 2021    Discharge Recommendations:  Patient would benefit from continued therapy after discharge       Assessment   Assessment: Pt tolerated tx well. Pt able to walk to doorway and then to chair. The patient would benefit from further therapy to upgrade skills. Will continue to follow. History: multiple bilateral strokes  REQUIRES OT FOLLOW UP: Yes  Activity Tolerance  Activity Tolerance: Patient Tolerated treatment well         Patient Diagnosis(es): There were no encounter diagnoses. has a past medical history of Cerebral artery occlusion with cerebral infarction ([facility] COPD (chronic obstructive pulmonary disease) ([facility] Diabetes mellitus ([facility] Hypertension, and Neuromuscular disorder ([facility] has a past surgical history that includes Cholecystectomy; Appendectomy; Cardiac surgery; Splenectomy; Coronary angioplasty;  section; and Abdomen surgery.     Restrictions  Restrictions/Precautions  Restrictions/Precautions: Fall Risk  Required Braces or Orthoses?: No  Subjective   General  Chart Reviewed: Yes  Patient assessed for rehabilitation services?: Yes  Response to previous treatment: Patient with no complaints from previous session  Family / Caregiver Present: No  Vital Signs  Patient Currently in Pain: No   Orientation     Objective             Balance  Sitting Balance: Independent  Standing Balance: Contact guard assistance  Bed mobility  Supine to Sit: Independent  Transfers  Stand Step Transfers: Contact guard assistance  Sit to stand: Contact guard assistance  Stand to sit: Contact guard assistance                                                           Plan   Plan  Times per week: 3-5  Goals  Short term goals  Short term goal 1: Modified independent with dressing  Short term goal 2: Modified independent with toileting  Short term goal 3: Modified independent with transfers  Short term goal 4: Supervision for light ambulatory ADL  Short term goal 5: Independent with therapeutic recommendations, verbalize DME/AE options.        Therapy Time   Individual Concurrent Group Co-treatment   Time In           Time Out           Minutes              GHAZAL Kirby  Electronically signed by GHAZAL Kirby on 6/2/2021 at 9:52 AM

## 2021-06-02 NOTE — ANESTHESIA PROCEDURE NOTES
Procedure Performed: RAMON       Start Time:  6/2/2021 12:14 PM       End Time:   6/2/2021 12:14 PM    Preanesthesia Checklist:  Patient identified, IV assessed, risks and benefits discussed, monitors and equipment assessed, procedure being performed at surgeon's request and anesthesia consent obtained. General Procedure Information  Diagnostic Indications for Echo:  assessment of ascending aorta, hemodynamic monitoring and assessment of valve function  Physician Requesting Echo: Clementina Tan MD  Location performed:  OR  Intubated  Bite block placed  Heart visualized  Probe Insertion:  Easy  Probe Type:  3D  Modalities:  2D only, 3D, color flow mapping, continuous wave Doppler and pulse wave Doppler    Echocardiographic and Doppler Measurements    Ventricles    Right Ventricle:  Cavity size normal.  Hypertrophy not present. Thrombus not present. Global function normal.    Left Ventricle:  Cavity size normal.  Hypertrophy not present. Thrombus not present. Global Function normal.      Ventricular Regional Function:  1- Basal Anteroseptal:  normal  2- Basal Anterior:  normal  3- Basal Anterolateral:  normal  4- Basal Inferolateral:  normal  5- Basal Inferior:  normal  6- Basal Inferoseptal:  normal  7- Mid Anteroseptal:  normal  8- Mid Anterior:  normal  9- Mid Anterolateral:  normal  10- Mid Inferolateral:  normal  11- Mid Inferior:  normal  12- Mid Inferoseptal:  normal  13- Apical Anterior:  normal  14- Apical Lateral:  normal  15- Apical Inferior:  normal  16- Apical Septal:  normal  17- [place]:  normal      Valves    Aortic Valve: Annulus normal.  Stenosis not present. Regurgitation none. Leaflets normal.  Leaflet motions normal.      Mitral Valve: Annulus normal.  Stenosis not present. Regurgitation none. Leaflets normal.  Leaflet motions normal.      Tricuspid Valve: Annulus normal.  Stenosis not present. Regurgitation none. Leaflets normal.  Leaflet motions normal.    Pulmonic Valve:   Annulus normal.  Stenosis not present. Regurgitation none. Aorta    Ascending Aorta:  Size normal.  Dissection not present. Aortic Arch:  Size normal.  Dissection not present. Descending Aorta:  Size normal.  Dissection not present. Atria    Right Atrium:  Size normal.  Spontaneous echo contrast not present. Thrombus not present. Tumor not present. Device not present. Left Atrium:  Size normal.  Spontaneous echo contrast not present. Thrombus not present. Tumor not present. Device not present. Left atrial appendage thrombus. Other Atria Findings:       Questionable filamentous structure on inside tip of coumadin ridge, discussed with Cardiologist.     Septa    Atrial Septum:  Intra-atrial septal morphology normal, aneurysmal and lipomatous hypertrophy. Other atrial septal defect findings:       Very thick septum with aneurysmal component. Bubble study positive but unable to localilze pfo by cfd    Ventricular Septum:  Intra-ventricular septum morphology normal.          Other Findings  Pericardium:  normal  Pleural Effusion:  none  Pulmonary Arteries:  normal  Pulmonary Venous Flow:  normal    Anesthesia Information  Performed Personally  Anesthesiologist:  Jerris Apgar, MD      Echocardiogram Comments:       PFO occlusion Amplatzer device guided by RAMON with success.  Device well seated and no pericardial effusion postop

## 2021-06-03 PROBLEM — Z51.5 PALLIATIVE CARE PATIENT: Status: ACTIVE | Noted: 2021-01-01

## 2021-06-03 NOTE — PROGRESS NOTES
Critical Care Progress Note      Events of Last 24 hours: pt became more lethargic, had repeat CT head done that did not show new stroke but neurologist thinks she could have another one. MRI not able to be done due to recent hardware put in to fix PFO. Pt at this point is lethargic and answers very basic questions. CC: slurred speech, left weakness  S: in pain   O: moderate distress, currently receiving enema, still lethargic     Recent Labs     06/03/21  1507   PHART 7.360   RGW4NQB 41.0   PO2ART 141.0*       ABGs:   Lab Results   Component Value Date    PHART 7.360 06/03/2021    PO2ART 141.0 06/03/2021    INO8SQB 41.0 06/03/2021       IV:   sodium chloride 100 mL/hr at 06/03/21 1714    sodium chloride      sodium chloride      dextrose         Review of Systems:   · Constitutional: There's been change in energy level, and activity level. · Cardiovascular: No chest pain, dyspnea on exertion, palpitations or loss of consciousness. No cough, hemoptysis  · Respiratory: No cough or wheezing, no sputum production. No hematemesis. · Gastrointestinal: No abdominal pain, appetite loss, blood in stools. Constipation  · Genitourinary: retention, emanuel in place   · Musculoskeletal:  Left sided weakness  · Integumentary: No rash or pruritis. · Psychiatric: No anxiety, or depression. · Endocrine: No temperature intolerance. No excessive thirst, fluid intake, or urination. No tremor. · Hematologic/Lymphatic: No abnormal bruising or bleeding, blood clots or swollen lymph nodes. · Allergic/Immunologic: No nasal congestion or hives. Vitals:  BP (!) 125/57   Pulse 86   Temp 96.3 °F (35.7 °C) (Temporal)   Resp 9   Ht 5' 5\" (1.651 m)   Wt 221 lb 3.2 oz (100.3 kg)   SpO2 91%   BMI 36.81 kg/m²        Intake/Output Summary (Last 24 hours) at 6/3/2021 1721  Last data filed at 6/3/2021 1400  Gross per 24 hour   Intake 1177.92 ml   Output 1800 ml   Net -622.08 ml       EXAM:  General: moderate distress. Lethargic   Eyes: No sclera icterus. No conjunctival injection. ENT: No discharge. Pharynx clear. Neck: Trachea midline. Normal thyroid. Resp: No accessory muscle use. No crackles. No wheezing. No rhonchi. No dullness on percussion. CV: Regular rate. Regular rhythm. No mumur or rub. No edema. GI: Non-tender. Non-distended. No masses. No organmegaly. Normal bowel sounds. No hernia. Skin: Warm and dry. No nodule on exposed extremities. No rash on exposed extremities. Lymph: No cervical LAD. No supraclavicular LAD. M/S: No cyanosis. No joint deformity. No clubbing. Neuro: lethargic, does not follow commends  Psych: lethargic, not oriented     Medications:  Scheduled Meds:   sodium chloride flush  5-40 mL Intravenous 2 times per day    sodium chloride flush  5-40 mL Intravenous 2 times per day    clopidogrel  75 mg Oral Daily    bisacodyl  10 mg Rectal Daily    magnesium hydroxide  30 mL Oral Daily    aspirin  81 mg Oral Daily    insulin lispro  0-6 Units Subcutaneous TID WC    insulin lispro  0-3 Units Subcutaneous Nightly         Results:  CBC:   Recent Labs     06/03/21  0011 06/03/21  0627 06/03/21  1159   WBC 17.0* 21.9* 24.5*   HGB 13.6 14.6 12.8   HCT 41.5 44.4 38.0   MCV 96.1 97.4 94.8    210 345     BMP:   Recent Labs     06/02/21  0111 06/02/21  0112 06/03/21  1159 06/03/21  1507    141 140  --    K 3.9 4.2 4.4 4.1    103 105  --    CO2 22 22 24  --    BUN 21 21 20  --    CREATININE 1.9* 1.8* 1.3*  --      LIVER PROFILE:   Recent Labs     06/02/21  0112   AST 26   ALT 10   BILITOT 0.3   ALKPHOS 135*     PT/INR:   Recent Labs     06/02/21  0112   PROTIME 13.6   INR 1.05     APTT: No results for input(s): APTT in the last 72 hours.   UA:  Recent Labs     06/03/21  1213   COLORU YELLOW   PHUR 5.5   WBCUA 1   RBCUA 12*   BACTERIA NEGATIVE*   CLARITYU Clear   SPECGRAV 1.019   LEUKOCYTESUR Negative   UROBILINOGEN 0.2   BILIRUBINUR Negative   BLOODU SMALL*   GLUCOSEU 250* Cultures:      Films:  CT head today \"No new acute intracranial abnormality. No change since   previous MRI dated 5/29/2021. The foci of decreased attenuation   representing acute/subacute infarction in the right periventricular   white matter and the right basal ganglia are identified. A focus of   encephalomalacia/chronic infarction in the left posterior frontal   subcortical white matter was also noted in the previous MR imaging of   the brain. No change\"      Assessment/plan   · New right hemisphere stroke- tx per neurology   · PFO- sp repair 6/2  per cardiology  · HTN- hydralazine prn  · DM2- ISS  · Constipation - treat with enema  · Urinary retention- emanuel in place  · Hematoma at the incision site- resolving  · ROB - resolving with IVF and emanuel catheter   · Leukocytosis- monitor- UA neg, CXR neg, no fever     Patient Active Problem List:     CAD (coronary artery disease)     History of heart artery stent     COPD (chronic obstructive pulmonary disease) ([address]     Falls frequently     Gait instability     Hyperlipidemia      Narcotic habituation, continuous ([address]     Neuropathy     History of multiple strokes    Pt will need SNF, PT/OT ordered    CORBIN Hurtado MD

## 2021-06-03 NOTE — PROGRESS NOTES
Occupational Therapy  Facility/Department: [facility] ICU  Daily Treatment Note  NAME: Nacho Rodríguez  : 1955  MRN: [id number]    Date of Service: 6/3/2021    Discharge Recommendations:  Patient would benefit from continued therapy after discharge       Assessment   Performance deficits / Impairments: Decreased functional mobility ; Decreased ADL status; Decreased ROM; Decreased endurance;Decreased cognition  Assessment: Pt in ICU and talked with pt's nurse before going in to see pt and nursing approved for therapy to see what pt could do. Pt was asleep and when name called and arm touched, pt opened eyes briefly. Pt responded to a few questions with yes and no or shaking head. Provided PROM to tari UE's and positioned arms and legs with pillows. Will continue to follow. Treatment Diagnosis: Cerebral infarction, Patent Foramen ovale and atrial septal aneurysm  History: multiple bilateral strokes  REQUIRES OT FOLLOW UP: Yes  Activity Tolerance  Activity Tolerance: Treatment limited secondary to medical complications (free text)         Patient Diagnosis(es): There were no encounter diagnoses. has a past medical history of Cerebral artery occlusion with cerebral infarction [facility]), COPD (chronic obstructive pulmonary disease) ([facility] Diabetes mellitus ([facility] [address] Hypertension, Neuromuscular disorder ([facility] [address] and Palliative care patient. has a past surgical history that includes Cholecystectomy; Appendectomy; Cardiac surgery; Splenectomy; Coronary angioplasty;  section; and Abdomen surgery.     Restrictions  Restrictions/Precautions  Restrictions/Precautions: Fall Risk  Required Braces or Orthoses?: No  Subjective   General  Chart Reviewed: Yes  Patient assessed for rehabilitation services?: Yes  Response to previous treatment: Patient with no complaints from previous session  Family / Caregiver Present: No  Pain Assessment  Pain Assessment: Faces  Christensen-Baker Pain Rating: Hurts little more  Pain Type: Acute pain  Pain Location: Leg  Pain Orientation: Right;Left  Pain Descriptors: Aching  Pain Frequency: Intermittent  Pain Onset: Unable to tell  Functional Pain Assessment: Prevents or interferes with all active and some passive activities  Non-Pharmaceutical Pain Intervention(s): Ambulation/Increased Activity;Repositioned; Therapeutic presence; Therapeutic touch  Response to Pain Intervention: Drowsy  Vital Signs  Patient Currently in Pain: Yes   Orientation     Objective    ADL  Grooming: Dependent/Total                                                           Type of ROM/Therapeutic Exercise  Type of ROM/Therapeutic Exercise: PROM  Comment: PROM tari UE's 10x1. Plan   Plan  Times per week: 3-5  Goals  Short term goals  Short term goal 1: Modified independent with dressing  Short term goal 2: Modified independent with toileting  Short term goal 3: Modified independent with transfers  Short term goal 4: Supervision for light ambulatory ADL  Short term goal 5: Independent with therapeutic recommendations, verbalize DME/AE options.        Therapy Time   Individual Concurrent Group Co-treatment   Time In           Time Out           Minutes              GHAZAL Izaguirre  Electronically signed by GHAZAL Izaguirre on 6/3/2021 at 4:14 PM

## 2021-06-03 NOTE — PROGRESS NOTES
Dr. Rachelle Anderson notified of patient mental status change and events that occurred overnight. Notified result of head CT. Patient still not following commands or able to answer questions. EEG ordered.

## 2021-06-03 NOTE — PROGRESS NOTES
Multiple phlebotomists have tried to obtain labs from patient. Dr. Laurie Sen ordered for Respiratory to be able to draw arterial at 0830. Respiratory unsuccessful. Orders received by Dr. Laurie Sen to consult Dr. Aguilera Argue to place central line.

## 2021-06-03 NOTE — PROGRESS NOTES
Notified Dr. Irwin Alt of patient /89, other vitals stable. Patient is obtunded and not able to follow commands.  Orders received for STAT head CT wo contrast.

## 2021-06-03 NOTE — CARE COORDINATION
To 8th pending medical clearance and insurance approval.  Electronically signed by NATALIE Goetz on 6/3/2021 at 10:46 AM

## 2021-06-03 NOTE — PROGRESS NOTES
[facility] MEDICINE  - PROGRESS NOTE    Admit Date: 5/29/2021         CC: slurred speech, left hemiplegia    Subjective: obtunded     Objective: obtunded, not responding, still under anaesthesia effect    Diet: Diet NPO Exceptions are: Sips with Meds  Pain is:None  Nausea:None  Bowel Movement/Flatus no    Data:   Scheduled Meds: Reviewed  Current Facility-Administered Medications   Medication Dose Route Frequency Provider Last Rate Last Admin    0.9 % sodium chloride infusion   Intravenous Continuous Mary Scruggs  mL/hr at 06/02/21 1205 Rate Change at 06/02/21 1205    [START ON 6/3/2021] perflutren lipid microspheres (DEFINITY) injection 1.65 mg  1.5 mL Intravenous ONCE PRN ALEXYS Gutierrez - CNP        sodium chloride flush 0.9 % injection 5-40 mL  5-40 mL Intravenous 2 times per day ALEXYS Otto - CNP        sodium chloride flush 0.9 % injection 5-40 mL  5-40 mL Intravenous PRN ALEXYS Gutierrez - CNP        0.9 % sodium chloride infusion  25 mL Intravenous PRN ALEXYS Gutierrez - CNP        acetaminophen (TYLENOL) tablet 650 mg  650 mg Oral Q4H PRN Jake Javier APRN - CNP        sodium chloride flush 0.9 % injection 5-40 mL  5-40 mL Intravenous 2 times per day Roosevelt Randolph MD        sodium chloride flush 0.9 % injection 5-40 mL  5-40 mL Intravenous PRN Roosevelt Randolph MD        0.9 % sodium chloride infusion  25 mL Intravenous PRN Roosevelt Randolph MD        scopolamine (TRANSDERM-SCOP) transdermal patch 1 patch  1 patch Transdermal Once Roosevelt Randolph MD        clopidogrel (PLAVIX) tablet 75 mg  75 mg Oral Daily Jake Licona APRN - CNP        meperidine (DEMEROL) injection 12.5 mg  12.5 mg Intravenous Q4H PRN Mary Scruggs MD        bisacodyl (DULCOLAX) suppository 10 mg  10 mg Rectal Daily Mary Scruggs MD        [START ON 6/3/2021] magnesium hydroxide (MILK OF MAGNESIA) 400 MG/5ML suspension 30 mL  30 mL Oral Daily Rolando Walsh MD        methylnaltrexone (RELISTOR) injection 6 mg  6 mg Subcutaneous Once Rolando Walsh MD        pregabalin (LYRICA) capsule 150 mg  150 mg Oral BID Selene Bryant MD   150 mg at 06/02/21 0745    polyethylene glycol (GLYCOLAX) packet 17 g  17 g Oral Daily PRN Selene Bryant MD        acetaminophen (TYLENOL) suppository 650 mg  650 mg Rectal Q6H PRN Selene Bryant MD        potassium chloride (KLOR-CON M) extended release tablet 40 mEq  40 mEq Oral PRN Selene Bryant MD        Or    potassium bicarb-citric acid (EFFER-K) effervescent tablet 40 mEq  40 mEq Oral PRN Selene Bryant MD        Or    potassium chloride 10 mEq/100 mL IVPB (Peripheral Line)  10 mEq Intravenous PRN Selene Bryant MD        potassium chloride 10 mEq/100 mL IVPB (Peripheral Line)  10 mEq Intravenous PRN Selene Bryant MD        ondansetron (ZOFRAN) injection 4 mg  4 mg Intravenous Q6H PRN Selene Bryant MD        aspirin chewable tablet 81 mg  81 mg Oral Daily Selene Bryant MD   81 mg at 06/02/21 0745    insulin lispro (HUMALOG) injection vial 0-6 Units  0-6 Units Subcutaneous TID WC Selene Bryant MD   1 Units at 06/01/21 1709    insulin lispro (HUMALOG) injection vial 0-3 Units  0-3 Units Subcutaneous Nightly Selene Bryant MD   1 Units at 06/01/21 2139    glucose (GLUTOSE) 40 % oral gel 15 g  15 g Oral PRN Selene Bryant MD        dextrose 50 % IV solution  12.5 g Intravenous PRN Selene Bryant MD        glucagon (rDNA) injection 1 mg  1 mg Intramuscular PRN Selene Bryant MD        dextrose 5 % solution  100 mL/hr Intravenous PRN Selene Bryant MD         DVT Prophylaxis: none, pt has hematoma    Continuous Infusions:   sodium chloride 125 mL/hr at 06/02/21 1205    sodium chloride      sodium chloride      dextrose         Intake/Output Summary (Last 24 hours) at 6/2/2021 2008  Last data filed at 6/2/2021 1819  Gross per 24 hour   Intake 600 ml   Output 603 ml   Net -3 ml     CBC:   Recent Labs     06/01/21  0328 06/02/21  0111 06/02/21  1756   WBC 12.5* 14.6* 12.9*   HGB 14.6 13.6 14.3    374 384     BMP:  Recent Labs     06/01/21  0654 06/02/21  0111 06/02/21  0112    138 141   K 4.4 3.9 4.2    102 103   CO2 25 22 22   BUN 17 21 21   CREATININE 1.4* 1.9* 1.8*   GLUCOSE 124* 126* 129*     ABGs: No results found for: PHART, PO2ART, RYG1NAY  INR:   Recent Labs     06/02/21  0112   INR 1.05         Objective:   Vitals: BP (!) 140/86   Pulse 92   Temp 97.1 °F (36.2 °C) (Temporal)   Resp 16   Ht 5' 5\" (1.651 m)   Wt 225 lb (102.1 kg)   SpO2 97%   BMI 37.44 kg/m²   General appearance: obtunded  Skin: Skin color, texture, turgor normal.   HEENT: Head: Normocephalic, no lesions, without obvious abnormality.   Neck: no adenopathy, no carotid bruit, no JVD and supple, symmetrical, trachea midline  Lungs: clear to auscultation bilaterally  Heart: regular rate and rhythm, S1, S2 normal, no murmur, click, rub or gallop  Abdomen: soft, non-tender; bowel sounds normal; no masses,  no organomegaly  Extremities: extremities normal, atraumatic, no cyanosis or edema  Lymphatic: No significant lymph node enlargement papable  Neurologic: Mental status: obtunded       Assessment & Plan:    · New right hemisphere stroke- per neurology   · PFO- sp repair today per cardiology  · HTN  · DM2  · Constipation without obstruction per gen surgery- treat   · Urinary retention- emanuel for now    Patient Active Problem List:     CAD (coronary artery disease)     History of heart artery stent     COPD (chronic obstructive pulmonary disease) ([facility] [address]     Falls frequently     Gait instability     Hyperlipidemia associated with type 2 diabetes mellitus (HCC)     CKD (chronic kidney disease) stage 3, GFR 30-59 ml/min     Narcotic habituation, continuous (HCC)     Neuropathy     History of multiple strokes      See orders   Disposition: TBD home vs rehab    Dakota Miles MD

## 2021-06-03 NOTE — CONSULTS
Palliative Care:    Initiated for support and goals of care. 63 y.o. Jaquelin Florence a history of previous strokes (3) who was transferred from [address] [place] [address] having yet another stroke. Cardiology following and yesterday pt had closure of PFO. Visit to the bedside this morning and her nurse Damian Herrera is present. Pt has just returned from repeat CT of head. She has been poorly responsive prior and not following commands. She has begun to wake up and opened her eyes and able to take medication for nurse. She is delayed, but answers questions and gave the nurse a slight squeeze of hand. She says yes to pain but is unable to specify where. Nursing will continue to assess. No family here at this time. Noted that pt lives with her son. Pt gives permission for us to reach out to her son, Melany Dubois. Past Medical History:        Past Medical History:   Diagnosis Date    Cerebral artery occlusion with cerebral infarction ([facility]     COPD (chronic obstructive pulmonary disease) ([facility] [address] [facility]     Diabetes mellitus ([facility] [address] [facility]     Hypertension     Neuromuscular disorder ([facility] [address] [facility]     Palliative care patient 06/03/2021       Advance Directives:    Full code     Pain/Other Symptoms:    Pt says \"yes\" to pain, but cannot give specifics or rate. Plan:    Continue medical management and monitoring. Patient/family discussion r/t goals: Will reach out to pt's son and review goals of care. Pt has a rehab referral and pending insurance approval, when she is stable to transfer.     P/C will continue to support pt/family and follow POC         Electronically signed by Leonid Rivera RN on 6/3/2021 at 9:59 AM

## 2021-06-03 NOTE — PROCEDURES
After informed consent was obtained, the left femoral region was prepped and draped in sterile fashion. 5 mL of 1% lidocaine was used for anesthesia. The Site Right ultrasound was used real-time to identify and cannulate the femoral vein on 1 attempt. The guidewire was inserted without difficulty. A skin nick was made. The tract was then dilated. The catheter was then inserted via Seldinger technique. All 3 ports were flushed and had good flow. The catheter was sutured in place there were no immediate complications. Estimated blood loss was 3 cc.

## 2021-06-03 NOTE — PROGRESS NOTES
Value Ref Range & Units Status Collected Lab    pH, Arterial 7.360  7.350 - 7.450 Final 06/03/2021  3:07 PM [address] [place] [address] Lab   pCO2, Arterial 41.0  35.0 - 45.0 mmHg Final 06/03/2021  3:07 PM [facility] Lab   pO2, Arterial 141. 0High   80.0 - 100.0 mmHg Final 06/03/2021  3:07 PM [facility] Lab   HCO3, Arterial 23.2  22.0 - 26.0 mmol/L Final 06/03/2021  3:07 PM [facility] Excess, Arterial -2.2Low   -2.0 - 2.0 mmol/L Final 06/03/2021  3:07 PM [address] [place] [address] Lab   Hemoglobin, Art, Extended 13.3  12.0 - 16.0 g/dL Final 06/03/2021  3:07 PM [address] [place] [address] Lab   O2 Sat, Arterial 96.4  >92 % Final 06/03/2021  3:07 PM [facility] Lab   Carboxyhgb, Arterial 1.8  0.0 - 5.0 % Final 06/03/2021  3:07 PM  - [address]   0.0-1.5   (Smokers 1.5-5.0)    Methemoglobin, Arterial 1.7  <1.5 % Final 06/03/2021  3:07 PM [address] [place] [address] Lab   O2 Content, Arterial 18.3  Not Established mL/dL Final 06/03/2021  3:07 PM [address] [place] [address] Lab   O2 Therapy Unknown   Final 06/03/2021  3:07 PM [address] [place] [address] Lab   Testing Performed By    Mini Fraser Name Director Address Valid Date Range   [facility] - [address] LAB Aquiles Carmen  [place],[address]   [address] [facility] [place] [address] 08/30/17 0733-Present   Lab and Collection    Blood Gas, Arterial - 6/3/2021  Result History    Blood Gas, Arterial on 6/3/2021   Result Information    Flag: AbnormalAbnormal   Status: Final result (Collected: 6/3/2021 15:07) Provider Status: Ordered   Routing History    Priority Sent On From To Message Type    6/3/2021  6:36 AM Cristo Martínez Incoming Lab Results From [address] [facility], APRN - LEONOR     6/3/2021  6:36 AM Cristo Martínez Incoming Lab Results From [address] MD     5/29/2021 10:00 AM Cristo Martínez Incoming Lab Results From Zonia Shaw MD     5/29/2021  7:32 AM Cristo Martínez Incoming Lab Results From Herb Martin MD     5/29/2021  5:37 AM Cristo Martínez Incoming Lab Results From [address] [place] [address], MD    Click to Print Result   View SmartLink Info    Blood Gas, Arterial (Order #[id number]) on 6/3/21   Order Report    Order Details  2 lpm rr

## 2021-06-03 NOTE — PROGRESS NOTES
[address], [address]  [place], [place] [address]  Phone [phone]     Neurology Progress Note  6/3/2021 3:16 PM  Information:   Patient Name: Neela Bridges  :   1955  Age:   63 y.o. MRN:   [id number]  Account #:  [de-identified]  Admit Date:   2021  Today:  6/3/21     ADMIT DX:   Right hemisphere, cerebral infarction [facility])    Subjective:     Corinne Beat a 63y.o. year old woman with a history of hypertension, hyperlipidemia, diabetes, cardiovascular disease, and previous strokes who was transferred from [address] [place] [address] having yet another stroke.      Interval History:   She had PFO closure yesterday. She developed abdominal pain, urine retention, and right groin hematoma after but was able to move and talk. At 8pm, she was noted to have diminished responsiveness but did have IV opioid. She was transferred to the ICU. She remained minimally responsive this am and CT head was performed and showed the recent right hemisphere stroke but nothing new. She remains obtunded, moaning, not following commands. Objective:     Past Medical History:  Past Medical History:   Diagnosis Date    [facility]erebral artery occlusion with cerebral infarction ([facility]     COPD (chronic obstructive pulmonary disease) (HCC)     Diabetes mellitus ([facility]     Hypertension     Neuromuscular disorder ([facility]     Palliative care patient 2021       Past Surgical History:   Procedure Laterality Date    ABDOMEN SURGERY      gastric resection    APPENDECTOMY      CARDIAC SURGERY       SECTION      x2    CHOLECYSTECTOMY      CORONARY ANGIOPLASTY      SPLENECTOMY         No family history on file. Social History     Socioeconomic History    Marital status:       Spouse name: Not on file    Number of children: Not on file    Years of education: Not on file    Highest education level: Not on file   Occupational History    Not on file   Tobacco Use    Smoking status: Former Smoker Packs/day: 1.00     Years: 28.00     Pack years: 28.00     Types: Cigarettes     Quit date: 2019     Years since quittin.4    Smokeless tobacco: Never Used   Vaping Use    Vaping Use: Never used   Substance and Sexual Activity    Alcohol use: Not Currently    Drug use: Never    Sexual activity: Not Currently   Other Topics Concern    Not on file   Social History Narrative    Not on file     Social Determinants of Health     Financial Resource Strain:     Difficulty of Paying Living Expenses:    Food Insecurity:     Worried About Running Out of Food in the Last Year:     [address] in the Last Year:    Transportation Needs:     Lack of Transportation (Medical):      Lack of Transportation (Non-Medical):    Physical Activity:     Days of Exercise per Week:     Minutes of Exercise per Session:    Stress:     Feeling of Stress :    Social Connections:     Frequency of Communication with Friends and Family:     Frequency of Social Gatherings with Friends and Family:     Attends [employer] orth[employer] Services:     Active Member of Clubs or Organizations:     Attends Club or Organization Meetings:     Marital Status:    Intimate Partner Violence:     Fear of Current or Ex-Partner:     Emotionally Abused:     Physically Abused:     Sexually Abused:        Medications:   sodium chloride 50 mL/hr at 21 1400    sodium chloride      sodium chloride      dextrose        sodium chloride flush  5-40 mL Intravenous 2 times per day    sodium chloride flush  5-40 mL Intravenous 2 times per day    clopidogrel  75 mg Oral Daily    bisacodyl  10 mg Rectal Daily    magnesium hydroxide  30 mL Oral Daily    aspirin  81 mg Oral Daily    insulin lispro  0-6 Units Subcutaneous TID WC    insulin lispro  0-3 Units Subcutaneous Nightly       Diagnostic Studies:  Reviewed all labs/diagnositcs since last 24hrs:  Recent Results (from the past 24 hour(s))   POCT Glucose    Collection Time: 21  5:02 PM Result Value Ref Range    POC Glucose 216 (H) 70 - 99 mg/dl    Performed on AccuChek    CBC    Collection Time: 06/02/21  5:56 PM   Result Value Ref Range    WBC 12.9 (H) 4.8 - 10.8 K/uL    RBC 4.56 4.20 - 5.40 M/uL    Hemoglobin 14.3 12.0 - 16.0 g/dL    Hematocrit 43.9 37.0 - 47.0 %    MCV 96.3 81.0 - 99.0 fL    MCH 31.4 (H) 27.0 - 31.0 pg    MCHC 32.6 (L) 33.0 - 37.0 g/dL    RDW 13.7 11.5 - 14.5 %    Platelets 707 434 - 544 K/uL    MPV 10.6 9.4 - 12.3 fL   POCT Glucose    Collection Time: 06/02/21  8:22 PM   Result Value Ref Range    POC Glucose 219 (H) 70 - 99 mg/dl    Performed on AccuChek    CBC    Collection Time: 06/03/21 12:11 AM   Result Value Ref Range    WBC 17.0 (H) 4.8 - 10.8 K/uL    RBC 4.32 4.20 - 5.40 M/uL    Hemoglobin 13.6 12.0 - 16.0 g/dL    Hematocrit 41.5 37.0 - 47.0 %    MCV 96.1 81.0 - 99.0 fL    MCH 31.5 (H) 27.0 - 31.0 pg    MCHC 32.8 (L) 33.0 - 37.0 g/dL    RDW 13.4 11.5 - 14.5 %    Platelets 776 473 - 030 K/uL    MPV 10.8 9.4 - 12.3 fL   CBC    Collection Time: 06/03/21  6:27 AM   Result Value Ref Range    WBC 21.9 (H) 4.8 - 10.8 K/uL    RBC 4.56 4.20 - 5.40 M/uL    Hemoglobin 14.6 12.0 - 16.0 g/dL    Hematocrit 44.4 37.0 - 47.0 %    MCV 97.4 81.0 - 99.0 fL    MCH 32.0 (H) 27.0 - 31.0 pg    MCHC 32.9 (L) 33.0 - 37.0 g/dL    RDW 13.5 11.5 - 14.5 %    Platelets 032 195 - 624 K/uL    MPV 11.0 9.4 - 12.3 fL   POCT Glucose    Collection Time: 06/03/21  7:25 AM   Result Value Ref Range    POC Glucose 195 (H) 70 - 99 mg/dl    Performed on AccuChek    Basic Metabolic Panel w/ Reflex to MG    Collection Time: 06/03/21 11:59 AM   Result Value Ref Range    Sodium 140 136 - 145 mmol/L    Potassium reflex Magnesium 4.4 3.5 - 5.0 mmol/L    Chloride 105 98 - 111 mmol/L    CO2 24 22 - 29 mmol/L    Anion Gap 11 7 - 19 mmol/L    Glucose 182 (H) 74 - 109 mg/dL    BUN 20 8 - 23 mg/dL    CREATININE 1.3 (H) 0.5 - 0.9 mg/dL    GFR Non- 41 (A) >60    GFR  50 (L) >59    Calcium 9.2 8.8 - 10.2 mg/dL   PROBNP, N-TERMINAL    Collection Time: 06/03/21 11:59 AM   Result Value Ref Range    Pro- 0 - 900 pg/mL   TSH without Reflex    Collection Time: 06/03/21 11:59 AM   Result Value Ref Range    TSH 0.732 0.270 - 4.200 uIU/mL   Ammonia    Collection Time: 06/03/21 11:59 AM   Result Value Ref Range    Ammonia 33 11 - 51 umol/L   CBC    Collection Time: 06/03/21 11:59 AM   Result Value Ref Range    WBC 24.5 (H) 4.8 - 10.8 K/uL    RBC 4.01 (L) 4.20 - 5.40 M/uL    Hemoglobin 12.8 12.0 - 16.0 g/dL    Hematocrit 38.0 37.0 - 47.0 %    MCV 94.8 81.0 - 99.0 fL    MCH 31.9 (H) 27.0 - 31.0 pg    MCHC 33.7 33.0 - 37.0 g/dL    RDW 13.3 11.5 - 14.5 %    Platelets 089 371 - 913 K/uL    MPV 10.6 9.4 - 12.3 fL   Urinalysis Reflex to Culture    Collection Time: 06/03/21 12:13 PM    Specimen: Urine, clean catch   Result Value Ref Range    Color, UA YELLOW Straw/Yellow    Clarity, UA Clear Clear    Glucose, Ur 250 (A) Negative mg/dL    Bilirubin Urine Negative Negative    Ketones, Urine TRACE (A) Negative mg/dL    Specific Gravity, UA 1.019 1.005 - 1.030    Blood, Urine SMALL (A) Negative    pH, UA 5.5 5.0 - 8.0    Protein, UA TRACE (A) Negative mg/dL    Urobilinogen, Urine 0.2 <2.0 E.U./dL    Nitrite, Urine Negative Negative    Leukocyte Esterase, Urine Negative Negative   Microscopic Urinalysis    Collection Time: 06/03/21 12:13 PM   Result Value Ref Range    Bacteria, UA NEGATIVE (A) None Seen /HPF    Crystals, UA NEG (A) None Seen /HPF    Hyaline Casts, UA 3 0 - 8 /HPF    WBC, UA 1 0 - 5 /HPF    RBC, UA 12 (H) 0 - 4 /HPF    Epithelial Cells, UA 1 0 - 5 /HPF   POCT Glucose    Collection Time: 06/03/21 12:47 PM   Result Value Ref Range    POC Glucose 145 (H) 70 - 99 mg/dl    Performed on AccuChek    Blood Gas, Arterial    Collection Time: 06/03/21  3:07 PM   Result Value Ref Range    pH, Arterial 7.360 7.350 - 7.450    pCO2, Arterial 41.0 35.0 - 45.0 mmHg    pO2, Arterial 141.0 (H) 80.0 - 100.0 mmHg    HCO3, Arterial 23.2 22.0 - 26.0 mmol/L    Base Excess, Arterial -2.2 (L) -2.0 - 2.0 mmol/L    Hemoglobin, Art, Extended 13.3 12.0 - 16.0 g/dL    O2 Sat, Arterial 96.4 >92 %    Carboxyhgb, Arterial 1.8 0.0 - 5.0 %    Methemoglobin, Arterial 1.7 <1.5 %    O2 Content, Arterial 18.3 Not Established mL/dL    O2 Therapy Unknown    Potassium, Whole Blood    Collection Time: 06/03/21  3:07 PM   Result Value Ref Range    Potassium, Whole Blood 4.1        Diet:  Diet NPO Exceptions are: Sips with Meds    Examination:  Vitals: BP (!) 148/60   Pulse 93   Temp 96.3 °F (35.7 °C) (Temporal)   Resp (!) 7   Ht 5' 5\" (1.651 m)   Wt 221 lb 3.2 oz (100.3 kg)   SpO2 99%   BMI 36.81 kg/m²      Intake/Output Summary (Last 24 hours) at 6/3/2021 1516  Last data filed at 6/3/2021 1400  Gross per 24 hour   Intake 1177.92 ml   Output 1800 ml   Net -622.08 ml     General appearance: She is obtunded and moaning in the ICU. HEENT: Exam; heent: Head: Normal, normocephalic, atraumatic. Lungs: clear to auscultation bilaterally  Heart: regular rate and rhythm, S1, S2 normal, no murmur, click, rub or gallop  Abdomen: soft, non-tender; bowel sounds normal; no masses,  no organomegaly  Extremities: extremities normal, atraumatic, no cyanosis or edema  Neurologic:  Obtunded. No response to voice or light tactile stimulus. She wakens briefly to noxious stimulus and moans. She does not follow commands or answer questions. She does not talk. No facial asymmetry noted. She does not move the right arm and it is flaccid. She localizes with the left arm. She withdraws the right leg and moves the left leg purposefully. Deep tendon reflexes are absent. A right Babinski sign is present while the left toe goes down to plantar stimulation. Assessment:   1. Suspect left hemisphere cerebral infarct. CT negative. Not sure if it is safe to MRI this soon after PFO closure device implanted.     2.         Recent right hemisphere strokes with history of left and right hemisphere strokes convincing for cardioembolism. The P2Y12 plt inh test indicates she is a Plavix nonresponder so after PFO closure, I'd recommend Brillinta. 2.         PFO with septal aneurysm  3.         Hypertension  4.         Diabetes    Plan:   1. If MRI poses any risk, will just re CT head in a day or two  2. PT/OT/ST  3. ASA/Brillinta/statin when able to take PO     Discharge planning: To be determined    Reviewed treatment plans with the patient and/or family. 35 minutes spent in face to face interaction and coordination of care.      Electronically signed by Payal Cardoso MD on 6/3/2021 at 3:16 PM

## 2021-06-03 NOTE — CONSULTS
**Physician Signature**  This document was electronically signed by: Karol Quinn MD  2021   10:15 PM    **Consult Information**  Member Facility: [address] [place] [address] MRN: [id number]  Visit/Encounter Number: [id number]  Consult ID: [id number]  Facility Time Zone: CT  Date and Time of Request: 2021 09:25 PM  Requesting Clinician: Marzena Oneal MD  Patient Name: Alvarez Cormier  YOB: 1955  Gender: Female  Patient identity was confirmed at the beginning of the consult with the   patient/family/staff using two personal identifiers: Patient name and       **Admission**  Admission Date: 2021  Chief reason for ICU admission: Altered Mental Status    **Core Metrics**  General orienting sentence for patient: 65F with hx of CVA. Had repair of   PFO . Post op developed hematoma in RIGHT groin. Received fentanyl for   pain and became obtunded, therefore sent to ICU. Now alert.   Chief physiologic deterioration: None - Stable patient  Is the patient on DVT prophylaxis?: Yes  Prophylaxis type: Mechanical  Is the patient on GI prophylaxis?: Not Indicated  Has this patient reached their nutritional goal?: No and issues are being   addressed  Are there current issues with pain management in this patient?:   No  Are there issues with skin integrity?: No  Are there issues with delirium?: No  Has the patient been mobilized?: No  Is this patient currently intubated?: No  Are there ethical or care philosophy or family issues?: No  Do you recommend an in depth evaluation?: No  Do you recommend the patient should: : Continue ICU level of care    **Inserted/Removed Devices**  Device Name: Lutz Catheter  Site of insertion: Bladder  Date of insertion: 2021

## 2021-06-03 NOTE — PROGRESS NOTES
Patient transported to CT and Radiology for Head CT and KUB. Patient transported back to ICU without complications.

## 2021-06-03 NOTE — CONSULTS
**Physician Signature**  This document was electronically signed by: Brittnee Uribe MD  2021   12:05 PM    **Consult Information**  Member Facility: [address] [place] [address] MRN: [id number]  Visit/Encounter Number: [id number]  Consult ID: [id number]  Facility Time Zone: CT  Date and Time of Request: 2021 05:58 AM  Requesting Clinician: Ricci Marie MD  Patient Name: Dionicio Dawkins  YOB: 1955  Gender: Female  Patient identity was confirmed at the beginning of the consult with the   patient/family/staff using two personal identifiers: Patient name and       **Admission**  Admission Date: 2021  Chief reason for ICU admission: Altered Mental Status  Other reason for ICU admission: CVA, Obstipation. **Core Metrics**  General orienting sentence for patient: 65F with hx of CVA. Had repair of   PFO . Post op developed hematoma in RIGHT groin. Received fentanyl for   pain and became obtunded, therefore sent to ICU. Now alert. abdomen showed   FOS, now getting MOM, ducolax-->BM x 1. Less responsive, CT   negative. EEG pending. Central line placed b/o poor IV access.  BP high   normal.  Chief physiologic deterioration: Change in mental status  Is the patient on DVT prophylaxis?: Yes  Prophylaxis type: Mechanical  DVT Prophylaxis Comments: SCDs  Is the patient on GI prophylaxis?: Not Indicated  Has this patient reached their nutritional goal?: No and issues are not being   addressed  Nutritional Goals Details: NPO  Are there current issues with pain management in this patient?:   No  Are there issues with skin integrity?: No  Are there issues with delirium?: Yes and issues are being addressed  Delirium Comments: Workup in progress  Has the patient been mobilized?: No  Is this patient currently intubated?: No  Are there ethical or care philosophy or family issues?: No  Do you recommend an in depth evaluation?: No  Do you recommend the patient should: : Continue ICU level of care    **Inserted/Removed Devices**  Device Name: Lutz Catheter  Site of insertion: Bladder  Date of insertion: 06-  Device Name: Central venous catheter  Site of insertion: Interjugular - Right  Date of insertion: 06-

## 2021-06-03 NOTE — PROGRESS NOTES
Cardiology Daily Note   Jenni Villalobos MD      Patient:  Virginia Koehler  [id number]    Patient Active Problem List    Diagnosis Date Noted    Status post patent foramen ovale closure 06/02/2021    Right hemisphere, cerebral infarction [facility]) 05/29/2021    Palliative care patient 06/03/2021    History of multiple strokes 05/31/2021    Acute left-sided weakness 05/29/2021    Hypertension 05/29/2021    CAD (coronary artery disease) 05/29/2021    History of heart artery stent 05/29/2021    COPD (chronic obstructive pulmonary disease) ([address] 05/29/2021    Dyspnea 05/29/2021    Falls frequently 05/29/2021    Gait instability 05/29/2021    Hyperlipidemia associated with type 2 diabetes mellitus ([address] 05/29/2021    Morbid obesity ([address] 05/29/2021    CKD (chronic kidney disease) stage 3, GFR 30-59 ml/min 05/29/2021    Narcotic habituation, continuous ([address] 05/29/2021    Edentulous 05/29/2021    Patent foramen ovale with atrial septal aneurysm 05/29/2021    Nephropathy 05/29/2021    Neuropathy 05/29/2021       Admit Date:  5/29/2021    Admission Problem List: Present on Admission:   Acute left-sided weakness   Hypertension   CAD (coronary artery disease)   History of heart artery stent   COPD (chronic obstructive pulmonary disease) ([address]   Dyspnea   Falls frequently   Gait instability   Hyperlipidemia associated with type 2 diabetes mellitus (HCC)   Morbid obesity (HCC)   CKD (chronic kidney disease) stage 3, GFR 30-59 ml/min   Narcotic habituation, continuous ([address]   Edentulous   Right hemisphere, cerebral infarction ([address]   Patent foramen ovale with atrial septal aneurysm   Nephropathy   Neuropathy   History of multiple strokes   Status post patent foramen ovale closure   Palliative care patient      Cardiac Specific Data:  Specialty Problems        Cardiology Problems    CAD (coronary artery disease)        CVA (cerebral vascular accident) ([address]        Hypertension        Patent foramen ovale with atrial septal aneurysm              Subjective:  Ms. Jesus Manuel Mendez is status post successful PFO closure yesterday  Patient was transferred to ICU yesterday because of altered mental status, nursing said she was obtunded and complaining of pain  However her exam showed no hematoma, head CT was negative  Hemoglobin is stable    Objective:   BP (!) 147/59   Pulse 77   Temp 96.3 °F (35.7 °C) (Temporal)   Resp 9   Ht 5' 5\" (1.651 m)   Wt 221 lb 3.2 oz (100.3 kg)   SpO2 100%   BMI 36.81 kg/m²       Intake/Output Summary (Last 24 hours) at 6/3/2021 1354  Last data filed at 6/3/2021 1200  Gross per 24 hour   Intake 1077.92 ml   Output 1800 ml   Net -722.08 ml       Prior to Admission medications    Medication Sig Start Date End Date Taking? Authorizing Provider   glipiZIDE (GLUCOTROL XL) 5 MG extended release tablet Take 5 mg by mouth daily   Yes Historical Provider, MD   furosemide (LASIX) 20 MG tablet Take 20 mg by mouth daily   Yes Historical Provider, MD   losartan (COZAAR) 100 MG tablet Take 100 mg by mouth daily   Yes Historical Provider, MD   oxyCODONE-acetaminophen (PERCOCET)  MG per tablet Take 1 tablet by mouth every 6 hours as needed for Pain. Yes Historical Provider, MD   potassium chloride (MICRO-K) 10 MEQ extended release capsule Take 10 mEq by mouth 2 times daily   Yes Historical Provider, MD   pregabalin (LYRICA) 150 MG capsule Take 150 mg by mouth 2 times daily.    Yes Historical Provider, MD        sodium chloride flush  5-40 mL Intravenous 2 times per day    sodium chloride flush  5-40 mL Intravenous 2 times per day    clopidogrel  75 mg Oral Daily    bisacodyl  10 mg Rectal Daily    magnesium hydroxide  30 mL Oral Daily    aspirin  81 mg Oral Daily    insulin lispro  0-6 Units Subcutaneous TID WC    insulin lispro  0-3 Units Subcutaneous Nightly       TELEMETRY: Sinus     Physical Exam:    Physical Exam    General:  Awake, alert  Skin:  Warm and dry  Neck:  no jvd , no carotid +------------+-------+-------+--------+-------+------------+---------------+ ! Vertebral   !65.3   !19.7   !56      !0.7    ! !               ! +------------+-------+-------+--------+-------+------------+---------------+   - There is antegrade vertebral flow noted on the right side. - Additional Measurements:ICAPSV/CCAPSV 0.95. ICAEDV/CCAEDV 1.11. Carotid Left Measurements +------------+-------+-------+--------+-------+------------+---------------+ ! Location    ! PSV    ! EDV    ! Angle   ! RI     !%Stenosis   ! Tortuosity     ! +------------+-------+-------+--------+-------+------------+---------------+ ! Prox CCA    !109    !26.7   ! 60      !0.76   !            !               ! +------------+-------+-------+--------+-------+------------+---------------+ ! Mid CCA     !77     !25.9   !60      !0.66   !            !               ! +------------+-------+-------+--------+-------+------------+---------------+ ! Prox ICA    !53.4   !20.8   !60      !0.61   !            !               ! +------------+-------+-------+--------+-------+------------+---------------+ ! Mid ICA     ! 85     !27.6   ! 60      !0.68   !            !               ! +------------+-------+-------+--------+-------+------------+---------------+ ! Dist ICA    !56.3   !23.5   !60      !0.58   !            !               ! +------------+-------+-------+--------+-------+------------+---------------+ ! Prox ECA    !90.4   !18.9   !60      !0.79   !            !               ! +------------+-------+-------+--------+-------+------------+---------------+ ! Vertebral   !46     !17.7   ! 60      !0.62   !            !               ! +------------+-------+-------+--------+-------+------------+---------------+   - There is antegrade vertebral flow noted on the left side. - Additional Measurements:ICAPSV/CCAPSV 0.78. ICAEDV/CCAEDV 1.03.     MRI BRAIN WO CONTRAST    Result Date: 5/29/2021  Exam: MRI BRAIN WO CONTRAST - 5/29/2021 11:31 AM Indication: Stroke like symptoms Comparison: None available. Findings: Small acute infarcts in the RIGHT periventricular white matter and RIGHT putamen. There is some matching T2 FLAIR hyperintense signal. No increased susceptibility to suggest acute or chronic hemorrhage. Major physiologic flow voids are maintained. RIGHT frontal, LEFT parietal, and RIGHT occipital areas of encephalomalacia likely representing old infarcts. Chronic microvascular ischemic white matter change. Global cerebral volume loss. No midline shift or mass effect. Lateral ventricles are nondilated. Basilar cisterns are patent. Sella turcica and its contents appear unremarkable. No acute orbital finding. Mastoid air cells and paranasal sinuses are clear. 1.  Small acute infarcts in the RIGHT periventricular white matter and RIGHT basal ganglia. 2.  Multiple bilateral remote infarcts. Signed by Dr Maryjane Anand on 5/29/2021 2:43 PM    ECHO 2D WO COLOR DOPPLER COMPLETE    Result Date: 5/29/2021  Transthoracic Echocardiography Report (TTE)  Demographics   Patient Name  Nickolas  Date of Study         05/29/2021   MRN           [id number]       Gender                Female   Date of Birth 1955   Room Number           ZRI-5559   Age           63 year(s)   Height:       65 inches    Referring Physician   Keiko Prakash MD   Weight:       220 pounds   Sonographer           Loly Beck RDCS   BSA:          2.06 m^2     Interpreting          Dileep Shoemaker MD                             Physician   BMI:          36.61 kg/m^2  Procedure Type of Study   TTE procedure:ECHOCARDIOGRAM COMPLETE 2D WO COLOR DOPPLER. Study Location: Echo Lab Technical Quality: Adequate visualization Patient Status: Inpatient Contrast Medium: Bubble Study. Indications:Patent foramen ovale (PFO). Conclusions   Summary  Normal size left atrium. positive bubble study  Normal right atrial dimension with no evidence of thrombus or mass noted.   Normal right ventricular size with preserved RV function. Normal right ventricular size with preserved RV function. pfo present  Normal right ventricular size with preserved RV function. pfo present. large shunt. aneurysmal ias. No evidence of significant pericardial effusion is noted. No evidence of pleural effusion. Structurally normal mitral valve with normal leaflet mobility. No evidence  of mitral valve stenosis or mild mitral regurgitation. Aortic valve appears to be tricuspid. Structurally normal aortic valve. No significant aortic regurgitation or stenosis is noted. Tricuspid valve is structurally normal.  No evidence of tricuspid regurgitation. Signature     ----------------------------------------------------------------    Electronically signed by Cristina Hurley MD(Interpreting  physician) on 05/29/2021 03:38 PM  ----------------------------------------------------------------     Findings   Mitral Valve  Structurally normal mitral valve with normal leaflet mobility. No evidence  of mitral valve stenosis or mild mitral regurgitation. Aortic Valve  Aortic valve appears to be tricuspid. Structurally normal aortic valve. No significant aortic regurgitation or stenosis is noted. Tricuspid Valve  Tricuspid valve is structurally normal.  No evidence of tricuspid regurgitation. Left Atrium  Normal size left atrium. positive bubble study   Left Ventricle  Normal left ventricular size with preserved LV function and an estimated  ejection fraction of approximately 55-60%. No evidence of left ventricular mass or thrombus noted. Right Atrium  Normal right atrial dimension with no evidence of thrombus or mass noted. Right Ventricle  Normal right ventricular size with preserved RV function. pfo present. large shunt. aneurysmal ias. Pericardial Effusion  No evidence of significant pericardial effusion is noted. Pleural Effusion  No evidence of pleural effusion.   M-Mode Measurements (cm)   LVIDd: 4.45 cm LVIDs: 3.29 cm  IVSd: 1.01 cm  LVPWd: 1.02 cm                       AO Root Dimension: 3.1 cm  % Ejection Fraction: 51 %            LA: 3 cm                                       LVOT: 1.9 cm  Doppler Measurements:   AV Peak Velocity:113 cm/s           MV Peak E-Wave: 50.4 cm/s  AV Peak Gradient: 5.11 mmHg         MV Peak A-Wave: 61.4 cm/s                                      MV E/A Ratio: 0.82 %                                      MV Peak Gradient: 1.02 mmHg     -------------------------  Impression   No new acute intracranial abnormality. No change since   previous MRI dated 5/29/2021. The foci of decreased attenuation   representing acute/subacute infarction in the right periventricular   white matter and the right basal ganglia are identified. A focus of   encephalomalacia/chronic infarction in the left posterior frontal   subcortical white matter was also noted in the previous MR imaging of   the brain. No change. Signed by Dr Alisha Loya on 6/3/2021 11:03 AM       Echo Brittany 3, 2021  Patient Status: Inpatient     Indications:Patent foramen ovale (PFO).     Conclusions      Summary   Limited study for post PFO closure device placement   LV contractility appears to be normal, estimated ejection fraction 65%. An inter atrial septal closure device is seen in place, no gross   abnormality   No shunt seen based upon color flow interrogation; bubble study not   performed      Signature      ----------------------------------------------------------------   Electronically signed by Prince Elizabeth DO(Interpreting   physician) on 06/03/2021 08:48 AM   ----------------------------------------------------------------      Assessment:  1. Recurrent stroke .  Patient had a history of recurrent stroke, this is the fourth time, she was found to have patent foramen ovale with atrial septal aneurysm, she is status post successful PFO closure yesterday using 35 mm Amplatzer PFO closure device without any complications, 2D echo was done this morning and showed normal diastolic function with no pericardial effusion and closure of the intra-atrial septum with device with no evidence of shunting, patient is hemodynamically stable today, she may need consultation with inpatient rehab for post stroke care  2. DM  3. HTN  4.  COPD         Ravindra Mujica MD, MD 6/3/2021 1:54 PM       Ravindra Mujica MD, [facility] - [facility]  Interventional Cardiologist, Endovascular Specialist   Medical Director, Structural Heart Program   [facility]

## 2021-06-04 NOTE — PROGRESS NOTES
Speech Language Pathology  Facility/Department: [facility] ICU    NAME: Rohit Flores  : 1955  MRN: [id number]     Patient placed on hold this date secondary to decreased responsiveness. Consider short-term alternative means of nutrition. Will continue to follow.      Electronically signed by ANNABELLA Harper on 2021 at 1:31 PM

## 2021-06-04 NOTE — PROGRESS NOTES
Report from nursing this morning and pt continues to be poorly responsive, moaning and not following commands. Call to pt's son Dex Vergara to support and review goals of care. He does not seem to understand the magnitude of her condition. Dex Vergara, repeatedly said, \"I just want my mom to get better, you know, get fixed up and come home. \"  Explained to him he is her contact and closest family and may be needed to make decisions. He became tearful and says, \"This just breaks my heart, please tell her that I love her every time you see her. \"  Attempts to review goals of care and speak long term about wishes and he is unprepared and unable at this time to make any decisions. Emotional support provided and asked Dex Vergara who gives him support or might assist with decision making and he says they have no other family. He had a sister that passed away. Will continue to follow pt and to support her son as we determine her course of treatment.     Electronically signed by Everton Sparks RN on 6/4/2021 at 10:16 AM

## 2021-06-04 NOTE — PROGRESS NOTES
Hospitalist Progress Note    Patient:  Indy Walters  YOB: 1955  Date of Service: 6/4/2021  MRN: [id number]   Acct: [de-identified]   Primary Care Physician: Paco Walker MD  Advance Directive: Full Code  Admit Date: 5/29/2021       Hospital Day: 6  Referring Provider: Severa Cabal, DO    Patient Seen, Chart, Consults, Notes, Labs, Radiology studies reviewed. Subjective:  Indy Walters is a 63 y.o. female  whom we are following for mental status change, encephalopathy, new acute ischemic right hemispheric stroke. She underwent a PFO closure on 6/2. Post procedure she has had persistent mental status changes. She was transferred to ICU for closer monitoring. Neurology and cardiology are following. She will just lie and moan in bed. She does not follow commands. No sign of seizure activity on EEG. She had a repeat MRI of the brain that did not show any new infarct however there was quite a bit of motion artifact.     Allergies:  Ibuprofen, Metformin, Hydrocodone, and Nsaids    Medicines:  Current Facility-Administered Medications   Medication Dose Route Frequency Provider Last Rate Last Admin    ticagrelor (BRILINTA) tablet 90 mg  90 mg Oral BID Naga Savage MD   90 mg at 06/04/21 1144    LORazepam (ATIVAN) injection 0.5 mg  0.5 mg Intravenous Q4H PRN Sujey Carreno MD   0.5 mg at 06/04/21 1500    valproate (DEPACON) 500 mg in dextrose 5 % 100 mL IVPB  500 mg Intravenous Q8H Sujey Carreno MD   Stopped at 06/04/21 1413    oxymetazoline (AFRIN) 0.05 % nasal spray 2 spray  2 spray Each Nostril BID Severa Cabal, DO   2 spray at 06/04/21 1617    valproate (DEPACON) 1,500 mg in sodium chloride 0.9 % 100 mL IVPB  1,500 mg Intravenous Once Sujey Carreno  mL/hr at 06/04/21 1700 1,500 mg at 06/04/21 1700    hydrALAZINE (APRESOLINE) injection 5 mg  5 mg Intravenous Q4H PRN Ijeoma Oro MD   5 mg at 06/04/21 1706    0.9 % sodium chloride infusion   Intravenous Continuous Mitchell Butcher  mL/hr at 06/04/21 1600 Rate Verify at 06/04/21 1600    perflutren lipid microspheres (DEFINITY) injection 1.65 mg  1.5 mL Intravenous ONCE PRN ALEXYS Hines - CNP        sodium chloride flush 0.9 % injection 5-40 mL  5-40 mL Intravenous 2 times per day ALEXYS Hines - CNP   10 mL at 06/04/21 0816    sodium chloride flush 0.9 % injection 5-40 mL  5-40 mL Intravenous PRN ALEXYS Gutierrez - CNP        0.9 % sodium chloride infusion  25 mL Intravenous PRN ALEXYS Hines - CNP        acetaminophen (TYLENOL) tablet 650 mg  650 mg Oral Q4H PRN ALEXYS Hines CNP        0.9 % sodium chloride infusion  25 mL Intravenous PRN Virgilio Nettles MD        bisacodyl (DULCOLAX) suppository 10 mg  10 mg Rectal Daily Mitchell Butcher MD   10 mg at 06/04/21 0816    magnesium hydroxide (MILK OF MAGNESIA) 400 MG/5ML suspension 30 mL  30 mL Oral Daily Mitchell Butcher MD   30 mL at 06/04/21 1144    polyethylene glycol (GLYCOLAX) packet 17 g  17 g Oral Daily PRN Armida Jasso MD        acetaminophen (TYLENOL) suppository 650 mg  650 mg Rectal Q6H PRN Armida Jasso MD        potassium chloride (KLOR-CON M) extended release tablet 40 mEq  40 mEq Oral PRN Armida Jasso MD        Or    potassium bicarb-citric acid (EFFER-K) effervescent tablet 40 mEq  40 mEq Oral PRN Armida Jasso MD        Or    potassium chloride 10 mEq/100 mL IVPB (Peripheral Line)  10 mEq Intravenous PRN Armida Jasso MD        potassium chloride 10 mEq/100 mL IVPB (Peripheral Line)  10 mEq Intravenous PRN Armida aJsso MD        ondansetron (ZOFRAN) injection 4 mg  4 mg Intravenous Q6H PRN Armida Jasso MD        aspirin chewable tablet 81 mg  81 mg Oral Daily Armida Jasso MD   81 mg at 06/04/21 1144    insulin lispro (HUMALOG) injection vial 0-6 Units  0-6 Units Subcutaneous TID WC Baldo Cooper MD   1 Units at 21 1706    insulin lispro (HUMALOG) injection vial 0-3 Units  0-3 Units Subcutaneous Nightly Baldo Cooper MD   1 Units at 21 210    glucose (GLUTOSE) 40 % oral gel 15 g  15 g Oral PRN Baldo Cooper MD        dextrose 50 % IV solution  12.5 g Intravenous PRN Baldo Cooper MD        glucagon (rDNA) injection 1 mg  1 mg Intramuscular PRN Baldo Copoer MD        dextrose 5 % solution  100 mL/hr Intravenous PRN Baldo Cooper MD           Past Medical History:  Past Medical History:   Diagnosis Date    [facility] occlusion [facility]     COPD (chronic obstructive pulmonary disease) ([facility]     Diabetes mellitus ([facility]     Hypertension     Neuromuscular disorder ([facility]     Palliative care patient 2021       Past Surgical History:  Past Surgical History:   Procedure Laterality Date    ABDOMEN SURGERY      gastric resection    APPENDECTOMY      CARDIAC SURGERY       SECTION      x2    CHOLECYSTECTOMY      CORONARY ANGIOPLASTY      SPLENECTOMY         Family History  No family history on file. Social History  Social History     Socioeconomic History    Marital status:       Spouse name: Not on file    Number of children: Not on file    Years of education: Not on file    Highest education level: Not on file   Occupational History    Not on file   Tobacco Use    Smoking status: Former Smoker     Packs/day: 1.00     Years: 28.00     Pack years: 28.00     Types: Cigarettes     Quit date: 2019     Years since quittin.4    Smokeless tobacco: Never Used   Vaping Use    Vaping Use: Never used   Substance and Sexual Activity    Alcohol use: Not Currently    Drug use: Never    Sexual activity: Not Currently   Other Topics Concern    Not on file   Social History Narrative    Not on file     Social Determinants of Health     Financial Resource Strain:     Difficulty of Paying Living Expenses:    Food Insecurity:     Worried About Running Out of Food in the Last Year:     [address] in the Last Year:    Transportation Needs:     Lack of Transportation (Medical):  Lack of Transportation (Non-Medical):    Physical Activity:     Days of Exercise per Week:     Minutes of Exercise per Session:    Stress:     Feeling of Stress :    Social Connections:     Frequency of Communication with Friends and Family:     Frequency of Social Gatherings with Friends and Family:     Attends [employer] Services:     Active Member of Clubs or Organizations:     Attends Club or Organization Meetings:     Marital Status:    Intimate Partner Violence:     Fear of Current or Ex-Partner:     Emotionally Abused:     Physically Abused:     Sexually Abused:          Review of Systems:    Review of Systems   Unable to perform ROS: Mental status change           Objective:  Blood pressure (!) 174/100, pulse 128, temperature 97.2 °F (36.2 °C), temperature source Temporal, resp. rate 13, height 5' 5\" (1.651 m), weight 223 lb 14.4 oz (101.6 kg), SpO2 93 %. Intake/Output Summary (Last 24 hours) at 6/4/2021 1751  Last data filed at 6/4/2021 1600  Gross per 24 hour   Intake 2460 ml   Output 1850 ml   Net 610 ml       Physical Exam  Vitals and nursing note reviewed. Constitutional:       Appearance: She is ill-appearing. HENT:      Head: Normocephalic and atraumatic. Right Ear: External ear normal.      Left Ear: External ear normal.      Nose: Nose normal.      Mouth/Throat:      Mouth: Mucous membranes are moist.   Eyes:      Conjunctiva/sclera: Conjunctivae normal.      Pupils: Pupils are equal, round, and reactive to light. Cardiovascular:      Rate and Rhythm: Normal rate and regular rhythm. Heart sounds: Normal heart sounds. Pulmonary:      Effort: Pulmonary effort is normal.      Breath sounds: Normal breath sounds.    Abdominal: General: Abdomen is flat. Palpations: Abdomen is soft. Musculoskeletal:         General: Normal range of motion. Cervical back: Neck supple. No rigidity. No muscular tenderness. Skin:     General: Skin is warm and dry. Labs:  BMP:   Recent Labs     06/02/21  0112 06/03/21  1159 06/03/21  1507 06/04/21  0428    140  --  138   K 4.2 4.4 4.1 3.9    105  --  105   CO2 22 24  --  23   BUN 21 20  --  20   CREATININE 1.8* 1.3*  --  1.1*   CALCIUM 9.3 9.2  --  9.1     CBC:   Recent Labs     06/03/21  0627 06/03/21  1159 06/04/21  0428   WBC 21.9* 24.5* 21.5*   HGB 14.6 12.8 12.0   HCT 44.4 38.0 36.0*   MCV 97.4 94.8 94.2    345 326     LIVER PROFILE:   Recent Labs     06/02/21 0112   AST 26   ALT 10   BILITOT 0.3   ALKPHOS 135*     PT/INR:   Recent Labs     06/02/21 0112   PROTIME 13.6   INR 1.05     APTT: No results for input(s): APTT in the last 72 hours. BNP:  No results for input(s): BNP in the last 72 hours. Ionized Calcium:No results for input(s): IONCA in the last 72 hours. Magnesium:No results for input(s): MG in the last 72 hours. Phosphorus:No results for input(s): PHOS in the last 72 hours. HgbA1C: No results for input(s): LABA1C in the last 72 hours. Hepatic:   Recent Labs     06/02/21 0112   ALKPHOS 135*   ALT 10   AST 26   PROT 7.0   BILITOT 0.3   LABALBU 3.2*     Lactic Acid: No results for input(s): LACTA in the last 72 hours. Troponin: No results for input(s): CKTOTAL, CKMB, TROPONINT in the last 72 hours. ABGs: No results for input(s): PH, PCO2, PO2, HCO3, O2SAT in the last 72 hours. CRP:  No results for input(s): CRP in the last 72 hours. Sed Rate:  No results for input(s): SEDRATE in the last 72 hours. Cultures:   No results for input(s): CULTURE in the last 72 hours. No results for input(s): Chioma NICHOLS in the last 72 hours. No results for input(s): CXSURG in the last 72 hours.     Radiology reports as per the Radiologist  Radiology: XR CHEST PORTABLE    Result Date: 5/30/2021  Exam: XR CHEST PORTABLE - 5/30/2021 10:34 AM Indication: Leukocytosis Comparison: None available. Findings: Cardiomediastinal silhouette is within normal limits. No pleural effusion, pneumothorax, or focal consolidation. No acute osseous findings. Surgical clips in the LEFT upper abdomen. No acute findings. Signed by Dr Lachelle Kern on 5/30/2021 11:54 AM    VL DUP CAROTID BILATERAL    Result Date: 5/31/2021  Vascular Carotid Procedure  Demographics   Patient Name    Latasha Hernandez Age                   63   Patient Number  [id number]       Gender                Female   Visit Number    [id number]    Interpreting          Maru Sandoval MD                               Physician   Date of Birth   1955   Referring Physician   Kev Landa   Accession       [id number]   [address] [facility], [facility], [address]  Number  Procedure Type of Study:   Cerebral:Carotid, VL CAROTID BILATERAL. Indications for Study:Stroke. Risk Factors   - The patient's risk factor(s) include: diabetes mellitus and arterial     hypertension.   - The patient has a former tobacco history. Impression   Duplex sonography with color flow enhancement was performed bilaterally on  the cervical carotid system. No evidence of hemodynamically significant  stenosis in the bilateral carotid and vertebral arteries. Signature   ----------------------------------------------------------------  Electronically signed by Maru Sandoval MD(Interpreting  physician) on 05/31/2021 06:32 AM  ----------------------------------------------------------------  Blood Pressure:Right arm 136/82 mmHg. Left arm 134/80 mmHg. Velocities are measured in cm/s ; Diameters are measured in mm Carotid Right Measurements +------------+-------+-------+--------+-------+------------+---------------+ ! Location    ! PSV    ! EDV    ! Angle   ! RI     !%Stenosis   ! Tortuosity     ! +------------+-------+-------+--------+-------+------------+---------------+ ! Prox CCA    !88     !27.5   !60      !0.69   !            !               ! +------------+-------+-------+--------+-------+------------+---------------+ ! Mid CCA     !77     !25.1   ! 60      !0.67   !            !               ! +------------+-------+-------+--------+-------+------------+---------------+ ! Prox ICA    !63.6   !23.6   ! 60      !0.63   !            !               ! +------------+-------+-------+--------+-------+------------+---------------+ ! Mid ICA     !72.3   !30.6   ! 60      !0.58   !            !               ! +------------+-------+-------+--------+-------+------------+---------------+ ! Dist ICA    !83.3   !25.9   !60      !0.69   !            !               ! +------------+-------+-------+--------+-------+------------+---------------+ ! Prox ECA    !67.6   !14.9   !60      !0.78   !            !               ! +------------+-------+-------+--------+-------+------------+---------------+ ! Vertebral   !65.3   !19.7   !56      !0.7    ! !               ! +------------+-------+-------+--------+-------+------------+---------------+   - There is antegrade vertebral flow noted on the right side. - Additional Measurements:ICAPSV/CCAPSV 0.95. ICAEDV/CCAEDV 1.11. Carotid Left Measurements +------------+-------+-------+--------+-------+------------+---------------+ ! Location    ! PSV    ! EDV    ! Angle   ! RI     !%Stenosis   ! Tortuosity     ! +------------+-------+-------+--------+-------+------------+---------------+ ! Prox CCA    !109    !26.7   ! 60      !0.76   !            !               ! +------------+-------+-------+--------+-------+------------+---------------+ ! Mid CCA     !77     !25.9   !60      !0.66   !            !               ! +------------+-------+-------+--------+-------+------------+---------------+ ! Prox ICA    !53.4   !20.8   !60      !0.61   !            !               ! +------------+-------+-------+--------+-------+------------+---------------+ ! Mid ICA     ! 85     !27.6   ! 60      !0.68   !            !               ! +------------+-------+-------+--------+-------+------------+---------------+ ! Dist ICA    !56.3   !23.5   !60      !0.58   !            !               ! +------------+-------+-------+--------+-------+------------+---------------+ ! Prox ECA    !90.4   !18.9   !60      !0.79   !            !               ! +------------+-------+-------+--------+-------+------------+---------------+ ! Vertebral   !46     !17.7   ! 60      !0.62   !            !               ! +------------+-------+-------+--------+-------+------------+---------------+   - There is antegrade vertebral flow noted on the left side. - Additional Measurements:ICAPSV/CCAPSV 0.78. ICAEDV/CCAEDV 1.03. MRI BRAIN WO CONTRAST    Result Date: 5/29/2021  Exam: MRI BRAIN WO CONTRAST - 5/29/2021 11:31 AM Indication: Stroke like symptoms Comparison: None available. Findings: Small acute infarcts in the RIGHT periventricular white matter and RIGHT putamen. There is some matching T2 FLAIR hyperintense signal. No increased susceptibility to suggest acute or chronic hemorrhage. Major physiologic flow voids are maintained. RIGHT frontal, LEFT parietal, and RIGHT occipital areas of encephalomalacia likely representing old infarcts. Chronic microvascular ischemic white matter change. Global cerebral volume loss. No midline shift or mass effect. Lateral ventricles are nondilated. Basilar cisterns are patent. Sella turcica and its contents appear unremarkable. No acute orbital finding. Mastoid air cells and paranasal sinuses are clear. 1.  Small acute infarcts in the RIGHT periventricular white matter and RIGHT basal ganglia. 2.  Multiple bilateral remote infarcts.  Signed by Dr Yudith Chopra on 5/29/2021 2:43 PM    ECHO 2D WO COLOR DOPPLER COMPLETE    Result Date: 5/29/2021  Transthoracic Echocardiography Report (TTE) Demographics   Patient Name  Napoleon Nathan Date of Study         05/29/2021   MRN           [id number]       Gender                Female   Date of Birth 1955   Room Number           NSN-4430   Age           63 year(s)   Height:       65 inches    Referring Physician   Tra Ziegler MD   Weight:       220 pounds   Sonographer           Beck Loly ENRIKE   BSA:          2.06 m^2     Interpreting          Nickolas Almazan MD                             Physician   BMI:          36.61 kg/m^2  Procedure Type of Study   TTE procedure:ECHOCARDIOGRAM COMPLETE 2D WO COLOR DOPPLER. Study Location: Echo Lab Technical Quality: Adequate visualization Patient Status: Inpatient Contrast Medium: Bubble Study. Indications:Patent foramen ovale (PFO). Conclusions   Summary  Normal size left atrium. positive bubble study  Normal right atrial dimension with no evidence of thrombus or mass noted. Normal right ventricular size with preserved RV function. Normal right ventricular size with preserved RV function. pfo present  Normal right ventricular size with preserved RV function. pfo present. large shunt. aneurysmal ias. No evidence of significant pericardial effusion is noted. No evidence of pleural effusion. Structurally normal mitral valve with normal leaflet mobility. No evidence  of mitral valve stenosis or mild mitral regurgitation. Aortic valve appears to be tricuspid. Structurally normal aortic valve. No significant aortic regurgitation or stenosis is noted. Tricuspid valve is structurally normal.  No evidence of tricuspid regurgitation. Signature   ----------------------------------------------------------------  Electronically signed by Nickolas Almazan MD(Interpreting  physician) on 05/29/2021 03:38 PM  ----------------------------------------------------------------   Findings   Mitral Valve  Structurally normal mitral valve with normal leaflet mobility.  No evidence  of mitral valve stenosis or mild mitral regurgitation. Aortic Valve  Aortic valve appears to be tricuspid. Structurally normal aortic valve. No significant aortic regurgitation or stenosis is noted. Tricuspid Valve  Tricuspid valve is structurally normal.  No evidence of tricuspid regurgitation. Left Atrium  Normal size left atrium. positive bubble study   Left Ventricle  Normal left ventricular size with preserved LV function and an estimated  ejection fraction of approximately 55-60%. No evidence of left ventricular mass or thrombus noted. Right Atrium  Normal right atrial dimension with no evidence of thrombus or mass noted. Right Ventricle  Normal right ventricular size with preserved RV function. pfo present. large shunt. aneurysmal ias. Pericardial Effusion  No evidence of significant pericardial effusion is noted. Pleural Effusion  No evidence of pleural effusion. M-Mode Measurements (cm)   LVIDd: 4.45 cm                       LVIDs: 3.29 cm  IVSd: 1.01 cm  LVPWd: 1.02 cm                       AO Root Dimension: 3.1 cm  % Ejection Fraction: 51 %            LA: 3 cm                                       LVOT: 1.9 cm  Doppler Measurements:   AV Peak Velocity:113 cm/s           MV Peak E-Wave: 50.4 cm/s  AV Peak Gradient: 5.11 mmHg         MV Peak A-Wave: 61.4 cm/s                                      MV E/A Ratio: 0.82 %                                      MV Peak Gradient: 1.02 mmHg         Assessment     Right hemisphere, cerebral infarction ischemic. Continue supportive care. Status post PFO closure. Continue supportive care. Persistent mental status change. Multifactorial.  Supportive care. Please document 35 minutes of critical care time for patient assessment, chart review, discussion with staff, .       Italo Abreu DO

## 2021-06-04 NOTE — CARE COORDINATION
[facility] [address] [facility]  Notification of Admission Decision      [] Patient has been accepted for admit to [facility] on :       Please write discharge orders and summary prior to discharge. [] Patient acceptance to Rehab pending the following :   [x] Eval in progress: pending progress, will f/u on Monday 6/7/21      [] Patient determined to be ineligible for services at [facility] because : We recommend you consider        Thank you for your referral, we appreciate you. If you have any questions, please feel   free to contact me at [phone].     Electronically Signed by Brody Cazares, Admissions Coordinator 6/4/2021 12:49 PM

## 2021-06-04 NOTE — PLAN OF CARE
Problem: Falls - Risk of:  Goal: Will remain free from falls  Description: Will remain free from falls  Outcome: Ongoing  Goal: Absence of physical injury  Description: Absence of physical injury  Outcome: Ongoing     Problem: Skin Integrity:  Goal: Will show no infection signs and symptoms  Description: Will show no infection signs and symptoms  Outcome: Ongoing  Goal: Absence of new skin breakdown  Description: Absence of new skin breakdown  Outcome: Ongoing     Problem: HEMODYNAMIC STATUS  Goal: Patient has stable vital signs and fluid balance  Outcome: Ongoing     Problem: ACTIVITY INTOLERANCE/IMPAIRED MOBILITY  Goal: Mobility/activity is maintained at optimum level for patient  Outcome: Ongoing     Problem: COMMUNICATION IMPAIRMENT  Goal: Ability to express needs and understand communication  Outcome: Ongoing     Problem: Pain:  Goal: Pain level will decrease  Description: Pain level will decrease  Outcome: Ongoing  Goal: Control of acute pain  Description: Control of acute pain  Outcome: Ongoing  Goal: Control of chronic pain  Description: Control of chronic pain  Outcome: Ongoing     Problem: Infection - Central Venous Catheter-Associated Bloodstream Infection:  Goal: Will show no infection signs and symptoms  Description: Will show no infection signs and symptoms  Outcome: Ongoing     Problem: Urinary Elimination:  Goal: Signs and symptoms of infection will decrease  Description: Signs and symptoms of infection will decrease  Outcome: Ongoing  Goal: Complications related to the disease process, condition or treatment will be avoided or minimized  Description: Complications related to the disease process, condition or treatment will be avoided or minimized  Outcome: Ongoing

## 2021-06-04 NOTE — PLAN OF CARE
Problem: Falls - Risk of:  Goal: Will remain free from falls  Description: Will remain free from falls  6/4/2021 0947 by Paola Marr RN  Outcome: Ongoing  6/4/2021 0043 by Rafael Ricardo RN  Outcome: Ongoing  Goal: Absence of physical injury  Description: Absence of physical injury  6/4/2021 0947 by Paola Marr RN  Outcome: Ongoing  6/4/2021 0043 by Rafael Ricardo RN  Outcome: Ongoing     Problem: Skin Integrity:  Goal: Will show no infection signs and symptoms  Description: Will show no infection signs and symptoms  6/4/2021 0947 by Paola Marr RN  Outcome: Ongoing  6/4/2021 0043 by Rafael Ricardo RN  Outcome: Ongoing  Goal: Absence of new skin breakdown  Description: Absence of new skin breakdown  6/4/2021 0947 by Paola Marr RN  Outcome: Ongoing  6/4/2021 0043 by Rafael Ricardo RN  Outcome: Ongoing     Problem: HEMODYNAMIC STATUS  Goal: Patient has stable vital signs and fluid balance  6/4/2021 0947 by Paola Marr RN  Outcome: Ongoing  6/4/2021 0043 by Rafael Ricardo RN  Outcome: Ongoing     Problem: ACTIVITY INTOLERANCE/IMPAIRED MOBILITY  Goal: Mobility/activity is maintained at optimum level for patient  6/4/2021 0947 by Paola Marr RN  Outcome: Ongoing  6/4/2021 0043 by Rafael Ricardo RN  Outcome: Ongoing     Problem: COMMUNICATION IMPAIRMENT  Goal: Ability to express needs and understand communication  6/4/2021 [phone] by Paola Marr RN  Outcome: Ongoing  6/4/2021 0043 by Rafael Ricardo RN  Outcome: Ongoing     Problem: Pain:  Goal: Pain level will decrease  Description: Pain level will decrease  6/4/2021 0947 by Paola Marr RN  Outcome: Ongoing  6/4/2021 0043 by Rafael Ricardo RN  Outcome: Ongoing  Goal: Control of acute pain  Description: Control of acute pain  6/4/2021 0947 by Paola Marr RN  Outcome: Ongoing  6/4/2021 0043 by Rafael Ricardo RN  Outcome: Ongoing  Goal: Control of chronic pain  Description: Control of chronic pain  6/4/2021 0947 by Paola Marr RN  Outcome: Ongoing  6/4/2021 0043 by Yvette Soliz RN  Outcome: Ongoing     Problem: Urinary Elimination:  Goal: Signs and symptoms of infection will decrease  Description: Signs and symptoms of infection will decrease  6/4/2021 0947 by Ashley Painter RN  Outcome: Ongoing  6/4/2021 0043 by Yvette Soliz RN  Outcome: Ongoing  Goal: Complications related to the disease process, condition or treatment will be avoided or minimized  Description: Complications related to the disease process, condition or treatment will be avoided or minimized  6/4/2021 0947 by Ashley Painter RN  Outcome: Ongoing  6/4/2021 0043 by Yvette Soliz RN  Outcome: Ongoing not applicable

## 2021-06-04 NOTE — PROGRESS NOTES
[id number] [facility] Neurology  [address] [place] [address] Drive, [address]  [place], [place] [address]  Phone [phone]     Neurology Progress Note  2021 11:16 AM  Information:   Patient Name: Jewel Ribeiro  :   1955  Age:   63 y.o. MRN:   [id number]  Account #:  [de-identified]  Admit Date:   2021  Today:  6/3/21     ADMIT DX:   Right hemisphere, cerebral infarction [facility])    Subjective:     Donya Srinivasan a 63y.o. year old woman with a history of hypertension, hyperlipidemia, diabetes, cardiovascular disease, and previous strokes who was transferred from [facility]  after having yet another stroke.      Interval History:   She had PFO closure . She developed abdominal pain, urine retention, and right groin hematoma after but was able to move and talk. At 8pm, she was noted to have diminished responsiveness but did have IV opioid. She was transferred to the ICU. She remained minimally responsive this am and CT head was performed and showed the recent right hemisphere stroke but nothing new. No significant improvement neurologically. Objective:     Past Medical History:  Past Medical History:   Diagnosis Date    Cerebral artery occlusion with cerebral infarction [facility]     COPD (chronic obstructive pulmonary disease) (HCC)     Diabetes mellitus ([facility] [facility]     Hypertension     Neuromuscular disorder ([facility] [address] [facility]     Palliative care patient 2021       Past Surgical History:   Procedure Laterality Date    ABDOMEN SURGERY      gastric resection    APPENDECTOMY      CARDIAC SURGERY       SECTION      x2    CHOLECYSTECTOMY      CORONARY ANGIOPLASTY      SPLENECTOMY         No family history on file. Social History     Socioeconomic History    Marital status:       Spouse name: Not on file    Number of children: Not on file    Years of education: Not on file    Highest education level: Not on file   Occupational History    Not on file   Tobacco Use    Smoking status: Former Smoker     Packs/day: 1.00 Years: 28.00     Pack years: 28.00     Types: Cigarettes     Quit date: 2019     Years since quittin.4    Smokeless tobacco: Never Used   Vaping Use    Vaping Use: Never used   Substance and Sexual Activity    Alcohol use: Not Currently    Drug use: Never    Sexual activity: Not Currently   Other Topics Concern    Not on file   Social History Narrative    Not on file     Social Determinants of Health     Financial Resource Strain:     Difficulty of Paying Living Expenses:    Food Insecurity:     Worried About Running Out of Food in the Last Year:     [address] in the Last Year:    Transportation Needs:     Lack of Transportation (Medical):      Lack of Transportation (Non-Medical):    Physical Activity:     Days of Exercise per Week:     Minutes of Exercise per Session:    Stress:     Feeling of Stress :    Social Connections:     Frequency of Communication with Friends and Family:     Frequency of Social Gatherings with Friends and Family:     Attends Holiness Services:     Active Member of Clubs or Organizations:     Attends Club or Organization Meetings:     Marital Status:    Intimate Partner Violence:     Fear of Current or Ex-Partner:     Emotionally Abused:     Physically Abused:     Sexually Abused:        Medications:   sodium chloride 100 mL/hr at 21 0800    sodium chloride      sodium chloride      dextrose        ticagrelor  90 mg Oral BID    sodium chloride flush  5-40 mL Intravenous 2 times per day    bisacodyl  10 mg Rectal Daily    magnesium hydroxide  30 mL Oral Daily    aspirin  81 mg Oral Daily    insulin lispro  0-6 Units Subcutaneous TID WC    insulin lispro  0-3 Units Subcutaneous Nightly       Diagnostic Studies:  Reviewed all labs/diagnositcs since last 24hrs:  Recent Results (from the past 24 hour(s))   Basic Metabolic Panel w/ Reflex to MG    Collection Time: 21 11:59 AM   Result Value Ref Range    Sodium 140 136 - 145 mmol/L Potassium reflex Magnesium 4.4 3.5 - 5.0 mmol/L    Chloride 105 98 - 111 mmol/L    CO2 24 22 - 29 mmol/L    Anion Gap 11 7 - 19 mmol/L    Glucose 182 (H) 74 - 109 mg/dL    BUN 20 8 - 23 mg/dL    CREATININE 1.3 (H) 0.5 - 0.9 mg/dL    GFR Non- 41 (A) >60    GFR  50 (L) >59    Calcium 9.2 8.8 - 10.2 mg/dL   PROBNP, N-TERMINAL    Collection Time: 06/03/21 11:59 AM   Result Value Ref Range    Pro- 0 - 900 pg/mL   TSH without Reflex    Collection Time: 06/03/21 11:59 AM   Result Value Ref Range    TSH 0.732 0.270 - 4.200 uIU/mL   Ammonia    Collection Time: 06/03/21 11:59 AM   Result Value Ref Range    Ammonia 33 11 - 51 umol/L   CBC    Collection Time: 06/03/21 11:59 AM   Result Value Ref Range    WBC 24.5 (H) 4.8 - 10.8 K/uL    RBC 4.01 (L) 4.20 - 5.40 M/uL    Hemoglobin 12.8 12.0 - 16.0 g/dL    Hematocrit 38.0 37.0 - 47.0 %    MCV 94.8 81.0 - 99.0 fL    MCH 31.9 (H) 27.0 - 31.0 pg    MCHC 33.7 33.0 - 37.0 g/dL    RDW 13.3 11.5 - 14.5 %    Platelets 333 908 - 681 K/uL    MPV 10.6 9.4 - 12.3 fL   Urinalysis Reflex to Culture    Collection Time: 06/03/21 12:13 PM    Specimen: Urine, clean catch   Result Value Ref Range    Color, UA YELLOW Straw/Yellow    Clarity, UA Clear Clear    Glucose, Ur 250 (A) Negative mg/dL    Bilirubin Urine Negative Negative    Ketones, Urine TRACE (A) Negative mg/dL    Specific Gravity, UA 1.019 1.005 - 1.030    Blood, Urine SMALL (A) Negative    pH, UA 5.5 5.0 - 8.0    Protein, UA TRACE (A) Negative mg/dL    Urobilinogen, Urine 0.2 <2.0 E.U./dL    Nitrite, Urine Negative Negative    Leukocyte Esterase, Urine Negative Negative   Microscopic Urinalysis    Collection Time: 06/03/21 12:13 PM   Result Value Ref Range    Bacteria, UA NEGATIVE (A) None Seen /HPF    Crystals, UA NEG (A) None Seen /HPF    Hyaline Casts, UA 3 0 - 8 /HPF    WBC, UA 1 0 - 5 /HPF    RBC, UA 12 (H) 0 - 4 /HPF    Epithelial Cells, UA 1 0 - 5 /HPF   POCT Glucose    Collection Time: 06/03/21 12:47 PM   Result Value Ref Range    POC Glucose 145 (H) 70 - 99 mg/dl    Performed on AccuChek    Blood Gas, Arterial    Collection Time: 06/03/21  3:07 PM   Result Value Ref Range    pH, Arterial 7.360 7.350 - 7.450    pCO2, Arterial 41.0 35.0 - 45.0 mmHg    pO2, Arterial 141.0 (H) 80.0 - 100.0 mmHg    HCO3, Arterial 23.2 22.0 - 26.0 mmol/L    Base Excess, Arterial -2.2 (L) -2.0 - 2.0 mmol/L    Hemoglobin, Art, Extended 13.3 12.0 - 16.0 g/dL    O2 Sat, Arterial 96.4 >92 %    Carboxyhgb, Arterial 1.8 0.0 - 5.0 %    Methemoglobin, Arterial 1.7 <1.5 %    O2 Content, Arterial 18.3 Not Established mL/dL    O2 Therapy Unknown    Potassium, Whole Blood    Collection Time: 06/03/21  3:07 PM   Result Value Ref Range    Potassium, Whole Blood 4.1    POCT Glucose    Collection Time: 06/03/21  5:48 PM   Result Value Ref Range    POC Glucose 187 (H) 70 - 99 mg/dl    Performed on AccuChek    POCT Glucose    Collection Time: 06/03/21  9:02 PM   Result Value Ref Range    POC Glucose 147 (H) 70 - 99 mg/dl    Performed on AccuChek    Basic Metabolic Panel w/ Reflex to MG    Collection Time: 06/04/21  4:28 AM   Result Value Ref Range    Sodium 138 136 - 145 mmol/L    Potassium reflex Magnesium 3.9 3.5 - 5.0 mmol/L    Chloride 105 98 - 111 mmol/L    CO2 23 22 - 29 mmol/L    Anion Gap 10 7 - 19 mmol/L    Glucose 151 (H) 74 - 109 mg/dL    BUN 20 8 - 23 mg/dL    CREATININE 1.1 (H) 0.5 - 0.9 mg/dL    GFR Non-African American 50 (A) >60    GFR African American >59 >59    Calcium 9.1 8.8 - 10.2 mg/dL   CBC    Collection Time: 06/04/21  4:28 AM   Result Value Ref Range    WBC 21.5 (H) 4.8 - 10.8 K/uL    RBC 3.82 (L) 4.20 - 5.40 M/uL    Hemoglobin 12.0 12.0 - 16.0 g/dL    Hematocrit 36.0 (L) 37.0 - 47.0 %    MCV 94.2 81.0 - 99.0 fL    MCH 31.4 (H) 27.0 - 31.0 pg    MCHC 33.3 33.0 - 37.0 g/dL    RDW 13.9 11.5 - 14.5 %    Platelets 713 976 - 862 K/uL    MPV 11.0 9.4 - 12.3 fL   POCT Glucose    Collection Time: 06/04/21  7:36 AM Result Value Ref Range    POC Glucose 140 (H) 70 - 99 mg/dl    Performed on AccuChek        Diet:  Diet NPO Exceptions are: Sips with Meds    Examination:  Vitals: BP (!) 153/63   Pulse 102   Temp 97.1 °F (36.2 °C) (Temporal)   Resp 17   Ht 5' 5\" (1.651 m)   Wt 223 lb 14.4 oz (101.6 kg)   SpO2 97%   BMI 37.26 kg/m²      Intake/Output Summary (Last 24 hours) at 6/4/2021 1116  Last data filed at 6/4/2021 0816  Gross per 24 hour   Intake 1890 ml   Output 1610 ml   Net 280 ml     General appearance: She is obtunded and moaning in the ICU. HEENT: Exam; heent: Head: Normal, normocephalic, atraumatic. Lungs: clear to auscultation bilaterally  Heart: regular rate and rhythm, S1, S2 normal, no murmur, click, rub or gallop  Abdomen: soft, non-tender; bowel sounds normal; no masses,  no organomegaly  Extremities: extremities normal, atraumatic, no cyanosis or edema  Neurologic:  Obtunded. Moaning intermittently. I was able to get her to follow simple commands and say a sentence or 2. No focal weakness noted in the upper extremities. Assessment:   1. Suspect left hemisphere cerebral infarct. CT negative. Not sure if it is safe to MRI this soon after PFO closure device implanted. EEG showing mild slowing only. 2.         Recent right hemisphere strokes with history of left and right hemisphere strokes convincing for cardioembolism. The P2Y12 plt inh test indicates she is a Plavix nonresponder so after PFO closure, I'd recommend Brillinta. 2.         PFO with septal aneurysm  3.         Hypertension  4.         Diabetes    Plan:   1. Repeat CT of the head in a day or 2. MRI may not be compatible with device. One-time Ativan dose given today. May be having withdrawal from narcotics and/or Lyrica. Discussed with nurse  2. PT/OT/ST  3. ASA/Brillinta/statin when able to take PO  Bolus of Depakote given. Discharge planning:    To be determined        Electronically signed by Juan C Villasenor MD on 6/4/2021 at 11:16 AM

## 2021-06-04 NOTE — CONSULTS
**Physician Signature**  This document was electronically signed by: Isabelle Ahuja MD  2021   05:42 PM    **Consult Information**  Member Facility: [address] [place] [address] Drive MRN: [id number]  Visit/Encounter Number: [id number]  Consult ID: [id number]  Facility Time Zone: CT  Date and Time of Request: 2021 07:02 AM  Requesting Clinician: DR. Ed Freeman  Patient Name: Larissa December  YOB: 1955  Gender: Female  Patient identity was confirmed at the beginning of the consult with the   patient/family/staff using two personal identifiers: Patient name and       **Admission**  Admission Date: 2021  Chief reason for ICU admission: Altered Mental Status  Other reason for ICU admission: CVA, Obstipation. **Core Metrics**  General orienting sentence for patient: 65F with hx of CVA. Had repair of   PFO . Post-op developed a hematoma in RIGHT groin. Received fentanyl   for pain and became obtunded, therefore sent to ICU. Now alert. KUB of the   abdomen showed FOS, now getting MOM, Dulcolax-->BM x 1. Less   responsive, CT negative. EEG pending. Central line placed b/o poor IV   access. BP high normal. a new CVA. moans.   Chief physiologic deterioration: None - Stable patient  Is the patient on DVT prophylaxis?: Yes  Prophylaxis type: Mechanical  DVT Prophylaxis Comments: SCDs  Is the patient on GI prophylaxis?: Not Indicated  Has this patient reached their nutritional goal?: No and issues are not being   addressed  Nutritional Goals Details: NPO  Are there current issues with pain management in this patient?:   No  Are there issues with skin integrity?: No  Are there issues with delirium?: Yes and issues are being addressed  Has the patient been mobilized?: No  Is this patient currently intubated?: No  Are there ethical or care philosophy or family issues?: No  Do you recommend an in depth evaluation?: Yes, facility agreed  Chief Reason for In Depth Evaluation: Altered Mental Status  Do you recommend the patient should: : Continue ICU level of care    **Inserted/Removed Devices**  Device Name: Lutz Catheter  Site of insertion: Bladder  Date of insertion: 06-  Device Name: Central venous catheter  Site of insertion: Interjugular - Right  Date of insertion: 06-

## 2021-06-04 NOTE — CONSULTS
**Physician Signature**  This document was electronically signed by: Guzman Hernandez DO  2021   11:11 PM    **Consult Information**  Member Facility: [address] [place] [address] MRN: [id number]  Visit/Encounter Number: [id number]  Consult ID: [id number]  Facility Time Zone: CT  Date and Time of Request: 2021 05:16 PM  Requesting Clinician: Ericka Mayberry MD  Patient Name: Ike Gómez  YOB: 1955  Gender: Female  Patient identity was confirmed at the beginning of the consult with the   patient/family/staff using two personal identifiers: Patient name and       **Admission**  Admission Date: 2021  Chief reason for ICU admission: Altered Mental Status  Other reason for ICU admission: CVA, Obstipation. **Core Metrics**  General orienting sentence for patient: 65F with hx of CVA. Had repair of   PFO . Post-op developed a hematoma in RIGHT groin. Received fentanyl   for pain and became obtunded, therefore sent to ICU. Now alert. KUB of the   abdomen showed FOS, now getting MOM, Dulcolax-->BM x 1. Less   responsive, CT negative. EEG pending. Central line placed b/o poor IV   access. BP high normal. a new CVA.   Chief physiologic deterioration: Change in mental status  Is the patient on DVT prophylaxis?: Yes  Prophylaxis type: Mechanical  DVT Prophylaxis Comments: SCDs  Is the patient on GI prophylaxis?: Not Indicated  Has this patient reached their nutritional goal?: No and issues are not being   addressed  Nutritional Goals Details: NPO  Are there current issues with pain management in this patient?:   No  Are there issues with skin integrity?: No  Are there issues with delirium?: Yes and issues are being addressed  Delirium Comments: Workup in progress  Has the patient been mobilized?: No  Is this patient currently intubated?: No  Are there ethical or care philosophy or family issues?: No  Do you recommend an in depth evaluation?: No  Do you recommend the patient should: : Continue ICU level of care    **Inserted/Removed Devices**  Device Name: Lutz Catheter  Site of insertion: Bladder  Date of insertion: 06-  Device Name: Central venous catheter  Site of insertion: Interjugular - Right  Date of insertion: 06-

## 2021-06-04 NOTE — PLAN OF CARE
Nutrition Problem #1: Inadequate oral intake  Intervention: Food and/or Nutrient Delivery: Continue NPO  Nutritional Goals: Pt will progress to an oral diet to meet nutritional needs

## 2021-06-04 NOTE — CARE COORDINATION
SW continuing to follow for d/c planning needs; Pt discussed yesterday and today with Giovani on 8th.   Electronically signed by NATALIE Martínez on 6/4/2021 at 2:58 PM

## 2021-06-04 NOTE — PLAN OF CARE
Problem: Falls - Risk of:  Goal: Will remain free from falls  Description: Will remain free from falls  6/4/2021 1846 by Feliz Quiles RN  Outcome: Ongoing  6/4/2021 0947 by Feliz Quiles RN  Outcome: Ongoing  Goal: Absence of physical injury  Description: Absence of physical injury  6/4/2021 1846 by Feliz Quiles RN  Outcome: Ongoing  6/4/2021 0947 by Feliz Quiles RN  Outcome: Ongoing     Problem: Skin Integrity:  Goal: Will show no infection signs and symptoms  Description: Will show no infection signs and symptoms  6/4/2021 1846 by Feliz Quiles RN  Outcome: Ongoing  6/4/2021 0947 by Feliz Quiles RN  Outcome: Ongoing  Goal: Absence of new skin breakdown  Description: Absence of new skin breakdown  6/4/2021 1846 by Feliz Quiles RN  Outcome: Ongoing  6/4/2021 0947 by Feliz Quiles RN  Outcome: Ongoing     Problem: HEMODYNAMIC STATUS  Goal: Patient has stable vital signs and fluid balance  6/4/2021 1846 by Feliz Quiles RN  Outcome: Ongoing  6/4/2021 0947 by Feliz Quiles RN  Outcome: Ongoing     Problem: ACTIVITY INTOLERANCE/IMPAIRED MOBILITY  Goal: Mobility/activity is maintained at optimum level for patient  6/4/2021 1846 by Feliz Quiles RN  Outcome: Ongoing  6/4/2021 0947 by Feliz Quiles RN  Outcome: Ongoing     Problem: COMMUNICATION IMPAIRMENT  Goal: Ability to express needs and understand communication  6/4/2021 1846 by Feliz Quiles RN  Outcome: Ongoing  6/4/2021 0947 by Feliz Quiles RN  Outcome: Ongoing     Problem: Urinary Elimination:  Goal: Signs and symptoms of infection will decrease  Description: Signs and symptoms of infection will decrease  6/4/2021 1846 by Feliz Quiles RN  Outcome: Ongoing  6/4/2021 0947 by Feliz Quiles RN  Outcome: Ongoing  Goal: Complications related to the disease process, condition or treatment will be avoided or minimized  Description: Complications related to the disease process, condition or treatment will be avoided or minimized  6/4/2021 1846 by Feliz Quiles RN  Outcome: Ongoing  6/4/2021 0947 by Yadi Gallardo RN  Outcome: Ongoing     Problem: Nutrition  Goal: Optimal nutrition therapy  Outcome: Ongoing

## 2021-06-04 NOTE — PROGRESS NOTES
Comprehensive Nutrition Assessment    Type and Reason for Visit:  Initial, RD Nutrition Re-Screen/LOS    Nutrition Assessment:  Pt is nutritionally compromised d/t NPO. Will monitor nutrition progression and implement nutrition intervention as needed. Malnutrition Assessment:  Malnutrition Status:   At risk for malnutrition (Comment)    Context:  Acute Illness     Findings of the 6 clinical characteristics of malnutrition:  Energy Intake:  Mild decrease in energy intake (Comment)  Weight Loss:  No significant weight loss     Body Fat Loss:  No significant body fat loss     Muscle Mass Loss:  No significant muscle mass loss    Fluid Accumulation:  No significant fluid accumulation     Strength:  Not Performed    Estimated Daily Nutrient Needs:  Energy (kcal):  0754-5560 kcals/day; Weight Used for Energy Requirements:  Current (11-14)     Protein (g):  114 g/PRO/day; Weight Used for Protein Requirements:  Ideal (2.0)        Fluid (ml/day):  6277-1013 mL/day; Method Used for Fluid Requirements:  1 ml/kcal       Current Nutrition Therapies:    Diet NPO Exceptions are: Sips with Meds    Anthropometric Measures:  · Height: 5' 5\" (165.1 cm)  · Current Body Weight: 223 lb (101.2 kg)   · Ideal Body Weight: 125 lbs  · BMI: 37.1  · BMI Categories: Obese Class 2 (BMI 35.0 -39.9)       Nutrition Diagnosis:   · Inadequate oral intake related to acute injury/trauma as evidenced by NPO or clear liquid status due to medical condition    Nutrition Interventions:   Food and/or Nutrient Delivery:  Continue NPO  Coordination of Nutrition Care:  Continue to monitor while inpatient    Goals:  Pt will progress to an oral diet to meet nutritional needs       Nutrition Monitoring and Evaluation:   Food/Nutrient Intake Outcomes:  Diet Advancement/Tolerance  Physical Signs/Symptoms Outcomes:  Biochemical Data, Nutrition Focused Physical Findings, Weight     Electronically signed by Theresa Mckinney MS, RD, LD on 6/4/21 at 10:29 AM CDT    Contact: [phone]

## 2021-06-05 NOTE — PLAN OF CARE
Problem: Falls - Risk of:  Goal: Will remain free from falls  Description: Will remain free from falls  6/5/2021 0507 by Debbie Silverio RN  Outcome: Ongoing  6/4/2021 1846 by Michael Turner RN  Outcome: Ongoing  Goal: Absence of physical injury  Description: Absence of physical injury  6/5/2021 0507 by Debbie Silverio RN  Outcome: Ongoing  6/4/2021 1846 by Michael Turner RN  Outcome: Ongoing     Problem: Skin Integrity:  Goal: Will show no infection signs and symptoms  Description: Will show no infection signs and symptoms  6/5/2021 0507 by Debbie Silverio RN  Outcome: Ongoing  6/4/2021 1846 by Michael Turner RN  Outcome: Ongoing  Goal: Absence of new skin breakdown  Description: Absence of new skin breakdown  6/5/2021 0507 by Debbie Silverio RN  Outcome: Ongoing  6/4/2021 1846 by Michael Turner RN  Outcome: Ongoing     Problem: HEMODYNAMIC STATUS  Goal: Patient has stable vital signs and fluid balance  6/5/2021 0507 by Debbie Silverio RN  Outcome: Ongoing  6/4/2021 1846 by Michael Turner RN  Outcome: Ongoing     Problem: ACTIVITY INTOLERANCE/IMPAIRED MOBILITY  Goal: Mobility/activity is maintained at optimum level for patient  6/5/2021 0507 by Debbie Silverio RN  Outcome: Ongoing  6/4/2021 1846 by Michael Turner RN  Outcome: Ongoing     Problem: COMMUNICATION IMPAIRMENT  Goal: Ability to express needs and understand communication  6/5/2021 0507 by Debbie Silverio RN  Outcome: Ongoing  6/4/2021 1846 by Michael Turner RN  Outcome: Ongoing     Problem: Pain:  Goal: Pain level will decrease  Description: Pain level will decrease  6/5/2021 0507 by Debbie Silverio RN  Outcome: Ongoing  6/4/2021 1846 by Michael Turner RN  Outcome: Ongoing  Goal: Control of acute pain  Description: Control of acute pain  6/5/2021 0507 by Debbie Silverio RN  Outcome: Ongoing  6/4/2021 1846 by Michael Turner RN  Outcome: Ongoing  Goal: Control of chronic pain  Description: Control of chronic pain  6/5/2021 0507 by Debbie Silverio RN  Outcome: Ongoing  6/4/2021 1846 by Rachel Mckeon RN  Outcome: Ongoing     Problem: Infection - Central Venous Catheter-Associated Bloodstream Infection:  Goal: Will show no infection signs and symptoms  Description: Will show no infection signs and symptoms  6/5/2021 0507 by Mary Gardner RN  Outcome: Ongoing  6/4/2021 1846 by Rachel Mckeon RN  Outcome: Ongoing     Problem: Urinary Elimination:  Goal: Signs and symptoms of infection will decrease  Description: Signs and symptoms of infection will decrease  6/5/2021 0507 by Mary Gardner RN  Outcome: Ongoing  6/4/2021 1846 by Rachel Mckeon RN  Outcome: Ongoing  Goal: Complications related to the disease process, condition or treatment will be avoided or minimized  Description: Complications related to the disease process, condition or treatment will be avoided or minimized  6/5/2021 0507 by Mary Gardner RN  Outcome: Ongoing  6/4/2021 1846 by Rachel Mckeon RN  Outcome: Ongoing     Problem: Nutrition  Goal: Optimal nutrition therapy  6/5/2021 0507 by Mary Gardner RN  Outcome: Ongoing  6/4/2021 1846 by Rachel Mckeon RN  Outcome: Ongoing

## 2021-06-05 NOTE — CONSULTS
**Physician Signature**  This document was electronically signed by: Nelsy Lopez DO  2021   11:14 AM    **Consult Information**  Member Facility: [address] [place] [address] MRN: [id number]  Visit/Encounter Number: [id number]  Consult ID: [id number]  Facility Time Zone: CT  Date and Time of Request: 2021 05:58 AM  Requesting Clinician: Jose Alberto Garcias MD  Patient Name: Mckenzie Rowan  YOB: 1955  Gender: Female  Patient identity was confirmed at the beginning of the consult with the   patient/family/staff using two personal identifiers: Patient name and       **Admission**  Admission Date: 2021  Chief reason for ICU admission: Altered Mental Status  Other reason for ICU admission: CVA, Obstipation. **Core Metrics**  General orienting sentence for patient: 65F with hx of CVA. Had repair of   PFO . Post-op developed a hematoma in RIGHT groin. Received fentanyl   for pain and became obtunded, therefore sent to ICU. Now alert. KUB of the   abdomen showed FOS, now getting MOM, Dulcolax-->BM x 1. Less   responsive, CT negative. EEG pending. Central line placed b/o poor IV   access. BP high normal. a new CVA. pm- no change interacting. Chief physiologic deterioration: None - Stable patient  Is the patient on DVT prophylaxis?: Yes  Prophylaxis type: Mechanical  DVT Prophylaxis Comments: SCDs  Is the patient on GI prophylaxis?: Not Indicated  Has this patient reached their nutritional goal?: No and issues are being   addressed  Nutritional Goals Details: NPO.  tried NG, but unable to place. Caused   bleeding. Are there current issues with pain management in this patient?: Yes and   issues are not being addressed  Pain management notes: chronic pain but not currently getting home meds per   RN. Are there issues with skin integrity?: No  Are there issues with delirium?: Yes and issues are being addressed  Delirium Comments: moaning.   Has the patient been mobilized?: No  Is this patient currently intubated?: No  Are there ethical or care philosophy or family issues?: No  Do you recommend an in depth evaluation?: No  Do you recommend the patient should: : Be downgraded/transitioned out of   ICU    **Inserted/Removed Devices**  Device Name: Lutz Catheter  Site of insertion: Bladder  Date of insertion: 06-  Device Name: Central venous catheter  Site of insertion: Interjugular - Right  Date of insertion: 06-

## 2021-06-05 NOTE — PROGRESS NOTES
Hospitalist Progress Note    Patient:  Alba Garnica  YOB: 1955  Date of Service: 6/5/2021  MRN: [id number]   Acct: [de-identified]   Primary Care Physician: Marco Antonio Ferguson MD  Advance Directive: Full Code  Admit Date: 5/29/2021       Hospital Day: 7  Referring Provider: Suraj Dimas,     Patient Seen, Chart, Consults, Notes, Labs, Radiology studies reviewed. Subjective:  Alba Garnica is a 63 y.o. female  whom we are following for mental status change, encephalopathy, new acute ischemic right hemispheric stroke. No significant clinical change in her neurologic status overnight. She still will not engage in conversation. She lies in bed and just moans. Hemodynamics are otherwise stable.     Allergies:  Ibuprofen, Metformin, Hydrocodone, and Nsaids    Medicines:  Current Facility-Administered Medications   Medication Dose Route Frequency Provider Last Rate Last Admin    ticagrelor (BRILINTA) tablet 90 mg  90 mg Oral BID Daphne Salvador MD   90 mg at 06/04/21 1144    LORazepam (ATIVAN) injection 0.5 mg  0.5 mg Intravenous Q4H PRN Marquita Fletcher MD   0.5 mg at 06/05/21 1023    valproate (DEPACON) 500 mg in dextrose 5 % 100 mL IVPB  500 mg Intravenous Q8H Marquita Fletcher MD   Stopped at 06/05/21 7918    oxymetazoline (AFRIN) 0.05 % nasal spray 2 spray  2 spray Each Nostril BID Glorianne Delay, DO   2 spray at 06/05/21 0257    metoprolol (LOPRESSOR) injection 5 mg  5 mg Intravenous Q6H Glorianne Delay, DO   5 mg at 06/05/21 1151    hydrALAZINE (APRESOLINE) injection 5 mg  5 mg Intravenous Q4H PRN Tatiana Beltran MD   5 mg at 06/04/21 1706    0.9 % sodium chloride infusion   Intravenous Continuous Tatiana Beltran  mL/hr at 06/05/21 0340 New Bag at 06/05/21 0340    perflutren lipid microspheres (DEFINITY) injection 1.65 mg  1.5 mL Intravenous ONCE PRN Jake Hernández, APRN - CNP        sodium chloride flush 0.9 % injection 5-40 mL  5-40 mL Intravenous 2 times per day ALEXYS Gandhi - CNP   10 mL at 06/05/21 2754    sodium chloride flush 0.9 % injection 5-40 mL  5-40 mL Intravenous PRN ALEXYS Gandhi - CNP   10 mL at 06/05/21 1134    0.9 % sodium chloride infusion  25 mL Intravenous PRN ALEXYS Gandhi - CNP        acetaminophen (TYLENOL) tablet 650 mg  650 mg Oral Q4H PRN ALEXYS Gandhi - CNP        0.9 % sodium chloride infusion  25 mL Intravenous PRN Mellisa Zambrano MD        bisacodyl (DULCOLAX) suppository 10 mg  10 mg Rectal Daily Jina Naranjo MD   10 mg at 06/05/21 4513    magnesium hydroxide (MILK OF MAGNESIA) 400 MG/5ML suspension 30 mL  30 mL Oral Daily Jina Naranjo MD   30 mL at 06/04/21 1144    polyethylene glycol (GLYCOLAX) packet 17 g  17 g Oral Daily PRN Kirby Moreira MD        acetaminophen (TYLENOL) suppository 650 mg  650 mg Rectal Q6H PRN Kirby Moreira MD        potassium chloride (KLOR-CON M) extended release tablet 40 mEq  40 mEq Oral PRN Kirby Moreira MD        Or    potassium bicarb-citric acid (EFFER-K) effervescent tablet 40 mEq  40 mEq Oral PRN Kirby Moreira MD        Or    potassium chloride 10 mEq/100 mL IVPB (Peripheral Line)  10 mEq Intravenous PRN Kirby Moreira MD        potassium chloride 10 mEq/100 mL IVPB (Peripheral Line)  10 mEq Intravenous PRN Kirby Moreira MD        ondansetron (ZOFRAN) injection 4 mg  4 mg Intravenous Q6H PRN Kirby Moreira MD        aspirin chewable tablet 81 mg  81 mg Oral Daily Kirby Moreira MD   81 mg at 06/04/21 1144    insulin lispro (HUMALOG) injection vial 0-6 Units  0-6 Units Subcutaneous TID WC Kirby Moreira MD   1 Units at 06/04/21 1706    insulin lispro (HUMALOG) injection vial 0-3 Units  0-3 Units Subcutaneous Nightly Kirby Moreira MD   1 Units at 06/05/21 0822    glucose (GLUTOSE) 40 % oral gel 15 g  15 g Oral PRN Hamlet Mahmood MD Demetri        dextrose 50 % IV solution  12.5 g Intravenous PRN Glenis Shaffer MD        glucagon (rDNA) injection 1 mg  1 mg Intramuscular PRN Glenis Shaffer MD        dextrose 5 % solution  100 mL/hr Intravenous PRN Glenis Shaffer MD           Past Medical History:  Past Medical History:   Diagnosis Date    Cerebral artery occlusion with cerebral infarction ([facility]     COPD (chronic obstructive pulmonary disease) ([facility]     Diabetes mellitus ([facility]     Hypertension     Neuromuscular disorder ([facility]     Palliative care patient 2021       Past Surgical History:  Past Surgical History:   Procedure Laterality Date    ABDOMEN SURGERY      gastric resection    APPENDECTOMY      CARDIAC SURGERY       SECTION      x2    CHOLECYSTECTOMY      CORONARY ANGIOPLASTY      SPLENECTOMY         Family History  No family history on file. Social History  Social History     Socioeconomic History    Marital status:      Spouse name: Not on file    Number of children: Not on file    Years of education: Not on file    Highest education level: Not on file   Occupational History    Not on file   Tobacco Use    Smoking status: Former Smoker     Packs/day: 1.00     Years: 28.00     Pack years: 28.00     Types: Cigarettes     Quit date:      Years since quittin.4    Smokeless tobacco: Never Used   Vaping Use    Vaping Use: Never used   Substance and Sexual Activity    Alcohol use: Not Currently    Drug use: Never    Sexual activity: Not Currently   Other Topics Concern    Not on file   Social History Narrative    Not on file     Social Determinants of Health     Financial Resource Strain:     Difficulty of Paying Living Expenses:    Food Insecurity:     Worried About [address] [employer] in the Last Year:     [address] in the Last Year:    Transportation Needs:     Lack of Transportation (Medical):      Lack of Transportation (Non-Medical): Physical Activity:     Days of Exercise per Week:     Minutes of Exercise per Session:    Stress:     Feeling of Stress :    Social Connections:     Frequency of Communication with Friends and Family:     Frequency of Social Gatherings with Friends and Family:     Attends Anabaptism Services:     Active Member of Clubs or Organizations:     Attends Club or Organization Meetings:     Marital Status:    Intimate Partner Violence:     Fear of Current or Ex-Partner:     Emotionally Abused:     Physically Abused:     Sexually Abused:          Review of Systems:    Review of Systems   Unable to perform ROS: Mental status change           Objective:  Blood pressure (!) 167/89, pulse 91, temperature 96.9 °F (36.1 °C), temperature source Temporal, resp. rate 10, height 5' 5\" (1.651 m), weight 222 lb 4.8 oz (100.8 kg), SpO2 99 %. Intake/Output Summary (Last 24 hours) at 6/5/2021 1228  Last data filed at 6/5/2021 1000  Gross per 24 hour   Intake 2621.67 ml   Output 2410 ml   Net 211.67 ml       Physical Exam  Vitals and nursing note reviewed. Constitutional:       Appearance: She is ill-appearing. HENT:      Head: Normocephalic and atraumatic. Right Ear: External ear normal.      Left Ear: External ear normal.      Nose: Nose normal.      Mouth/Throat:      Mouth: Mucous membranes are moist.   Eyes:      Conjunctiva/sclera: Conjunctivae normal.      Pupils: Pupils are equal, round, and reactive to light. Cardiovascular:      Rate and Rhythm: Normal rate and regular rhythm. Heart sounds: Normal heart sounds. Pulmonary:      Effort: Pulmonary effort is normal.      Breath sounds: Normal breath sounds. Abdominal:      General: Abdomen is flat. Palpations: Abdomen is soft. Musculoskeletal:         General: Normal range of motion. Cervical back: Neck supple. No rigidity. No muscular tenderness. Skin:     General: Skin is warm and dry.          Labs:  BMP:   Recent Labs 06/03/21  1159 06/03/21  1507 06/04/21  0428 06/05/21  0415     --  138 141   K 4.4 4.1 3.9 3.9     --  105 106   CO2 24  --  23 24   BUN 20  --  20 21   CREATININE 1.3*  --  1.1* 1.1*   CALCIUM 9.2  --  9.1 9.2     CBC:   Recent Labs     06/03/21  1159 06/04/21  0428 06/05/21  0415   WBC 24.5* 21.5* 21.6*   HGB 12.8 12.0 11.3*   HCT 38.0 36.0* 34.5*   MCV 94.8 94.2 96.4    326 283     LIVER PROFILE: No results for input(s): AST, ALT, LIPASE, BILIDIR, BILITOT, ALKPHOS in the last 72 hours. Invalid input(s): AMYLASE,  ALB  PT/INR: No results for input(s): PROTIME, INR in the last 72 hours. APTT: No results for input(s): APTT in the last 72 hours. BNP:  No results for input(s): BNP in the last 72 hours. Ionized Calcium:No results for input(s): IONCA in the last 72 hours. Magnesium:No results for input(s): MG in the last 72 hours. Phosphorus:No results for input(s): PHOS in the last 72 hours. HgbA1C: No results for input(s): LABA1C in the last 72 hours. Hepatic: No results for input(s): ALKPHOS, ALT, AST, PROT, BILITOT, BILIDIR, LABALBU in the last 72 hours. Lactic Acid: No results for input(s): LACTA in the last 72 hours. Troponin: No results for input(s): CKTOTAL, CKMB, TROPONINT in the last 72 hours. ABGs: No results for input(s): PH, PCO2, PO2, HCO3, O2SAT in the last 72 hours. CRP:  No results for input(s): CRP in the last 72 hours. Sed Rate:  No results for input(s): SEDRATE in the last 72 hours. Cultures:   No results for input(s): CULTURE in the last 72 hours. No results for input(s): BC, Clint Puff in the last 72 hours. No results for input(s): CXSURG in the last 72 hours. Radiology reports as per the Radiologist  Radiology: XR CHEST PORTABLE    Result Date: 5/30/2021  Exam: XR CHEST PORTABLE - 5/30/2021 10:34 AM Indication: Leukocytosis Comparison: None available. Findings: Cardiomediastinal silhouette is within normal limits.  No pleural effusion, pneumothorax, or focal consolidation. No acute osseous findings. Surgical clips in the LEFT upper abdomen. No acute findings. Signed by Dr Danelle Gallardo on 5/30/2021 11:54 AM    VL DUP CAROTID BILATERAL    Result Date: 5/31/2021  Vascular Carotid Procedure  Demographics   Patient Name    Amada Akins Age                   63   Patient Number  [id number]       Gender                Female   Visit Number    [id number]    Interpreting          Ila Lai MD                               Physician   Date of Birth   1955   Referring Physician   Axel Gomez   Accession       [id number]   [address] [place], [address]  Number  Procedure Type of Study:   Cerebral:Carotid, VL CAROTID BILATERAL. Indications for Study:Stroke. Risk Factors   - The patient's risk factor(s) include: diabetes mellitus and arterial     hypertension.   - The patient has a former tobacco history. Impression   Duplex sonography with color flow enhancement was performed bilaterally on  the cervical carotid system. No evidence of hemodynamically significant  stenosis in the bilateral carotid and vertebral arteries. Signature   ----------------------------------------------------------------  Electronically signed by Ila Lai MD(Interpreting  physician) on 05/31/2021 06:32 AM  ----------------------------------------------------------------  Blood Pressure:Right arm 136/82 mmHg. Left arm 134/80 mmHg. Velocities are measured in cm/s ; Diameters are measured in mm Carotid Right Measurements +------------+-------+-------+--------+-------+------------+---------------+ ! Location    ! PSV    ! EDV    ! Angle   ! RI     !%Stenosis   ! Tortuosity     ! +------------+-------+-------+--------+-------+------------+---------------+ ! Prox CCA    !88     !27.5   !60      !0.69   !            !               ! +------------+-------+-------+--------+-------+------------+---------------+ ! Mid CCA     !77     !25.1   ! 60      !0.67   !            ! ! +------------+-------+-------+--------+-------+------------+---------------+ ! Prox ICA    !63.6   !23.6   ! 60      !0.63   !            !               ! +------------+-------+-------+--------+-------+------------+---------------+ ! Mid ICA     !72.3   !30.6   ! 60      !0.58   !            !               ! +------------+-------+-------+--------+-------+------------+---------------+ ! Dist ICA    !83.3   !25.9   !60      !0.69   !            !               ! +------------+-------+-------+--------+-------+------------+---------------+ ! Prox ECA    !67.6   !14.9   !60      !0.78   !            !               ! +------------+-------+-------+--------+-------+------------+---------------+ ! Vertebral   !65.3   !19.7   !56      !0.7    ! !               ! +------------+-------+-------+--------+-------+------------+---------------+   - There is antegrade vertebral flow noted on the right side. - Additional Measurements:ICAPSV/CCAPSV 0.95. ICAEDV/CCAEDV 1.11. Carotid Left Measurements +------------+-------+-------+--------+-------+------------+---------------+ ! Location    ! PSV    ! EDV    ! Angle   ! RI     !%Stenosis   ! Tortuosity     ! +------------+-------+-------+--------+-------+------------+---------------+ ! Prox CCA    !109    !26.7   ! 60      !0.76   !            !               ! +------------+-------+-------+--------+-------+------------+---------------+ ! Mid CCA     !77     !25.9   !60      !0.66   !            !               ! +------------+-------+-------+--------+-------+------------+---------------+ ! Prox ICA    !53.4   !20.8   !60      !0.61   !            !               ! +------------+-------+-------+--------+-------+------------+---------------+ ! Mid ICA     ! 85     !27.6   ! 60      !0.68   !            !               ! +------------+-------+-------+--------+-------+------------+---------------+ ! Dist ICA    !56.3   !23.5   !60      !0.58   !            !               ! +------------+-------+-------+--------+-------+------------+---------------+ ! Prox ECA    !90.4   !18.9   !60      !0.79   !            !               ! +------------+-------+-------+--------+-------+------------+---------------+ ! Vertebral   !46     !17.7   ! 60      !0.62   !            !               ! +------------+-------+-------+--------+-------+------------+---------------+   - There is antegrade vertebral flow noted on the left side. - Additional Measurements:ICAPSV/CCAPSV 0.78. ICAEDV/CCAEDV 1.03. MRI BRAIN WO CONTRAST    Result Date: 5/29/2021  Exam: MRI BRAIN WO CONTRAST - 5/29/2021 11:31 AM Indication: Stroke like symptoms Comparison: None available. Findings: Small acute infarcts in the RIGHT periventricular white matter and RIGHT putamen. There is some matching T2 FLAIR hyperintense signal. No increased susceptibility to suggest acute or chronic hemorrhage. Major physiologic flow voids are maintained. RIGHT frontal, LEFT parietal, and RIGHT occipital areas of encephalomalacia likely representing old infarcts. Chronic microvascular ischemic white matter change. Global cerebral volume loss. No midline shift or mass effect. Lateral ventricles are nondilated. Basilar cisterns are patent. Sella turcica and its contents appear unremarkable. No acute orbital finding. Mastoid air cells and paranasal sinuses are clear. 1.  Small acute infarcts in the RIGHT periventricular white matter and RIGHT basal ganglia. 2.  Multiple bilateral remote infarcts.  Signed by Dr Guillermina Mendes on 5/29/2021 2:43 PM    ECHO 2D WO COLOR DOPPLER COMPLETE    Result Date: 5/29/2021  Transthoracic Echocardiography Report (TTE)  Demographics   Patient Name  Janeen Farrell Date of Study         05/29/2021   MRN           [id number]       Gender                Female   Date of Birth 1955   Room Number           [id number]   Age           63 year(s)   Height:       65 inches    Referring Physician   Zaida Diego MD   Weight: 220 pounds   Sonographer           Loly Beck ENRIKE   BSA:          2.06 m^2     Interpreting          José Antonio Jacobsen MD                             Physician   BMI:          36.61 kg/m^2  Procedure Type of Study   TTE procedure:ECHOCARDIOGRAM COMPLETE 2D WO COLOR DOPPLER. Study Location: Echo Lab Technical Quality: Adequate visualization Patient Status: Inpatient Contrast Medium: Bubble Study. Indications:Patent foramen ovale (PFO). Conclusions   Summary  Normal size left atrium. positive bubble study  Normal right atrial dimension with no evidence of thrombus or mass noted. Normal right ventricular size with preserved RV function. Normal right ventricular size with preserved RV function. pfo present  Normal right ventricular size with preserved RV function. pfo present. large shunt. aneurysmal ias. No evidence of significant pericardial effusion is noted. No evidence of pleural effusion. Structurally normal mitral valve with normal leaflet mobility. No evidence  of mitral valve stenosis or mild mitral regurgitation. Aortic valve appears to be tricuspid. Structurally normal aortic valve. No significant aortic regurgitation or stenosis is noted. Tricuspid valve is structurally normal.  No evidence of tricuspid regurgitation. Signature   ----------------------------------------------------------------  Electronically signed by José Antonio Jacobsen MD(Interpreting  physician) on 05/29/2021 03:38 PM  ----------------------------------------------------------------   Findings   Mitral Valve  Structurally normal mitral valve with normal leaflet mobility. No evidence  of mitral valve stenosis or mild mitral regurgitation. Aortic Valve  Aortic valve appears to be tricuspid. Structurally normal aortic valve. No significant aortic regurgitation or stenosis is noted. Tricuspid Valve  Tricuspid valve is structurally normal.  No evidence of tricuspid regurgitation.    Left Atrium  Normal size left atrium. positive bubble study   Left Ventricle  Normal left ventricular size with preserved LV function and an estimated  ejection fraction of approximately 55-60%. No evidence of left ventricular mass or thrombus noted. Right Atrium  Normal right atrial dimension with no evidence of thrombus or mass noted. Right Ventricle  Normal right ventricular size with preserved RV function. pfo present. large shunt. aneurysmal ias. Pericardial Effusion  No evidence of significant pericardial effusion is noted. Pleural Effusion  No evidence of pleural effusion. M-Mode Measurements (cm)   LVIDd: 4.45 cm                       LVIDs: 3.29 cm  IVSd: 1.01 cm  LVPWd: 1.02 cm                       AO Root Dimension: 3.1 cm  % Ejection Fraction: 51 %            LA: 3 cm                                       LVOT: 1.9 cm  Doppler Measurements:   AV Peak Velocity:113 cm/s           MV Peak E-Wave: 50.4 cm/s  AV Peak Gradient: 5.11 mmHg         MV Peak A-Wave: 61.4 cm/s                                      MV E/A Ratio: 0.82 %                                      MV Peak Gradient: 1.02 mmHg         Assessment     Right hemisphere, cerebral infarction ischemic. Continue supportive care.     Status post PFO closure. Continue supportive care.     Persistent mental status change. Multifactorial.  Supportive care. Neurology following.     Transfer out of ICU.     Please document 37 minutes of critical care time for patient assessment, chart review, discussion with staff, .  126 Urvashi Jenkins DO

## 2021-06-05 NOTE — PROGRESS NOTES
[address], [address]  [place], [place] [address]  Phone [phone]     Neurology Progress Note  2021 10:44 AM  Information:   Patient Name: Con Patel  :   1955  Age:   63 y.o. MRN:   [id number]  Account #:  [de-identified]  Admit Date:   2021  Today:  6/3/21     ADMIT DX:   Right hemisphere, cerebral infarction [facility])    Subjective:     Desi Aguillon a 63y.o. year old woman with a history of hypertension, hyperlipidemia, diabetes, cardiovascular disease, and previous strokes who was transferred from [facility] ER  after having yet another stroke.      Interval History:   She had PFO closure . She developed abdominal pain, urine retention, and right groin hematoma after but was able to move and talk. At 8pm, she was noted to have diminished responsiveness but did have IV opioid. She was transferred to the ICU. Moaning off and on most of the day yesterday. She did respond somewhat better after 2 mg of Ativan given. Depacon given as well. MRI showing no new strokes. Objective:     Past Medical History:  Past Medical History:   Diagnosis Date    [facility]erebral artery occlusion [facility] infarction ([facility]     COPD (chronic obstructive pulmonary disease) (HCC)     Diabetes mellitus ([facility]     Hypertension     Neuromuscular disorder ([facility]     Palliative care patient 2021       Past Surgical History:   Procedure Laterality Date    ABDOMEN SURGERY      gastric resection    APPENDECTOMY      CARDIAC SURGERY       SECTION      x2    CHOLECYSTECTOMY      CORONARY ANGIOPLASTY      SPLENECTOMY         No family history on file. Social History     Socioeconomic History    Marital status:       Spouse name: Not on file    Number of children: Not on file    Years of education: Not on file    Highest education level: Not on file   Occupational History    Not on file   Tobacco Use    Smoking status: Former Smoker     Packs/day: 1.00     Years: 28.00     Pack years: 28.00     Types: Cigarettes     Quit date:      Years since quittin.4    Smokeless tobacco: Never Used   Vaping Use    Vaping Use: Never used   Substance and Sexual Activity    Alcohol use: Not Currently    Drug use: Never    Sexual activity: Not Currently   Other Topics Concern    Not on file   Social History Narrative    Not on file     Social Determinants of Health     Financial Resource Strain:     Difficulty of Paying Living Expenses:    Food Insecurity:     Worried About Running Out of Food in the Last Year:     [address] in the Last Year:    Transportation Needs:     Lack of Transportation (Medical):      Lack of Transportation (Non-Medical):    Physical Activity:     Days of Exercise per Week:     Minutes of Exercise per Session:    Stress:     Feeling of Stress :    Social Connections:     Frequency of Communication with Friends and Family:     Frequency of Social Gatherings with Friends and Family:     Attends Hoahaoism Services:     Active Member of Clubs or Organizations:     Attends Club or Organization Meetings:     Marital Status:    Intimate Partner Violence:     Fear of Current or Ex-Partner:     Emotionally Abused:     Physically Abused:     Sexually Abused:        Medications:   sodium chloride 100 mL/hr at 21 0340    sodium chloride      sodium chloride      dextrose        ziprasidone  20 mg Intramuscular Once    ticagrelor  90 mg Oral BID    valproate sodium (DEPACON) IVPB  500 mg Intravenous Q8H    oxymetazoline  2 spray Each Nostril BID    metoprolol  5 mg Intravenous Q6H    sodium chloride flush  5-40 mL Intravenous 2 times per day    bisacodyl  10 mg Rectal Daily    magnesium hydroxide  30 mL Oral Daily    aspirin  81 mg Oral Daily    insulin lispro  0-6 Units Subcutaneous TID WC    insulin lispro  0-3 Units Subcutaneous Nightly       Diagnostic Studies:  Reviewed all labs/diagnositcs since last 24hrs:  Recent Results (from the past 24 hour(s))   POCT Glucose    Collection Time: 06/04/21 11:53 AM   Result Value Ref Range    POC Glucose 170 (H) 70 - 99 mg/dl    Performed on AccuChek    POCT Glucose    Collection Time: 06/04/21  5:04 PM   Result Value Ref Range    POC Glucose 202 (H) 70 - 99 mg/dl    Performed on AccuChek    POCT Glucose    Collection Time: 06/04/21  8:21 PM   Result Value Ref Range    POC Glucose 206 (H) 70 - 99 mg/dl    Performed on AccuChek    Basic Metabolic Panel w/ Reflex to MG    Collection Time: 06/05/21  4:15 AM   Result Value Ref Range    Sodium 141 136 - 145 mmol/L    Potassium reflex Magnesium 3.9 3.5 - 5.0 mmol/L    Chloride 106 98 - 111 mmol/L    CO2 24 22 - 29 mmol/L    Anion Gap 11 7 - 19 mmol/L    Glucose 179 (H) 74 - 109 mg/dL    BUN 21 8 - 23 mg/dL    CREATININE 1.1 (H) 0.5 - 0.9 mg/dL    GFR Non-African American 50 (A) >60    GFR African American >59 >59    Calcium 9.2 8.8 - 10.2 mg/dL   CBC    Collection Time: 06/05/21  4:15 AM   Result Value Ref Range    WBC 21.6 (H) 4.8 - 10.8 K/uL    RBC 3.58 (L) 4.20 - 5.40 M/uL    Hemoglobin 11.3 (L) 12.0 - 16.0 g/dL    Hematocrit 34.5 (L) 37.0 - 47.0 %    MCV 96.4 81.0 - 99.0 fL    MCH 31.6 (H) 27.0 - 31.0 pg    MCHC 32.8 (L) 33.0 - 37.0 g/dL    RDW 14.0 11.5 - 14.5 %    Platelets 377 264 - 002 K/uL    MPV 11.0 9.4 - 12.3 fL   POCT Glucose    Collection Time: 06/05/21  8:10 AM   Result Value Ref Range    POC Glucose 158 (H) 70 - 99 mg/dl    Performed on AccuChek        Diet:  Diet NPO Exceptions are: Sips with Meds    Examination:  Vitals: /82   Pulse 96   Temp 96.9 °F (36.1 °C) (Temporal)   Resp 15   Ht 5' 5\" (1.651 m)   Wt 222 lb 4.8 oz (100.8 kg)   SpO2 99%   BMI 36.99 kg/m²      Intake/Output Summary (Last 24 hours) at 6/5/2021 1044  Last data filed at 6/5/2021 1000  Gross per 24 hour   Intake 2821.67 ml   Output 2710 ml   Net 111.67 ml     General appearance: She is obtunded and moaning in the ICU.   HEENT: Exam; heent: Head: Normal, normocephalic, atraumatic. Lungs: clear to auscultation bilaterally  Heart: regular rate and rhythm, S1, S2 normal, no murmur, click, rub or gallop  Abdomen: soft, non-tender; bowel sounds normal; no masses,  no organomegaly  Extremities: extremities normal, atraumatic, no cyanosis or edema  Neurologic: Moaning. Occasionally follows a simple command. Able to speak with nurse yesterday for a short while. EXAMINATION:  MRI BRAIN WO CONTRAST  6/4/2021 3:53 PM   HISTORY: Decreasing responsiveness. Clinical concerns for CVA   COMPARISON: MR imaging dated 5/29/2021 and head CT imaging dated   6/3/2021   TECHNIQUE: Multiplanar MR imaging the brain was performed. FINDINGS:    There is image quality degradation with large amount patient motion   artifact. There is small amount of residual restricted diffusion signal   identified in the right periventricular white matter near the   body/atrium of the lateral ventricle and in the putamen, near the   external capsule is noted on the previous MR. There are no new or developing areas of restricted diffusion, acute   ischemic change/infarction. There is corresponding FLAIR signal hyperintensities there is a   previously described infarct ischemic changes. There is FLAIR signal hyperintensities in the periventricular   subcortical white matter. There is no convincing evidence of abnormal intra-axial extra-axial   blood products. .   There is no hydrocephalus. There is central and cortical atrophy.       Impression   1. Image quality degradation related to large amount of patient motion   artifact. 2. Mild residual restricted diffusion in areas of previously described   infarct in the right periventricular and basal ganglia/putamen. No   developing or acute areas of acute ischemic change/infarction. Signed by Dr Laural Boxer on 6/4/2021 3:02 PM       Assessment:   1. Altered mental status. No evidence of recurrent stroke by MRI.   Symptoms appear to be more consistent with drug withdrawal in my opinion. We will repeat EEG today. Continue Depakote and add Geodon x1. .  Considering low-dose fentanyl patch. 2.         Recent right hemisphere strokes with history of left and right hemisphere strokes convincing for cardioembolism. The P2Y12 plt inh test indicates she is a Plavix nonresponder so after PFO closure, I'd recommend Brillinta. 2.         PFO with septal aneurysm  3.         Hypertension  4.         Diabetes    Plan:   1. Geodon today. Repeat EEG discussed with nurse. Continue Depakote  2. PT/OT/ST  3. ASA/Brillinta/statin when able to take PO  n. Discharge planning:    To be determined        Electronically signed by Isa Mcguire MD on 6/5/2021 at 10:44 AM

## 2021-06-05 NOTE — PROGRESS NOTES
Stephen Mcginnis transferred to [facility] from [facility] via bed. Reason for transfer: stable for transfer to med-surg   Explained reason for transfer to Patient and Family. Belongings: none location is not applicable . Soft chart transferred with patient: Yes. Telemetry box number 025 transferred with patient: yes. Report given to: ROD Garcia via telephone.       Electronically signed by Miquel Montes RN on 6/5/2021 at 2:31 PM

## 2021-06-05 NOTE — CONSULTS
**Physician Signature**  This document was electronically signed by: Yolanda Sandoval MD  2021   11:39 PM    **Consult Information**  Member Facility: [address] [place] [address] Drive MRN: [id number]  Visit/Encounter Number: [id number]  Consult ID: [id number]  Facility Time Zone: CT  Date and Time of Request: 2021 06:07 PM  Requesting Clinician: Alisson Crow MD  Patient Name: Adi Donohue  YOB: 1955  Gender: Female  Patient identity was confirmed at the beginning of the consult with the   patient/family/staff using two personal identifiers: Patient name and       **Admission**  Admission Date: 2021  Chief reason for ICU admission: Altered Mental Status  Other reason for ICU admission: CVA, Obstipation. **Core Metrics**  General orienting sentence for patient: 65F with hx of CVA. Had repair of   PFO . Post-op developed a hematoma in RIGHT groin. Received fentanyl   for pain and became obtunded, therefore sent to ICU. Now alert. KUB of the   abdomen showed FOS, now getting MOM, Dulcolax-->BM x 1. Less   responsive, CT negative. EEG pending. Central line placed b/o poor IV   access. BP high normal. a new CVA.  pm- no change  Chief physiologic deterioration: None - Stable patient  Is the patient on DVT prophylaxis?: Yes  Prophylaxis type: Mechanical  DVT Prophylaxis Comments: SCDs  Is the patient on GI prophylaxis?: Not Indicated  Has this patient reached their nutritional goal?: No and issues are not being   addressed  Nutritional Goals Details: NPO  Are there current issues with pain management in this patient?:   No  Are there issues with skin integrity?: No  Are there issues with delirium?: Yes and issues are being addressed  Has the patient been mobilized?: No  Is this patient currently intubated?: No  Are there ethical or care philosophy or family issues?: No  Do you recommend an in depth evaluation?: No  Do you recommend the patient should: : Continue ICU level of care    **Inserted/Removed Devices**  Device Name: Lutz Catheter  Site of insertion: Bladder  Date of insertion: 06-  Device Name: Central venous catheter  Site of insertion: Interjugular - Right  Date of insertion: 06-

## 2021-06-06 NOTE — PROGRESS NOTES
Hospitalist Progress Note    Patient:  Joanie Lau  YOB: 1955  Date of Service: 6/6/2021  MRN: [id number]   Acct: [de-identified]   Primary Care Physician: Kayla Ramos MD  Advance Directive: Full Code  Admit Date: 5/29/2021       Hospital Day: 8  Referring Provider: Maia Mendoza DO    Patient Seen, Chart, Consults, Notes, Labs, Radiology studies reviewed. Subjective:  Joanie Lau is a 63 y.o. female  whom we are following for mental status change, encephalopathy, new acute ischemic right hemispheric stroke. She is currently getting an EEG. She is not as agitated as she was yesterday.     Allergies:  Ibuprofen, Metformin, Hydrocodone, and Nsaids    Medicines:  Current Facility-Administered Medications   Medication Dose Route Frequency Provider Last Rate Last Admin    valproate (DEPACON) 1,000 mg in dextrose 5 % 100 mL IVPB  1,000 mg Intravenous Q8H Juan C Villasenor MD   Stopped at 06/06/21 1205    lacosamide (VIMPAT) 100 mg in dextrose 5 % 60 mL IVPB  100 mg Intravenous BID Juan C Villasenor MD   Stopped at 06/06/21 1205    fentaNYL (DURAGESIC) 25 MCG/HR 1 patch  1 patch Transdermal Q72H Juan C Villasenor MD   1 patch at 06/06/21 1001    ticagrelor (BRILINTA) tablet 90 mg  90 mg Oral BID Shivani Camacho MD   90 mg at 06/04/21 1144    LORazepam (ATIVAN) injection 0.5 mg  0.5 mg Intravenous Q4H PRN Juan C Villasenor MD   0.5 mg at 06/06/21 0852    oxymetazoline (AFRIN) 0.05 % nasal spray 2 spray  2 spray Each Nostril BID Maia Mendoza DO   2 spray at 06/05/21 4994    metoprolol (LOPRESSOR) injection 5 mg  5 mg Intravenous Q6H Maia Mendoza DO   5 mg at 06/06/21 1203    hydrALAZINE (APRESOLINE) injection 5 mg  5 mg Intravenous Q4H PRN Yarely De La Cruz MD   5 mg at 06/04/21 1706    0.9 % sodium chloride infusion   Intravenous Continuous Yarely De La Cruz  mL/hr at 06/05/21 0340 New Bag at 06/05/21 0340    perflutren lipid microspheres (DEFINITY) injection 1.65 mg  1.5 mL Intravenous ONCE PRN Ondina Ny APRN - CNP        sodium chloride flush 0.9 % injection 5-40 mL  5-40 mL Intravenous 2 times per day Ondina Ny APRN - CNP   10 mL at 06/05/21 0823    sodium chloride flush 0.9 % injection 5-40 mL  5-40 mL Intravenous PRN Ondina Ny APRN - CNP   10 mL at 06/05/21 1134    0.9 % sodium chloride infusion  25 mL Intravenous PRN Ondina Yanely APRN - CNP        acetaminophen (TYLENOL) tablet 650 mg  650 mg Oral Q4H PRN Ondina Ny, APRN - CNP        bisacodyl (DULCOLAX) suppository 10 mg  10 mg Rectal Daily Warner Jaimes MD   10 mg at 06/05/21 1475    magnesium hydroxide (MILK OF MAGNESIA) 400 MG/5ML suspension 30 mL  30 mL Oral Daily Warner Jaimes MD   30 mL at 06/04/21 1144    polyethylene glycol (GLYCOLAX) packet 17 g  17 g Oral Daily PRN Ezekiel Nevarez MD        acetaminophen (TYLENOL) suppository 650 mg  650 mg Rectal Q6H PRN Ezekiel Nevarez MD        potassium chloride (KLOR-CON M) extended release tablet 40 mEq  40 mEq Oral PRN Ezekiel Nevarez MD        Or    potassium bicarb-citric acid (EFFER-K) effervescent tablet 40 mEq  40 mEq Oral PRN Ezekiel Nevarez MD        Or    potassium chloride 10 mEq/100 mL IVPB (Peripheral Line)  10 mEq Intravenous PRN Ezekiel Nevarez MD        potassium chloride 10 mEq/100 mL IVPB (Peripheral Line)  10 mEq Intravenous PRN Ezekiel Nevarez MD        ondansetron (ZOFRAN) injection 4 mg  4 mg Intravenous Q6H PRN Ezekiel Nevarez MD        aspirin chewable tablet 81 mg  81 mg Oral Daily Ezekiel Nevarez MD   81 mg at 06/04/21 1144    insulin lispro (HUMALOG) injection vial 0-6 Units  0-6 Units Subcutaneous TID WC Ezekiel Nevarez MD   1 Units at 06/04/21 1706    insulin lispro (HUMALOG) injection vial 0-3 Units  0-3 Units Subcutaneous Nightly Ezekiel Nevarez MD   1 Units at 06/05/21 8674  glucose (GLUTOSE) 40 % oral gel 15 g  15 g Oral PRN Sushma Robles MD        dextrose 50 % IV solution  12.5 g Intravenous PRN Sushma Robles MD        glucagon (rDNA) injection 1 mg  1 mg Intramuscular PRN Sushma Robles MD        dextrose 5 % solution  100 mL/hr Intravenous PRN Sushma Robles MD           Past Medical History:  Past Medical History:   Diagnosis Date    [facility]bral [facility] occlusion [facility] ([facility]     COPD (chronic obstructive pulmonary disease) ([facility]     Diabetes mellitus ([facility]     Hypertension     Neuromuscular disorder ([place] [address].)     Palliative care patient 2021       Past Surgical History:  Past Surgical History:   Procedure Laterality Date    ABDOMEN SURGERY      gastric resection    APPENDECTOMY      CARDIAC SURGERY       SECTION      x2    CHOLECYSTECTOMY      CORONARY ANGIOPLASTY      SPLENECTOMY         Family History  No family history on file. Social History  Social History     Socioeconomic History    Marital status:       Spouse name: Not on file    Number of children: Not on file    Years of education: Not on file    Highest education level: Not on file   Occupational History    Not on file   Tobacco Use    Smoking status: Former Smoker     Packs/day: 1.00     Years: 28.00     Pack years: 28.00     Types: Cigarettes     Quit date:      Years since quittin.4    Smokeless tobacco: Never Used   Vaping Use    Vaping Use: Never used   Substance and Sexual Activity    Alcohol use: Not Currently    Drug use: Never    Sexual activity: Not Currently   Other Topics Concern    Not on file   Social History Narrative    Not on file     Social Determinants of Health     Financial Resource Strain:     Difficulty of Paying Living Expenses:    Food Insecurity:     Worried About [address] in the Last Year:     [address] in the Last Year:    Transportation Needs:     Lack of Transportation (Medical):  Lack of Transportation (Non-Medical):    Physical Activity:     Days of Exercise per Week:     Minutes of Exercise per Session:    Stress:     Feeling of Stress :    Social Connections:     Frequency of Communication with Friends and Family:     Frequency of Social Gatherings with Friends and Family:     Attends [employer] Services:     Active Member of Clubs or Organizations:     Attends Club or Organization Meetings:     Marital Status:    Intimate Partner Violence:     Fear of Current or Ex-Partner:     Emotionally Abused:     Physically Abused:     Sexually Abused:          Review of Systems:    Review of Systems   Unable to perform ROS: Mental status change           Objective:  Blood pressure 134/81, pulse 70, temperature 98.7 °F (37.1 °C), temperature source Temporal, resp. rate 20, height 5' 5\" (1.651 m), weight 222 lb 4.8 oz (100.8 kg), SpO2 98 %. Intake/Output Summary (Last 24 hours) at 6/6/2021 1347  Last data filed at 6/6/2021 1206  Gross per 24 hour   Intake 1671 ml   Output 3150 ml   Net -1479 ml       Physical Exam  Vitals and nursing note reviewed. Constitutional:       Appearance: She is ill-appearing. HENT:      Head: Normocephalic and atraumatic. Right Ear: External ear normal.      Left Ear: External ear normal.      Nose: Nose normal.      Mouth/Throat:      Mouth: Mucous membranes are moist.   Eyes:      Conjunctiva/sclera: Conjunctivae normal.      Pupils: Pupils are equal, round, and reactive to light. Cardiovascular:      Rate and Rhythm: Normal rate and regular rhythm. Heart sounds: Normal heart sounds. Pulmonary:      Effort: Pulmonary effort is normal.      Breath sounds: Normal breath sounds. Abdominal:      General: Abdomen is flat. Palpations: Abdomen is soft. Musculoskeletal:         General: Normal range of motion. Cervical back: Neck supple. No rigidity. No muscular tenderness.    Skin:     General: Skin is warm and dry. Labs:  BMP:   Recent Labs     06/04/21  0428 06/05/21  0415 06/06/21  0530    141 141   K 3.9 3.9 3.6    106 107   CO2 23 24 26   BUN 20 21 17   CREATININE 1.1* 1.1* 0.9   CALCIUM 9.1 9.2 9.3     CBC:   Recent Labs     06/04/21 0428 06/05/21  0415 06/06/21  0530   WBC 21.5* 21.6* 17.8*   HGB 12.0 11.3* 11.1*   HCT 36.0* 34.5* 34.3*   MCV 94.2 96.4 95.8    283 266     LIVER PROFILE: No results for input(s): AST, ALT, LIPASE, BILIDIR, BILITOT, ALKPHOS in the last 72 hours. Invalid input(s): AMYLASE,  ALB  PT/INR: No results for input(s): PROTIME, INR in the last 72 hours. APTT: No results for input(s): APTT in the last 72 hours. BNP:  No results for input(s): BNP in the last 72 hours. Ionized Calcium:No results for input(s): IONCA in the last 72 hours. Magnesium:No results for input(s): MG in the last 72 hours. Phosphorus:No results for input(s): PHOS in the last 72 hours. HgbA1C: No results for input(s): LABA1C in the last 72 hours. Hepatic: No results for input(s): ALKPHOS, ALT, AST, PROT, BILITOT, BILIDIR, LABALBU in the last 72 hours. Lactic Acid: No results for input(s): LACTA in the last 72 hours. Troponin: No results for input(s): CKTOTAL, CKMB, TROPONINT in the last 72 hours. ABGs: No results for input(s): PH, PCO2, PO2, HCO3, O2SAT in the last 72 hours. CRP:  No results for input(s): CRP in the last 72 hours. Sed Rate:  No results for input(s): SEDRATE in the last 72 hours. Cultures:   No results for input(s): CULTURE in the last 72 hours. No results for input(s): BC, Erin Ivanoff in the last 72 hours. No results for input(s): CXSURG in the last 72 hours. Radiology reports as per the Radiologist  Radiology: XR CHEST PORTABLE    Result Date: 5/30/2021  Exam: XR CHEST PORTABLE - 5/30/2021 10:34 AM Indication: Leukocytosis Comparison: None available. Findings: Cardiomediastinal silhouette is within normal limits.  No pleural effusion, pneumothorax, or focal consolidation. No acute osseous findings. Surgical clips in the LEFT upper abdomen. No acute findings. Signed by Dr Nessa Mcbride on 5/30/2021 11:54 AM    VL DUP CAROTID BILATERAL    Result Date: 5/31/2021  Vascular Carotid Procedure  Demographics   Patient Name    Leona Kang Age                   63   Patient Number  [id number]       Gender                Female   Visit Number    [id number]    Interpreting          Hesham Marquez MD                               Physician   Date of Birth   1955   Referring Physician   Candace Riding   Accession       [id number]   [address], [place], [address]  Number  Procedure Type of Study:   Cerebral:Carotid, VL CAROTID BILATERAL. Indications for Study:Stroke. Risk Factors   - The patient's risk factor(s) include: diabetes mellitus and arterial     hypertension.   - The patient has a former tobacco history. Impression   Duplex sonography with color flow enhancement was performed bilaterally on  the cervical carotid system. No evidence of hemodynamically significant  stenosis in the bilateral carotid and vertebral arteries. Signature   ----------------------------------------------------------------  Electronically signed by Hesham Marquez MD(Interpreting  physician) on 05/31/2021 06:32 AM  ----------------------------------------------------------------  Blood Pressure:Right arm 136/82 mmHg. Left arm 134/80 mmHg. Velocities are measured in cm/s ; Diameters are measured in mm Carotid Right Measurements +------------+-------+-------+--------+-------+------------+---------------+ ! Location    ! PSV    ! EDV    ! Angle   ! RI     !%Stenosis   ! Tortuosity     ! +------------+-------+-------+--------+-------+------------+---------------+ ! Prox CCA    !88     !27.5   !60      !0.69   !            !               ! +------------+-------+-------+--------+-------+------------+---------------+ ! Mid CCA     !77     !25.1   ! 60      !0.67   !            ! ! +------------+-------+-------+--------+-------+------------+---------------+ ! Prox ICA    !63.6   !23.6   ! 60      !0.63   !            !               ! +------------+-------+-------+--------+-------+------------+---------------+ ! Mid ICA     !72.3   !30.6   ! 60      !0.58   !            !               ! +------------+-------+-------+--------+-------+------------+---------------+ ! Dist ICA    !83.3   !25.9   !60      !0.69   !            !               ! +------------+-------+-------+--------+-------+------------+---------------+ ! Prox ECA    !67.6   !14.9   !60      !0.78   !            !               ! +------------+-------+-------+--------+-------+------------+---------------+ ! Vertebral   !65.3   !19.7   !56      !0.7    ! !               ! +------------+-------+-------+--------+-------+------------+---------------+   - There is antegrade vertebral flow noted on the right side. - Additional Measurements:ICAPSV/CCAPSV 0.95. ICAEDV/CCAEDV 1.11. Carotid Left Measurements +------------+-------+-------+--------+-------+------------+---------------+ ! Location    ! PSV    ! EDV    ! Angle   ! RI     !%Stenosis   ! Tortuosity     ! +------------+-------+-------+--------+-------+------------+---------------+ ! Prox CCA    !109    !26.7   ! 60      !0.76   !            !               ! +------------+-------+-------+--------+-------+------------+---------------+ ! Mid CCA     !77     !25.9   !60      !0.66   !            !               ! +------------+-------+-------+--------+-------+------------+---------------+ ! Prox ICA    !53.4   !20.8   !60      !0.61   !            !               ! +------------+-------+-------+--------+-------+------------+---------------+ ! Mid ICA     ! 85     !27.6   ! 60      !0.68   !            !               ! +------------+-------+-------+--------+-------+------------+---------------+ ! Dist ICA    !56.3   !23.5   !60      !0.58   !            !               ! +------------+-------+-------+--------+-------+------------+---------------+ ! Prox ECA    !90.4   !18.9   !60      !0.79   !            !               ! +------------+-------+-------+--------+-------+------------+---------------+ ! Vertebral   !46     !17.7   ! 60      !0.62   !            !               ! +------------+-------+-------+--------+-------+------------+---------------+   - There is antegrade vertebral flow noted on the left side. - Additional Measurements:ICAPSV/CCAPSV 0.78. ICAEDV/CCAEDV 1.03. MRI BRAIN WO CONTRAST    Result Date: 5/29/2021  Exam: MRI BRAIN WO CONTRAST - 5/29/2021 11:31 AM Indication: Stroke like symptoms Comparison: None available. Findings: Small acute infarcts in the RIGHT periventricular white matter and RIGHT putamen. There is some matching T2 FLAIR hyperintense signal. No increased susceptibility to suggest acute or chronic hemorrhage. Major physiologic flow voids are maintained. RIGHT frontal, LEFT parietal, and RIGHT occipital areas of encephalomalacia likely representing old infarcts. Chronic microvascular ischemic white matter change. Global cerebral volume loss. No midline shift or mass effect. Lateral ventricles are nondilated. Basilar cisterns are patent. Sella turcica and its contents appear unremarkable. No acute orbital finding. Mastoid air cells and paranasal sinuses are clear. 1.  Small acute infarcts in the RIGHT periventricular white matter and RIGHT basal ganglia. 2.  Multiple bilateral remote infarcts.  Signed by Dr Sanjuana Gandhi on 5/29/2021 2:43 PM    ECHO 2D WO COLOR DOPPLER COMPLETE    Result Date: 5/29/2021  Transthoracic Echocardiography Report (TTE)  Demographics   Patient Name  Marie Angulo Date of Study         05/29/2021   MRN           [id number]       Gender                Female   Date of Birth 1955   Room Number           OIF-8861   Age           63 year(s)   Height:       65 inches    Referring Physician   Chato Sandoval MD   Weight: atrium. positive bubble study   Left Ventricle  Normal left ventricular size with preserved LV function and an estimated  ejection fraction of approximately 55-60%. No evidence of left ventricular mass or thrombus noted. Right Atrium  Normal right atrial dimension with no evidence of thrombus or mass noted. Right Ventricle  Normal right ventricular size with preserved RV function. pfo present. large shunt. aneurysmal ias. Pericardial Effusion  No evidence of significant pericardial effusion is noted. Pleural Effusion  No evidence of pleural effusion. M-Mode Measurements (cm)   LVIDd: 4.45 cm                       LVIDs: 3.29 cm  IVSd: 1.01 cm  LVPWd: 1.02 cm                       AO Root Dimension: 3.1 cm  % Ejection Fraction: 51 %            LA: 3 cm                                       LVOT: 1.9 cm  Doppler Measurements:   AV Peak Velocity:113 cm/s           MV Peak E-Wave: 50.4 cm/s  AV Peak Gradient: 5.11 mmHg         MV Peak A-Wave: 61.4 cm/s                                      MV E/A Ratio: 0.82 %                                      MV Peak Gradient: 1.02 mmHg         Assessment     Right hemisphere, cerebral infarction ischemic. Continue supportive care.     Status post PFO closure. Continue supportive care.     Persistent mental status change. Multifactorial.  Supportive care. Neurology following.       Padmaja Rodriguez DO

## 2021-06-06 NOTE — PROGRESS NOTES
[address], [address]  [place], [place]  Phone [phone]     Neurology Progress Note  2021 9:30 AM  Information:   Patient Name: Con Patel  :   1955  Age:   63 y.o. MRN:   [id number]  Account #:  [de-identified]  Admit Date:   2021  Today:  6/3/21     ADMIT DX:   Right hemisphere, cerebral infarction [facility])    Subjective:     Desi Aguillon a 63y.o. year old woman with a history of hypertension, hyperlipidemia, diabetes, cardiovascular disease, and previous strokes who was transferred from [facility]  after having yet another stroke.      Interval History:   She had PFO closure . She developed abdominal pain, urine retention, and right groin hematoma after but was able to move and talk. At 8pm, she was noted to have diminished responsiveness but did have IV opioid. She was transferred to the ICU. Was transferred to the floor on . Continues to moan most of the time. Not following commands. Objective:     Past Medical History:  Past Medical History:   Diagnosis Date    [facility]bral [facility]     COPD (chronic obstructive pulmonary disease) (HCC)     Diabetes mellitus ([facility]     Hypertension     Neuromuscular disorder ([facility]     Palliative care patient 2021       Past Surgical History:   Procedure Laterality Date    ABDOMEN SURGERY      gastric resection    APPENDECTOMY      CARDIAC SURGERY       SECTION      x2    CHOLECYSTECTOMY      CORONARY ANGIOPLASTY      SPLENECTOMY         No family history on file. Social History     Socioeconomic History    Marital status:       Spouse name: Not on file    Number of children: Not on file    Years of education: Not on file    Highest education level: Not on file   Occupational History    Not on file   Tobacco Use    Smoking status: Former Smoker     Packs/day: 1.00     Years: 28.00     Pack years: 28.00     Types: Cigarettes     Quit date: 2019     Years since quittin.4    Smokeless tobacco: Never Used   Vaping Use    Vaping Use: Never used   Substance and Sexual Activity    Alcohol use: Not Currently    Drug use: Never    Sexual activity: Not Currently   Other Topics Concern    Not on file   Social History Narrative    Not on file     Social Determinants of Health     Financial Resource Strain:     Difficulty of Paying Living Expenses:    Food Insecurity:     Worried About Running Out of Food in the Last Year:     [address] in the Last Year:    Transportation Needs:     Lack of Transportation (Medical):      Lack of Transportation (Non-Medical):    Physical Activity:     Days of Exercise per Week:     Minutes of Exercise per Session:    Stress:     Feeling of Stress :    Social Connections:     Frequency of Communication with Friends and Family:     Frequency of Social Gatherings with Friends and Family:     Attends [employer] Services:     Active Member of Clubs or Organizations:     Attends Club or Organization Meetings:     Marital Status:    Intimate Partner Violence:     Fear of Current or Ex-Partner:     Emotionally Abused:     Physically Abused:     Sexually Abused:        Medications:   sodium chloride 100 mL/hr at 21 0340    sodium chloride      dextrose        valproate sodium (DEPACON) IVPB  1,500 mg Intravenous Once    valproate sodium (DEPACON) IVPB  1,000 mg Intravenous Q8H    lacosamide (VIMPAT) IVPB  100 mg Intravenous BID    fentaNYL  1 patch Transdermal Q72H    ticagrelor  90 mg Oral BID    oxymetazoline  2 spray Each Nostril BID    metoprolol  5 mg Intravenous Q6H    sodium chloride flush  5-40 mL Intravenous 2 times per day    bisacodyl  10 mg Rectal Daily    magnesium hydroxide  30 mL Oral Daily    aspirin  81 mg Oral Daily    insulin lispro  0-6 Units Subcutaneous TID WC    insulin lispro  0-3 Units Subcutaneous Nightly       Diagnostic Studies:  Reviewed all labs/diagnositcs since last 24hrs:  Recent Results (from the past 24 hour(s))   POCT Glucose    Collection Time: 06/05/21 11:06 AM   Result Value Ref Range    POC Glucose 120 (H) 70 - 99 mg/dl    Performed on AccuChek    POCT Glucose    Collection Time: 06/05/21  4:25 PM   Result Value Ref Range    POC Glucose 139 (H) 70 - 99 mg/dl    Performed on AccuChek    POCT Glucose    Collection Time: 06/05/21  9:04 PM   Result Value Ref Range    POC Glucose 141 (H) 70 - 99 mg/dl    Performed on AccuChek    Basic Metabolic Panel w/ Reflex to MG    Collection Time: 06/06/21  5:30 AM   Result Value Ref Range    Sodium 141 136 - 145 mmol/L    Potassium reflex Magnesium 3.6 3.5 - 5.0 mmol/L    Chloride 107 98 - 111 mmol/L    CO2 26 22 - 29 mmol/L    Anion Gap 8 7 - 19 mmol/L    Glucose 143 (H) 74 - 109 mg/dL    BUN 17 8 - 23 mg/dL    CREATININE 0.9 0.5 - 0.9 mg/dL    GFR Non-African American >60 >60    GFR African American >59 >59    Calcium 9.3 8.8 - 10.2 mg/dL   CBC    Collection Time: 06/06/21  5:30 AM   Result Value Ref Range    WBC 17.8 (H) 4.8 - 10.8 K/uL    RBC 3.58 (L) 4.20 - 5.40 M/uL    Hemoglobin 11.1 (L) 12.0 - 16.0 g/dL    Hematocrit 34.3 (L) 37.0 - 47.0 %    MCV 95.8 81.0 - 99.0 fL    MCH 31.0 27.0 - 31.0 pg    MCHC 32.4 (L) 33.0 - 37.0 g/dL    RDW 13.9 11.5 - 14.5 %    Platelets 206 886 - 485 K/uL    MPV 10.5 9.4 - 12.3 fL   Valproic acid level, total    Collection Time: 06/06/21  5:30 AM   Result Value Ref Range    Valproic Acid Lvl 66.6 50.0 - 100.0 ug/mL       Diet:  Diet NPO Exceptions are: Sips with Meds    Examination:  Vitals: BP (!) 160/95   Pulse 89   Temp 98.7 °F (37.1 °C) (Temporal)   Resp 20   Ht 5' 5\" (1.651 m)   Wt 222 lb 4.8 oz (100.8 kg)   SpO2 98%   BMI 36.99 kg/m²      Intake/Output Summary (Last 24 hours) at 6/6/2021 0930  Last data filed at 6/6/2021 0718  Gross per 24 hour   Intake 1730 ml   Output 3275 ml   Net -1545 ml     General appearance: She is obtunded and moaning in the ICU.  HEENT: Exam; heent: Head: Normal, normocephalic, atraumatic. Lungs: clear to auscultation bilaterally  Heart: regular rate and rhythm, S1, S2 normal, no murmur, click, rub or gallop  Abdomen: soft, non-tender; bowel sounds normal; no masses,  no organomegaly  Extremities: extremities normal, atraumatic, no cyanosis or edema  Neurologic: Moaning. Seems to be purposefully keeping her eyes closed. I cannot get her to follow any simple commands. EXAMINATION:  MRI BRAIN WO CONTRAST  6/4/2021 3:53 PM   HISTORY: Decreasing responsiveness. Clinical concerns for CVA   COMPARISON: MR imaging dated 5/29/2021 and head CT imaging dated   6/3/2021   TECHNIQUE: Multiplanar MR imaging the brain was performed. FINDINGS:    There is image quality degradation with large amount patient motion   artifact. There is small amount of residual restricted diffusion signal   identified in the right periventricular white matter near the   body/atrium of the lateral ventricle and in the putamen, near the   external capsule is noted on the previous MR. There are no new or developing areas of restricted diffusion, acute   ischemic change/infarction. There is corresponding FLAIR signal hyperintensities there is a   previously described infarct ischemic changes. There is FLAIR signal hyperintensities in the periventricular   subcortical white matter. There is no convincing evidence of abnormal intra-axial extra-axial   blood products. .   There is no hydrocephalus. There is central and cortical atrophy.       Impression   1. Image quality degradation related to large amount of patient motion   artifact. 2. Mild residual restricted diffusion in areas of previously described   infarct in the right periventricular and basal ganglia/putamen. No   developing or acute areas of acute ischemic change/infarction. Signed by Dr Manuela Portillo on 6/4/2021 3:02 PM       Assessment:   1. Altered mental status.   No evidence of recurrent stroke by MRI. Symptoms appear to be more consistent with drug withdrawal in my opinion. Review of her EEG overnight shows possible nonconvulsive seizures. Seems to have improved some with Ativan. Gave some at the bedside this morning without any change in clinical status. Depakote level 66 today. 2.         Recent right hemisphere strokes with history of left and right hemisphere strokes convincing for cardioembolism. The P2Y12 plt inh test indicates she is a Plavix nonresponder so after PFO closure, I'd recommend Brillinta. 2.         PFO with septal aneurysm  3.         Hypertension  4.         Diabetes    Plan:   1. Bolus Depacon to get level closer to 100. Total daily dose increased. Vimpat added. Continuous EEG for the next 24 hours. Low dose fentanyl patch to cover from narcotic withdrawal.  2. PT/OT/ST  3. ASA/Brillinta/statin when able to take PO  n. Discharge planning: To be determined    More than 35 minutes were spent reviewing the patient's records, examination and and counseling and coordination of care.     Electronically signed by Francesco Kelly MD on 6/6/2021 at 9:30 AM

## 2021-06-07 NOTE — PROGRESS NOTES
Comprehensive Nutrition Assessment    Type and Reason for Visit:  Reassess    Nutrition Recommendations/Plan: Initiate EN to help meet nutritional needs: Glucerna 1.5 Diabetic formula @ 40 mL/hr to provide 1440 kcals, 79 g/PRO, 128 g/CHO, and 729 mL/fluid daily. Nutrition Assessment:  Pt has been NPO for 5 days. Recommend to initiate JOSÉ at this time to help meet nutritional needs. Malnutrition Assessment:  Malnutrition Status:   At risk for malnutrition (Comment)    Context:  Acute Illness     Findings of the 6 clinical characteristics of malnutrition:  Energy Intake:  7 - 50% or less of estimated energy requirements for 5 or more days  Weight Loss:  No significant weight loss     Body Fat Loss:  No significant body fat loss     Muscle Mass Loss:  No significant muscle mass loss    Fluid Accumulation:  No significant fluid accumulation     Strength:  Not Performed    Estimated Daily Nutrient Needs:  Energy (kcal):  7653-5884 kcals/day; Weight Used for Energy Requirements:  Current (11-14)     Protein (g):  114 g/PRO/day; Weight Used for Protein Requirements:  Ideal (2.0)        Fluid (ml/day):  2302-5358 mL/day; Method Used for Fluid Requirements:  1 ml/kcal       Current Nutrition Therapies:    Diet NPO Exceptions are: Sips with Meds    Anthropometric Measures:  · Height: 5' 5\" (165.1 cm)  · Current Body Weight: 222 lb (100.7 kg)    · Ideal Body Weight: 125 lbs  · BMI: 36.9  · BMI Categories: Obese Class 2 (BMI 35.0 -39.9)       Nutrition Diagnosis:   · Inadequate oral intake related to acute injury/trauma as evidenced by NPO or clear liquid status due to medical condition    Nutrition Interventions:   Food and/or Nutrient Delivery:  Continue NPO, Start Tube Feeding  Coordination of Nutrition Care:  Continue to monitor while inpatient    Goals:  Pt will progress to an oral diet to meet nutritional needs or needs will be met with JOSÉ       Nutrition Monitoring and Evaluation:   Food/Nutrient Intake

## 2021-06-07 NOTE — PROGRESS NOTES
[facility]  [address], [address]  [place], [place]  Phone [phone]     Neurology Progress Note  2021 5:27 PM  Information:   Patient Name: Indy Walters  :   1955  Age:   63 y.o. MRN:   [id number]  Account #:  [de-identified]  Admit Date:   2021  Today:  21     ADMIT DX:   Right hemisphere, cerebral infarction [facility])    Subjective:     Darreld Gosselin a 63y.o. year old woman with a history of hypertension, hyperlipidemia, diabetes, cardiovascular disease, and previous strokes who was transferred from [facility]  after having yet another stroke with left sided weakness. She recovered fairly well and had a PFO closure on . That evening she became minimally responsive. Interval History:   She remains minimally responsive. She moans when disturbed. She will stop when spoken to and she occasionally will say a word or follow a single command. Objective:     Past Medical History:  Past Medical History:   Diagnosis Date    [facility]bral [facility] occlusion with [facility] infarction [facility] [address] [facility]     COPD (chronic obstructive pulmonary disease) (HCC)     Diabetes mellitus ([facility] [facility]     Hypertension     Neuromuscular disorder ([facility] [address] [facility]     Palliative care patient 2021       Past Surgical History:   Procedure Laterality Date    ABDOMEN SURGERY      gastric resection    APPENDECTOMY      CARDIAC SURGERY       SECTION      x2    CHOLECYSTECTOMY      CORONARY ANGIOPLASTY      SPLENECTOMY         No family history on file. Social History     Socioeconomic History    Marital status:       Spouse name: Not on file    Number of children: Not on file    Years of education: Not on file    Highest education level: Not on file   Occupational History    Not on file   Tobacco Use    Smoking status: Former Smoker     Packs/day: 1.00     Years: 28.00     Pack years: 28.00     Types: Cigarettes     Quit date: 2019     Years since quittin.4    Smokeless tobacco: Never Used   Vaping Use    Vaping Use: Never used   Substance and Sexual Activity    Alcohol use: Not Currently    Drug use: Never    Sexual activity: Not Currently   Other Topics Concern    Not on file   Social History Narrative    Not on file     Social Determinants of Health     Financial Resource Strain:     Difficulty of Paying Living Expenses:    Food Insecurity:     Worried About Running Out of Food in the Last Year:     [address] in the Last Year:    Transportation Needs:     Lack of Transportation (Medical):      Lack of Transportation (Non-Medical):    Physical Activity:     Days of Exercise per Week:     Minutes of Exercise per Session:    Stress:     Feeling of Stress :    Social Connections:     Frequency of Communication with Friends and Family:     Frequency of Social Gatherings with Friends and Family:     Attends [employer] Services:     Active Member of Clubs or Organizations:     Attends Club or Organization Meetings:     Marital Status:    Intimate Partner Violence:     Fear of Current or Ex-Partner:     Emotionally Abused:     Physically Abused:     Sexually Abused:        Medications:   sodium chloride 100 mL/hr at 06/05/21 0340    sodium chloride      dextrose        valproate sodium (DEPACON) IVPB  1,000 mg Intravenous Q8H    lacosamide (VIMPAT) IVPB  100 mg Intravenous BID    fentaNYL  1 patch Transdermal Q72H    ticagrelor  90 mg Oral BID    oxymetazoline  2 spray Each Nostril BID    metoprolol  5 mg Intravenous Q6H    sodium chloride flush  5-40 mL Intravenous 2 times per day    bisacodyl  10 mg Rectal Daily    magnesium hydroxide  30 mL Oral Daily    aspirin  81 mg Oral Daily    insulin lispro  0-6 Units Subcutaneous TID WC    insulin lispro  0-3 Units Subcutaneous Nightly       Diagnostic Studies:  Reviewed all labs/diagnositcs since last 24hrs:  Recent Results (from the past 24 hour(s))   POCT Glucose    Collection Time: 06/06/21  7:55 PM   Result Value Ref Range    POC Glucose 104 (H) 70 - 99 mg/dl    Performed on AccuChek    Basic Metabolic Panel w/ Reflex to MG    Collection Time: 06/07/21  3:00 AM   Result Value Ref Range    Sodium 143 136 - 145 mmol/L    Potassium reflex Magnesium 3.3 (L) 3.5 - 5.0 mmol/L    Chloride 109 98 - 111 mmol/L    CO2 25 22 - 29 mmol/L    Anion Gap 9 7 - 19 mmol/L    Glucose 100 74 - 109 mg/dL    BUN 17 8 - 23 mg/dL    CREATININE 0.9 0.5 - 0.9 mg/dL    GFR Non-African American >60 >60    GFR African American >59 >59    Calcium 8.6 (L) 8.8 - 10.2 mg/dL   CBC    Collection Time: 06/07/21  3:00 AM   Result Value Ref Range    WBC 16.2 (H) 4.8 - 10.8 K/uL    RBC 3.28 (L) 4.20 - 5.40 M/uL    Hemoglobin 10.4 (L) 12.0 - 16.0 g/dL    Hematocrit 31.7 (L) 37.0 - 47.0 %    MCV 96.6 81.0 - 99.0 fL    MCH 31.7 (H) 27.0 - 31.0 pg    MCHC 32.8 (L) 33.0 - 37.0 g/dL    RDW 14.0 11.5 - 14.5 %    Platelets 576 040 - 068 K/uL    MPV 10.9 9.4 - 12.3 fL   Valproic acid level, total    Collection Time: 06/07/21  3:00 AM   Result Value Ref Range    Valproic Acid Lvl 67.5 50.0 - 100.0 ug/mL   Magnesium    Collection Time: 06/07/21  3:00 AM   Result Value Ref Range    Magnesium 1.5 (L) 1.6 - 2.4 mg/dL   POCT Glucose    Collection Time: 06/07/21 11:13 AM   Result Value Ref Range    POC Glucose 93 70 - 99 mg/dl    Performed on AccuChek    POCT Glucose    Collection Time: 06/07/21  5:13 PM   Result Value Ref Range    POC Glucose 105 (H) 70 - 99 mg/dl    Performed on AccuChek      EEG - diffuse slowing    Diet:  Diet NPO Exceptions are: Sips with Meds    Examination:  Vitals: /80   Pulse 80   Temp 97.2 °F (36.2 °C) (Temporal)   Resp 18   Ht 5' 5\" (1.651 m)   Wt 222 lb 4.8 oz (100.8 kg)   SpO2 97%   BMI 36.99 kg/m²      Intake/Output Summary (Last 24 hours) at 6/7/2021 1727  Last data filed at 6/7/2021 1000  Gross per 24 hour   Intake 1561 ml   Output 1450 ml   Net 111 ml     General appearance: She is obtunded and moaning.   HEENT: Exam; heent: Head: Normal, normocephalic, atraumatic. No nuchal rigidity  Lungs: clear to auscultation bilaterally  Heart: regular rate and rhythm, S1, S2 normal, no murmur, click, rub or gallop  Abdomen: soft, non-tender; bowel sounds normal; no masses,  no organomegaly  Extremities: extremities normal, atraumatic, no cyanosis or edema  Neurologic:  Obtunded. No response to voice or light tactile stimulus. She wakens briefly to noxious stimulus and moans. She does not open eyes, follow commands, or answer questions. She does not talk. No facial asymmetry noted. She does not move the right arm. She localizes and with and withdraws the left arm. She moves both legs purposefully. Assessment:   1. Encephalopathy. Clinically consistent with left hemisphere infarct but MRI negative. Concerns for seizures but not definite on EEG and 2 anticonvulsants and Ativan have not helped. 2. Recent right hemisphere strokes with history of left and right hemisphere strokes convincing for cardioembolism.  The P2Y12 plt inh test indicates she is a Plavix nonresponder   3. PFO with septal aneurysm s/p closure device placement  4. Hypertension  5. Diabetes  6. Myelodysplasia with chronic leukocytosis    Plan:   1. Re CT head tomorrow  2. Continue Depacon and Vimpat for now  3. She will likely need PEG  4. If re CT negative, will have to consider LP. She received one dose of Brillinta 90 mg on 6/4 and one on 6/5, none since     Discharge planning:   SNF    Reviewed treatment plans with the patient and/or family. 35 minutes spent in face to face interaction and coordination of care.      Electronically signed by Cheikh Galdamez MD on 6/7/2021 at 5:27 PM

## 2021-06-07 NOTE — PROGRESS NOTES
Hospitalist Progress Note    Patient:  Ravindra Mcgee  YOB: 1955  Date of Service: 6/7/2021  MRN: [id number]   Acct: [de-identified]   Primary Care Physician: Mario Sanz MD  Advance Directive: Full Code  Admit Date: 5/29/2021       Hospital Day: 9  Referring Provider: Padmaja Rodriguez DO    Patient Seen, Chart, Consults, Notes, Labs, Radiology studies reviewed. Subjective:  Ravindra Mcgee is a 63 y.o. female  whom we are following for mental status change, encephalopathy, new acute ischemic right hemispheric stroke. Unfortunately no significant clinical improvement. She continues to lie in bed and moan.     Allergies:  Ibuprofen, Metformin, Hydrocodone, and Nsaids    Medicines:  Current Facility-Administered Medications   Medication Dose Route Frequency Provider Last Rate Last Admin    valproate (DEPACON) 1,000 mg in dextrose 5 % 100 mL IVPB  1,000 mg Intravenous Q8H Juan Alberto Crane MD   Stopped at 06/07/21 1631    lacosamide (VIMPAT) 100 mg in dextrose 5 % 60 mL IVPB  100 mg Intravenous BID Juan Alberto Crane MD   Stopped at 06/07/21 1103    fentaNYL (DURAGESIC) 25 MCG/HR 1 patch  1 patch Transdermal Q72H Juan Alberto Crane MD   1 patch at 06/06/21 1001    ticagrelor (BRILINTA) tablet 90 mg  90 mg Oral BID Frantz Ramírez MD   90 mg at 06/04/21 1144    LORazepam (ATIVAN) injection 0.5 mg  0.5 mg Intravenous Q4H PRN Juan Alberto Crane MD   0.5 mg at 06/06/21 0852    oxymetazoline (AFRIN) 0.05 % nasal spray 2 spray  2 spray Each Nostril BID Padmaja Rodriguez DO   2 spray at 06/05/21 7816    metoprolol (LOPRESSOR) injection 5 mg  5 mg Intravenous Q6H Padmaja Rodriguez DO   5 mg at 06/07/21 1449    hydrALAZINE (APRESOLINE) injection 5 mg  5 mg Intravenous Q4H PRN Sally Fishman MD   5 mg at 06/04/21 1706    0.9 % sodium chloride infusion   Intravenous Continuous Sally Fishman  mL/hr at 06/05/21 0340 New Bag at 06/05/21 0340    perflutren lipid microspheres (DEFINITY) injection 1.65 mg  1.5 mL Intravenous ONCE PRN ALEXYS Garcias CNP        sodium chloride flush 0.9 % injection 5-40 mL  5-40 mL Intravenous 2 times per day ALEXYS Garcias CNP   10 mL at 06/06/21 1932    sodium chloride flush 0.9 % injection 5-40 mL  5-40 mL Intravenous PRN ALEXYS Garcias CNP   10 mL at 06/05/21 1134    0.9 % sodium chloride infusion  25 mL Intravenous PRN ALEXYS Rizvi CNP        acetaminophen (TYLENOL) tablet 650 mg  650 mg Oral Q4H PRN ALEXYS Gacrias CNP        bisacodyl (DULCOLAX) suppository 10 mg  10 mg Rectal Daily Saeid Scruggs MD   10 mg at 06/07/21 1003    magnesium hydroxide (MILK OF MAGNESIA) 400 MG/5ML suspension 30 mL  30 mL Oral Daily Saeid Scruggs MD   30 mL at 06/04/21 1144    polyethylene glycol (GLYCOLAX) packet 17 g  17 g Oral Daily PRN Mellissa White MD        acetaminophen (TYLENOL) suppository 650 mg  650 mg Rectal Q6H PRN Mellissa White MD        potassium chloride (KLOR-CON M) extended release tablet 40 mEq  40 mEq Oral PRN Mellissa White MD        Or    potassium bicarb-citric acid (EFFER-K) effervescent tablet 40 mEq  40 mEq Oral PRN Mellissa White MD        Or    potassium chloride 10 mEq/100 mL IVPB (Peripheral Line)  10 mEq Intravenous PRN Mellissa White MD        potassium chloride 10 mEq/100 mL IVPB (Peripheral Line)  10 mEq Intravenous PRN Mellissa White  mL/hr at 06/07/21 0505 10 mEq at 06/07/21 0505    ondansetron (ZOFRAN) injection 4 mg  4 mg Intravenous Q6H PRN Mellissa White MD        aspirin chewable tablet 81 mg  81 mg Oral Daily Mellissa White MD   81 mg at 06/04/21 1144    insulin lispro (HUMALOG) injection vial 0-6 Units  0-6 Units Subcutaneous TID WC Mellissa White MD   1 Units at 06/04/21 1706    insulin lispro (HUMALOG) injection vial 0-3 Units  0-3 Units Subcutaneous Nightly Em Lopez MD   1 Units at 21    glucose (GLUTOSE) 40 % oral gel 15 g  15 g Oral PRN Em Lopez MD        dextrose 50 % IV solution  12.5 g Intravenous PRN Em Lopez MD        glucagon (rDNA) injection 1 mg  1 mg Intramuscular PRN Em Lopez MD        dextrose 5 % solution  100 mL/hr Intravenous PRN Em Lopez MD           Past Medical History:  Past Medical History:   Diagnosis Date    [facility]bral [facility] infarction [facility]     [facility]     Diabetes mellitus [facility]     Hypertension     [facility]romuscular disorder ([facility]     Palliative care patient 2021       Past Surgical History:  Past Surgical History:   Procedure Laterality Date    ABDOMEN SURGERY      gastric resection    APPENDECTOMY      CARDIAC SURGERY       SECTION      x2    CHOLECYSTECTOMY      CORONARY ANGIOPLASTY      SPLENECTOMY         Family History  No family history on file. Social History  Social History     Socioeconomic History    Marital status:       Spouse name: Not on file    Number of children: Not on file    Years of education: Not on file    Highest education level: Not on file   Occupational History    Not on file   Tobacco Use    Smoking status: Former Smoker     Packs/day: 1.00     Years: 28.00     Pack years: 28.00     Types: Cigarettes     Quit date: 2019     Years since quittin.4    Smokeless tobacco: Never Used   Vaping Use    Vaping Use: Never used   Substance and Sexual Activity    Alcohol use: Not Currently    Drug use: Never    Sexual activity: Not Currently   Other Topics Concern    Not on file   Social History Narrative    Not on file     Social Determinants of Health     Financial Resource Strain:     Difficulty of Paying Living Expenses:    Food Insecurity:     Worried About Running Out of Food in the Last Year:     Jonatan valenzuela Food in the Last Year:    Transportation Needs:     Lack of Transportation (Medical):  Lack of Transportation (Non-Medical):    Physical Activity:     Days of Exercise per Week:     Minutes of Exercise per Session:    Stress:     Feeling of Stress :    Social Connections:     Frequency of Communication with Friends and Family:     Frequency of Social Gatherings with Friends and Family:     Attends [employer] Services:     Active Member of Clubs or Organizations:     Attends Club or Organization Meetings:     Marital Status:    Intimate Partner Violence:     Fear of Current or Ex-Partner:     Emotionally Abused:     Physically Abused:     Sexually Abused:          Review of Systems:    Review of Systems   Unable to perform ROS: Mental status change           Objective:  Blood pressure 139/80, pulse 80, temperature 97.2 °F (36.2 °C), temperature source Temporal, resp. rate 18, height 5' 5\" (1.651 m), weight 222 lb 4.8 oz (100.8 kg), SpO2 97 %. Intake/Output Summary (Last 24 hours) at 6/7/2021 1632  Last data filed at 6/7/2021 1000  Gross per 24 hour   Intake 1561 ml   Output 1450 ml   Net 111 ml       Physical Exam  Vitals and nursing note reviewed. Constitutional:       Appearance: She is ill-appearing. HENT:      Head: Normocephalic and atraumatic. Right Ear: External ear normal.      Left Ear: External ear normal.      Nose: Nose normal.      Mouth/Throat:      Mouth: Mucous membranes are moist.   Eyes:      Conjunctiva/sclera: Conjunctivae normal.      Pupils: Pupils are equal, round, and reactive to light. Cardiovascular:      Rate and Rhythm: Normal rate and regular rhythm. Heart sounds: Normal heart sounds. Pulmonary:      Effort: Pulmonary effort is normal.      Breath sounds: Normal breath sounds. Abdominal:      General: Abdomen is flat. Palpations: Abdomen is soft. Musculoskeletal:         General: Normal range of motion. Cervical back: Neck supple.  No rigidity. No muscular tenderness. Skin:     General: Skin is warm and dry. Labs:  BMP:   Recent Labs     06/05/21  0415 06/06/21  0530 06/07/21  0300    141 143   K 3.9 3.6 3.3*    107 109   CO2 24 26 25   BUN 21 17 17   CREATININE 1.1* 0.9 0.9   CALCIUM 9.2 9.3 8.6*     CBC:   Recent Labs     06/05/21  0415 06/06/21  0530 06/07/21  0300   WBC 21.6* 17.8* 16.2*   HGB 11.3* 11.1* 10.4*   HCT 34.5* 34.3* 31.7*   MCV 96.4 95.8 96.6    266 252     LIVER PROFILE: No results for input(s): AST, ALT, LIPASE, BILIDIR, BILITOT, ALKPHOS in the last 72 hours. Invalid input(s): AMYLASE,  ALB  PT/INR: No results for input(s): PROTIME, INR in the last 72 hours. APTT: No results for input(s): APTT in the last 72 hours. BNP:  No results for input(s): BNP in the last 72 hours. Ionized Calcium:No results for input(s): IONCA in the last 72 hours. Magnesium:  Recent Labs     06/07/21  0300   MG 1.5*     Phosphorus:No results for input(s): PHOS in the last 72 hours. HgbA1C: No results for input(s): LABA1C in the last 72 hours. Hepatic: No results for input(s): ALKPHOS, ALT, AST, PROT, BILITOT, BILIDIR, LABALBU in the last 72 hours. Lactic Acid: No results for input(s): LACTA in the last 72 hours. Troponin: No results for input(s): CKTOTAL, CKMB, TROPONINT in the last 72 hours. ABGs: No results for input(s): PH, PCO2, PO2, HCO3, O2SAT in the last 72 hours. CRP:  No results for input(s): CRP in the last 72 hours. Sed Rate:  No results for input(s): SEDRATE in the last 72 hours. Cultures:   No results for input(s): CULTURE in the last 72 hours. No results for input(s): BC, Lurene Cypher in the last 72 hours. No results for input(s): CXSURG in the last 72 hours. Radiology reports as per the Radiologist  Radiology: XR CHEST PORTABLE    Result Date: 5/30/2021  Exam: XR CHEST PORTABLE - 5/30/2021 10:34 AM Indication: Leukocytosis Comparison: None available.  Findings: Cardiomediastinal silhouette is within normal limits. No pleural effusion, pneumothorax, or focal consolidation. No acute osseous findings. Surgical clips in the LEFT upper abdomen. No acute findings. Signed by Dr Imelda Sorto on 5/30/2021 11:54 AM    VL DUP CAROTID BILATERAL    Result Date: 5/31/2021  Vascular Carotid Procedure  Demographics   Patient Name    Vannessa Frank Age                   63   Patient Number  [id number]       Gender                Female   Visit Number    [id number]    Interpreting          Maria Elena Mtz MD                               Physician   Date of Birth   1955   Referring Physician   Randell Nguyễn   Accession       [id number]   [address], [place], [address]  Number  Procedure Type of Study:   Cerebral:Carotid, VL CAROTID BILATERAL. Indications for Study:Stroke. Risk Factors   - The patient's risk factor(s) include: diabetes mellitus and arterial     hypertension.   - The patient has a former tobacco history. Impression   Duplex sonography with color flow enhancement was performed bilaterally on  the cervical carotid system. No evidence of hemodynamically significant  stenosis in the bilateral carotid and vertebral arteries. Signature   ----------------------------------------------------------------  Electronically signed by Maria Elena Mtz MD(Interpreting  physician) on 05/31/2021 06:32 AM  ----------------------------------------------------------------  Blood Pressure:Right arm 136/82 mmHg. Left arm 134/80 mmHg. Velocities are measured in cm/s ; Diameters are measured in mm Carotid Right Measurements +------------+-------+-------+--------+-------+------------+---------------+ ! Location    ! PSV    ! EDV    ! Angle   ! RI     !%Stenosis   ! Tortuosity     ! +------------+-------+-------+--------+-------+------------+---------------+ ! Prox CCA    !88     !27.5   !60      !0.69   !            !               ! +------------+-------+-------+--------+-------+------------+---------------+ ! Mid CCA     !77     !25.1   ! 60      !0.67   !            !               ! +------------+-------+-------+--------+-------+------------+---------------+ ! Prox ICA    !63.6   !23.6   ! 60      !0.63   !            !               ! +------------+-------+-------+--------+-------+------------+---------------+ ! Mid ICA     !72.3   !30.6   ! 60      !0.58   !            !               ! +------------+-------+-------+--------+-------+------------+---------------+ ! Dist ICA    !83.3   !25.9   !60      !0.69   !            !               ! +------------+-------+-------+--------+-------+------------+---------------+ ! Prox ECA    !67.6   !14.9   !60      !0.78   !            !               ! +------------+-------+-------+--------+-------+------------+---------------+ ! Vertebral   !65.3   !19.7   !56      !0.7    ! !               ! +------------+-------+-------+--------+-------+------------+---------------+   - There is antegrade vertebral flow noted on the right side. - Additional Measurements:ICAPSV/CCAPSV 0.95. ICAEDV/CCAEDV 1.11. Carotid Left Measurements +------------+-------+-------+--------+-------+------------+---------------+ ! Location    ! PSV    ! EDV    ! Angle   ! RI     !%Stenosis   ! Tortuosity     ! +------------+-------+-------+--------+-------+------------+---------------+ ! Prox CCA    !109    !26.7   ! 60      !0.76   !            !               ! +------------+-------+-------+--------+-------+------------+---------------+ ! Mid CCA     !77     !25.9   !60      !0.66   !            !               ! +------------+-------+-------+--------+-------+------------+---------------+ ! Prox ICA    !53.4   !20.8   !60      !0.61   !            !               ! +------------+-------+-------+--------+-------+------------+---------------+ ! Mid ICA     ! 85     !27.6   ! 60      !0.68   !            !               ! +------------+-------+-------+--------+-------+------------+---------------+ ! Dist ICA    !56.3   !23.5 !60      !0.58   !            !               ! +------------+-------+-------+--------+-------+------------+---------------+ ! Prox ECA    !90.4   !18.9   !60      !0.79   !            !               ! +------------+-------+-------+--------+-------+------------+---------------+ ! Vertebral   !46     !17.7   ! 60      !0.62   !            !               ! +------------+-------+-------+--------+-------+------------+---------------+   - There is antegrade vertebral flow noted on the left side. - Additional Measurements:ICAPSV/CCAPSV 0.78. ICAEDV/CCAEDV 1.03. MRI BRAIN WO CONTRAST    Result Date: 5/29/2021  Exam: MRI BRAIN WO CONTRAST - 5/29/2021 11:31 AM Indication: Stroke like symptoms Comparison: None available. Findings: Small acute infarcts in the RIGHT periventricular white matter and RIGHT putamen. There is some matching T2 FLAIR hyperintense signal. No increased susceptibility to suggest acute or chronic hemorrhage. Major physiologic flow voids are maintained. RIGHT frontal, LEFT parietal, and RIGHT occipital areas of encephalomalacia likely representing old infarcts. Chronic microvascular ischemic white matter change. Global cerebral volume loss. No midline shift or mass effect. Lateral ventricles are nondilated. Basilar cisterns are patent. Sella turcica and its contents appear unremarkable. No acute orbital finding. Mastoid air cells and paranasal sinuses are clear. 1.  Small acute infarcts in the RIGHT periventricular white matter and RIGHT basal ganglia. 2.  Multiple bilateral remote infarcts.  Signed by Dr Guillermina Mendes on 5/29/2021 2:43 PM    ECHO 2D WO COLOR DOPPLER COMPLETE    Result Date: 5/29/2021  Transthoracic Echocardiography Report (TTE)  Demographics   Patient Name  Janeen Farrell Date of Study         05/29/2021   MRN           [id number]       Gender                Female   Date of Birth 1955   Room Number           EYY-7706   Age           63 year(s)   Height:       65 inches Referring Physician   To Jacob MD   Weight:       220 pounds   Sonographer           Loly Beck RUST   BSA:          2.06 m^2     Interpreting          Nancy Allen MD                             Physician   BMI:          36.61 kg/m^2  Procedure Type of Study   TTE procedure:ECHOCARDIOGRAM COMPLETE 2D WO COLOR DOPPLER. Study Location: Echo Lab Technical Quality: Adequate visualization Patient Status: Inpatient Contrast Medium: Bubble Study. Indications:Patent foramen ovale (PFO). Conclusions   Summary  Normal size left atrium. positive bubble study  Normal right atrial dimension with no evidence of thrombus or mass noted. Normal right ventricular size with preserved RV function. Normal right ventricular size with preserved RV function. pfo present  Normal right ventricular size with preserved RV function. pfo present. large shunt. aneurysmal ias. No evidence of significant pericardial effusion is noted. No evidence of pleural effusion. Structurally normal mitral valve with normal leaflet mobility. No evidence  of mitral valve stenosis or mild mitral regurgitation. Aortic valve appears to be tricuspid. Structurally normal aortic valve. No significant aortic regurgitation or stenosis is noted. Tricuspid valve is structurally normal.  No evidence of tricuspid regurgitation. Signature   ----------------------------------------------------------------  Electronically signed by Nancy Allen MD(Interpreting  physician) on 05/29/2021 03:38 PM  ----------------------------------------------------------------   Findings   Mitral Valve  Structurally normal mitral valve with normal leaflet mobility. No evidence  of mitral valve stenosis or mild mitral regurgitation. Aortic Valve  Aortic valve appears to be tricuspid. Structurally normal aortic valve. No significant aortic regurgitation or stenosis is noted.    Tricuspid Valve  Tricuspid valve is structurally normal.  No evidence of tricuspid regurgitation. Left Atrium  Normal size left atrium. positive bubble study   Left Ventricle  Normal left ventricular size with preserved LV function and an estimated  ejection fraction of approximately 55-60%. No evidence of left ventricular mass or thrombus noted. Right Atrium  Normal right atrial dimension with no evidence of thrombus or mass noted. Right Ventricle  Normal right ventricular size with preserved RV function. pfo present. large shunt. aneurysmal ias. Pericardial Effusion  No evidence of significant pericardial effusion is noted. Pleural Effusion  No evidence of pleural effusion. M-Mode Measurements (cm)   LVIDd: 4.45 cm                       LVIDs: 3.29 cm  IVSd: 1.01 cm  LVPWd: 1.02 cm                       AO Root Dimension: 3.1 cm  % Ejection Fraction: 51 %            LA: 3 cm                                       LVOT: 1.9 cm  Doppler Measurements:   AV Peak Velocity:113 cm/s           MV Peak E-Wave: 50.4 cm/s  AV Peak Gradient: 5.11 mmHg         MV Peak A-Wave: 61.4 cm/s                                      MV E/A Ratio: 0.82 %                                      MV Peak Gradient: 1.02 mmHg         Assessment     Right hemisphere, cerebral infarction ischemic. Continue supportive care. Will ask  to look at skilled nursing placement.     Status post PFO closure. Continue supportive care.     Persistent mental status change. Multifactorial.  Supportive care. Neurology following.       Carolina Rea DO

## 2021-06-07 NOTE — PLAN OF CARE
Nutrition Problem #1: Inadequate oral intake  Intervention: Food and/or Nutrient Delivery: Continue NPO, Start Tube Feeding  Nutritional Goals: Pt will progress to an oral diet to meet nutritional needs or needs will be met with JOS[facility]

## 2021-06-07 NOTE — PROGRESS NOTES
Occupational Therapy  Note patient to have a bedrest order. Nursing looking into this. Having a test this am as well. Will continue to follow.  Electronically signed by Lu Stern OT on 6/7/2021 at 1:12 PM

## 2021-06-08 NOTE — PROGRESS NOTES
Hospitalist Progress Note    Patient:  Дмитрий Barragan  YOB: 1955  Date of Service: 6/8/2021  MRN: [id number]   Acct: [de-identified]   Primary Care Physician: Ree Vizcaino MD  Advance Directive: Full Code  Admit Date: 5/29/2021       Hospital Day: 10  Referring Provider: Josseline Hyde DO    Patient Seen, Chart, Consults, Notes, Labs, Radiology studies reviewed. Subjective:  Дмитрий Barragan is a 63 y.o. female  whom we are following for mental status change, encephalopathy, new acute ischemic right hemispheric stroke. No significant clinical change. Currently unable to get adequate nutrition. Speech recommends PEG tube placement.     Allergies:  Ibuprofen, Metformin, Hydrocodone, and Nsaids    Medicines:  Current Facility-Administered Medications   Medication Dose Route Frequency Provider Last Rate Last Admin    valproate (DEPACON) 1,000 mg in dextrose 5 % 100 mL IVPB  1,000 mg Intravenous Q8H Chantale White  mL/hr at 06/08/21 1432 1,000 mg at 06/08/21 1432    lacosamide (VIMPAT) 100 mg in dextrose 5 % 60 mL IVPB  100 mg Intravenous BID Chantale White MD   Stopped at 06/08/21 1047    fentaNYL (DURAGESIC) 25 MCG/HR 1 patch  1 patch Transdermal Q72H Chantale White MD   1 patch at 06/06/21 1001    ticagrelor (BRILINTA) tablet 90 mg  90 mg Oral BID Joshua Abdi MD   90 mg at 06/04/21 1144    LORazepam (ATIVAN) injection 0.5 mg  0.5 mg Intravenous Q4H PRN Chantale White MD   0.5 mg at 06/06/21 0852    metoprolol (LOPRESSOR) injection 5 mg  5 mg Intravenous Q6H Josseline Hyde DO   5 mg at 06/08/21 1432    hydrALAZINE (APRESOLINE) injection 5 mg  5 mg Intravenous Q4H PRN Richard Brock MD   5 mg at 06/04/21 1706    0.9 % sodium chloride infusion   Intravenous Continuous Richard Brock  mL/hr at 06/08/21 0341 New Bag at 06/08/21 0341    perflutren lipid microspheres (DEFINITY) injection 1.65 mg  1.5 mL Intravenous ONCE PRN ALEXYS lAarcon - CNP  sodium chloride flush 0.9 % injection 5-40 mL  5-40 mL Intravenous 2 times per day ALEXYS Elias CNP   10 mL at 06/08/21 1007    sodium chloride flush 0.9 % injection 5-40 mL  5-40 mL Intravenous PRN ALEXYS Elias CNP   10 mL at 06/08/21 0341    0.9 % sodium chloride infusion  25 mL Intravenous PRN ALEXYS Garcia - CNP        acetaminophen (TYLENOL) tablet 650 mg  650 mg Oral Q4H PRN ALEXYS Elias - CNP        bisacodyl (DULCOLAX) suppository 10 mg  10 mg Rectal Daily Sally Fishman MD   10 mg at 06/08/21 1017    magnesium hydroxide (MILK OF MAGNESIA) 400 MG/5ML suspension 30 mL  30 mL Oral Daily Sally Fishman MD   30 mL at 06/04/21 1144    polyethylene glycol (GLYCOLAX) packet 17 g  17 g Oral Daily PRN Malathi Eastman MD        acetaminophen (TYLENOL) suppository 650 mg  650 mg Rectal Q6H PRN Malathi Eastman MD        potassium chloride (KLOR-CON M) extended release tablet 40 mEq  40 mEq Oral PRN Malathi Eastman MD        Or    potassium bicarb-citric acid (EFFER-K) effervescent tablet 40 mEq  40 mEq Oral PRN Malathi Eastman MD        Or    potassium chloride 10 mEq/100 mL IVPB (Peripheral Line)  10 mEq Intravenous PRN Malathi Eastman MD        potassium chloride 10 mEq/100 mL IVPB (Peripheral Line)  10 mEq Intravenous PRN Malathi Eastman  mL/hr at 06/07/21 1713 10 mEq at 06/07/21 1713    ondansetron (ZOFRAN) injection 4 mg  4 mg Intravenous Q6H PRN Malathi Eastman MD        aspirin chewable tablet 81 mg  81 mg Oral Daily Malathi Eastman MD   81 mg at 06/04/21 1144    insulin lispro (HUMALOG) injection vial 0-6 Units  0-6 Units Subcutaneous TID WC Malathi Eastman MD   1 Units at 06/04/21 1706    insulin lispro (HUMALOG) injection vial 0-3 Units  0-3 Units Subcutaneous Nightly Malathi Eastman MD   1 Units at 06/05/21 0822    glucose (GLUTOSE) 40 % oral gel 15 g  15 g Oral PRN Myles Delgadillo MD        dextrose 50 % IV solution  12.5 g Intravenous PRN Myles Delgadillo MD        glucagon (rDNA) injection 1 mg  1 mg Intramuscular PRN Myles Delgadillo MD        dextrose 5 % solution  100 mL/hr Intravenous PRN Myles Delgadillo MD           Past Medical History:  Past Medical History:   Diagnosis Date    [facility]bral artery occlusion with [facility] infarction ([facility]     COPD (chronic obstructive pulmonary disease) ([facility]     Diabetes mellitus ([facility]     Hypertension     Neuromuscular disorder ([facility]     Palliative care patient 2021       Past Surgical History:  Past Surgical History:   Procedure Laterality Date    ABDOMEN SURGERY      gastric resection    APPENDECTOMY      CARDIAC SURGERY       SECTION      x2    CHOLECYSTECTOMY      CORONARY ANGIOPLASTY      SPLENECTOMY         Family History  No family history on file. Social History  Social History     Socioeconomic History    Marital status:       Spouse name: Not on file    Number of children: Not on file    Years of education: Not on file    Highest education level: Not on file   Occupational History    Not on file   Tobacco Use    Smoking status: Former Smoker     Packs/day: 1.00     Years: 28.00     Pack years: 28.00     Types: Cigarettes     Quit date: 2019     Years since quittin.4    Smokeless tobacco: Never Used   Vaping Use    Vaping Use: Never used   Substance and Sexual Activity    Alcohol use: Not Currently    Drug use: Never    Sexual activity: Not Currently   Other Topics Concern    Not on file   Social History Narrative    Not on file     Social Determinants of Health     Financial Resource Strain:     Difficulty of Paying Living Expenses:    Food Insecurity:     Worried About [address] in the Last Year:     [address] in the Last Year:    Transportation Needs:     Lack of Transportation (Medical):  Lack of Transportation (Non-Medical):    Physical Activity:     Days of Exercise per Week:     Minutes of Exercise per Session:    Stress:     Feeling of Stress :    Social Connections:     Frequency of Communication with Friends and Family:     Frequency of Social Gatherings with Friends and Family:     Attends Congregational Services:     Active Member of Clubs or Organizations:     Attends Club or Organization Meetings:     Marital Status:    Intimate Partner Violence:     Fear of Current or Ex-Partner:     Emotionally Abused:     Physically Abused:     Sexually Abused:          Review of Systems:    Review of Systems   Unable to perform ROS: Mental status change           Objective:  Blood pressure (!) 187/94, pulse 84, temperature 96.4 °F (35.8 °C), temperature source Temporal, resp. rate 16, height 5' 5\" (1.651 m), weight 222 lb 4.8 oz (100.8 kg), SpO2 98 %. Intake/Output Summary (Last 24 hours) at 6/8/2021 1541  Last data filed at 6/8/2021 1017  Gross per 24 hour   Intake 1327 ml   Output 1250 ml   Net 77 ml       Physical Exam  Vitals and nursing note reviewed. Constitutional:       Appearance: She is ill-appearing. HENT:      Head: Normocephalic and atraumatic. Right Ear: External ear normal.      Left Ear: External ear normal.      Nose: Nose normal.      Mouth/Throat:      Mouth: Mucous membranes are moist.   Eyes:      Conjunctiva/sclera: Conjunctivae normal.      Pupils: Pupils are equal, round, and reactive to light. Cardiovascular:      Rate and Rhythm: Normal rate and regular rhythm. Heart sounds: Normal heart sounds. Pulmonary:      Effort: Pulmonary effort is normal.      Breath sounds: Normal breath sounds. Abdominal:      General: Abdomen is flat. Palpations: Abdomen is soft. Musculoskeletal:         General: Normal range of motion. Cervical back: Neck supple. No rigidity. No muscular tenderness. Skin:     General: Skin is warm and dry. Labs:  BMP:   Recent Labs     06/06/21  0530 06/07/21  0300 06/08/21  0345    143 143   K 3.6 3.3* 3.5    109 108   CO2 26 25 25   BUN 17 17 16   CREATININE 0.9 0.9 0.9   CALCIUM 9.3 8.6* 8.4*     CBC:   Recent Labs     06/06/21  0530 06/07/21  0300 06/08/21  0345   WBC 17.8* 16.2* 16.5*   HGB 11.1* 10.4* 10.5*   HCT 34.3* 31.7* 32.0*   MCV 95.8 96.6 96.7    252 248     LIVER PROFILE: No results for input(s): AST, ALT, LIPASE, BILIDIR, BILITOT, ALKPHOS in the last 72 hours. Invalid input(s): AMYLASE,  ALB  PT/INR: No results for input(s): PROTIME, INR in the last 72 hours. APTT: No results for input(s): APTT in the last 72 hours. BNP:  No results for input(s): BNP in the last 72 hours. Ionized Calcium:No results for input(s): IONCA in the last 72 hours. Magnesium:  Recent Labs     06/07/21 0300 06/08/21  0345   MG 1.5* 1.4*     Phosphorus:No results for input(s): PHOS in the last 72 hours. HgbA1C: No results for input(s): LABA1C in the last 72 hours. Hepatic: No results for input(s): ALKPHOS, ALT, AST, PROT, BILITOT, BILIDIR, LABALBU in the last 72 hours. Lactic Acid: No results for input(s): LACTA in the last 72 hours. Troponin: No results for input(s): CKTOTAL, CKMB, TROPONINT in the last 72 hours. ABGs: No results for input(s): PH, PCO2, PO2, HCO3, O2SAT in the last 72 hours. CRP:  No results for input(s): CRP in the last 72 hours. Sed Rate:  No results for input(s): SEDRATE in the last 72 hours. Cultures:   No results for input(s): CULTURE in the last 72 hours. No results for input(s): BC, Elige De La Cruz in the last 72 hours. No results for input(s): CXSURG in the last 72 hours. Radiology reports as per the Radiologist  Radiology: XR CHEST PORTABLE    Result Date: 5/30/2021  Exam: XR CHEST PORTABLE - 5/30/2021 10:34 AM Indication: Leukocytosis Comparison: None available. Findings: Cardiomediastinal silhouette is within normal limits.  No pleural effusion, pneumothorax, or focal consolidation. No acute osseous findings. Surgical clips in the LEFT upper abdomen. No acute findings. Signed by Dr Marlene Hedrick on 5/30/2021 11:54 AM    VL DUP CAROTID BILATERAL    Result Date: 5/31/2021  Vascular Carotid Procedure  Demographics   Patient Name    Fariba Winston Age                   63   Patient Number  [id number]       Gender                Female   Visit Number    [id number]    Interpreting          Ondina Navarro MD                               Physician   Date of Birth   1955   Referring Physician   Junie Bernstein   Accession       [id number]   [address], [place], [address]  Number  Procedure Type of Study:   Cerebral:Carotid, VL CAROTID BILATERAL. Indications for Study:Stroke. Risk Factors   - The patient's risk factor(s) include: diabetes mellitus and arterial     hypertension.   - The patient has a former tobacco history. Impression   Duplex sonography with color flow enhancement was performed bilaterally on  the cervical carotid system. No evidence of hemodynamically significant  stenosis in the bilateral carotid and vertebral arteries. Signature   ----------------------------------------------------------------  Electronically signed by Ondina Navarro MD(Interpreting  physician) on 05/31/2021 06:32 AM  ----------------------------------------------------------------  Blood Pressure:Right arm 136/82 mmHg. Left arm 134/80 mmHg. Velocities are measured in cm/s ; Diameters are measured in mm Carotid Right Measurements +------------+-------+-------+--------+-------+------------+---------------+ ! Location    ! PSV    ! EDV    ! Angle   ! RI     !%Stenosis   ! Tortuosity     ! +------------+-------+-------+--------+-------+------------+---------------+ ! Prox CCA    !88     !27.5   !60      !0.69   !            !               ! +------------+-------+-------+--------+-------+------------+---------------+ ! Mid CCA     !77     !25.1   ! 60      !0.67   ! !               ! +------------+-------+-------+--------+-------+------------+---------------+ ! Prox ICA    !63.6   !23.6   ! 60      !0.63   !            !               ! +------------+-------+-------+--------+-------+------------+---------------+ ! Mid ICA     !72.3   !30.6   ! 60      !0.58   !            !               ! +------------+-------+-------+--------+-------+------------+---------------+ ! Dist ICA    !83.3   !25.9   !60      !0.69   !            !               ! +------------+-------+-------+--------+-------+------------+---------------+ ! Prox ECA    !67.6   !14.9   !60      !0.78   !            !               ! +------------+-------+-------+--------+-------+------------+---------------+ ! Vertebral   !65.3   !19.7   !56      !0.7    ! !               ! +------------+-------+-------+--------+-------+------------+---------------+   - There is antegrade vertebral flow noted on the right side. - Additional Measurements:ICAPSV/CCAPSV 0.95. ICAEDV/CCAEDV 1.11. Carotid Left Measurements +------------+-------+-------+--------+-------+------------+---------------+ ! Location    ! PSV    ! EDV    ! Angle   ! RI     !%Stenosis   ! Tortuosity     ! +------------+-------+-------+--------+-------+------------+---------------+ ! Prox CCA    !109    !26.7   ! 60      !0.76   !            !               ! +------------+-------+-------+--------+-------+------------+---------------+ ! Mid CCA     !77     !25.9   !60      !0.66   !            !               ! +------------+-------+-------+--------+-------+------------+---------------+ ! Prox ICA    !53.4   !20.8   !60      !0.61   !            !               ! +------------+-------+-------+--------+-------+------------+---------------+ ! Mid ICA     ! 85     !27.6   ! 60      !0.68   !            !               ! +------------+-------+-------+--------+-------+------------+---------------+ ! Dist ICA    !56.3   !23.5   !60      !0.58   !            ! ! +------------+-------+-------+--------+-------+------------+---------------+ ! Prox ECA    !90.4   !18.9   !60      !0.79   !            !               ! +------------+-------+-------+--------+-------+------------+---------------+ ! Vertebral   !46     !17.7   ! 60      !0.62   !            !               ! +------------+-------+-------+--------+-------+------------+---------------+   - There is antegrade vertebral flow noted on the left side. - Additional Measurements:ICAPSV/CCAPSV 0.78. ICAEDV/CCAEDV 1.03. MRI BRAIN WO CONTRAST    Result Date: 5/29/2021  Exam: MRI BRAIN WO CONTRAST - 5/29/2021 11:31 AM Indication: Stroke like symptoms Comparison: None available. Findings: Small acute infarcts in the RIGHT periventricular white matter and RIGHT putamen. There is some matching T2 FLAIR hyperintense signal. No increased susceptibility to suggest acute or chronic hemorrhage. Major physiologic flow voids are maintained. RIGHT frontal, LEFT parietal, and RIGHT occipital areas of encephalomalacia likely representing old infarcts. Chronic microvascular ischemic white matter change. Global cerebral volume loss. No midline shift or mass effect. Lateral ventricles are nondilated. Basilar cisterns are patent. Sella turcica and its contents appear unremarkable. No acute orbital finding. Mastoid air cells and paranasal sinuses are clear. 1.  Small acute infarcts in the RIGHT periventricular white matter and RIGHT basal ganglia. 2.  Multiple bilateral remote infarcts.  Signed by Dr Deo Khalil on 5/29/2021 2:43 PM    ECHO 2D WO COLOR DOPPLER COMPLETE    Result Date: 5/29/2021  Transthoracic Echocardiography Report (TTE)  Demographics   Patient Name  Neno Morning Date of Study         05/29/2021   MRN           [id number]       Gender                Female   Date of Birth 1955   Room Number           AZJ-7960   Age           63 year(s)   Height:       65 inches    Referring Physician   Eden Burentt MD Weight:       220 pounds   Sonographer           Loly Beck RDCS   BSA:          2.06 m^2     Interpreting          Karla William MD                             Physician   BMI:          36.61 kg/m^2  Procedure Type of Study   TTE procedure:ECHOCARDIOGRAM COMPLETE 2D WO COLOR DOPPLER. Study Location: Echo Lab Technical Quality: Adequate visualization Patient Status: Inpatient Contrast Medium: Bubble Study. Indications:Patent foramen ovale (PFO). Conclusions   Summary  Normal size left atrium. positive bubble study  Normal right atrial dimension with no evidence of thrombus or mass noted. Normal right ventricular size with preserved RV function. Normal right ventricular size with preserved RV function. pfo present  Normal right ventricular size with preserved RV function. pfo present. large shunt. aneurysmal ias. No evidence of significant pericardial effusion is noted. No evidence of pleural effusion. Structurally normal mitral valve with normal leaflet mobility. No evidence  of mitral valve stenosis or mild mitral regurgitation. Aortic valve appears to be tricuspid. Structurally normal aortic valve. No significant aortic regurgitation or stenosis is noted. Tricuspid valve is structurally normal.  No evidence of tricuspid regurgitation. Signature   ----------------------------------------------------------------  Electronically signed by Karla William MD(Interpreting  physician) on 05/29/2021 03:38 PM  ----------------------------------------------------------------   Findings   Mitral Valve  Structurally normal mitral valve with normal leaflet mobility. No evidence  of mitral valve stenosis or mild mitral regurgitation. Aortic Valve  Aortic valve appears to be tricuspid. Structurally normal aortic valve. No significant aortic regurgitation or stenosis is noted. Tricuspid Valve  Tricuspid valve is structurally normal.  No evidence of tricuspid regurgitation.    Left Atrium  Normal size left atrium. positive bubble study   Left Ventricle  Normal left ventricular size with preserved LV function and an estimated  ejection fraction of approximately 55-60%. No evidence of left ventricular mass or thrombus noted. Right Atrium  Normal right atrial dimension with no evidence of thrombus or mass noted. Right Ventricle  Normal right ventricular size with preserved RV function. pfo present. large shunt. aneurysmal ias. Pericardial Effusion  No evidence of significant pericardial effusion is noted. Pleural Effusion  No evidence of pleural effusion. M-Mode Measurements (cm)   LVIDd: 4.45 cm                       LVIDs: 3.29 cm  IVSd: 1.01 cm  LVPWd: 1.02 cm                       AO Root Dimension: 3.1 cm  % Ejection Fraction: 51 %            LA: 3 cm                                       LVOT: 1.9 cm  Doppler Measurements:   AV Peak Velocity:113 cm/s           MV Peak E-Wave: 50.4 cm/s  AV Peak Gradient: 5.11 mmHg         MV Peak A-Wave: 61.4 cm/s                                      MV E/A Ratio: 0.82 %                                      MV Peak Gradient: 1.02 mmHg         Assessment     Right hemisphere, cerebral infarction ischemic. Continue supportive care. Will ask  to look at skilled nursing placement.     Status post PFO closure. Continue supportive care.     Persistent mental status change. Multifactorial.  Supportive care. Neurology following.       Maia Mendoza DO

## 2021-06-08 NOTE — CARE COORDINATION
SW received a consult for skilled nursing placement. Whether Pt does SNF or inpatient rehab, she will need to have a nutrition source before sending said referrals.    Electronically signed by Clary Murcia on 6/8/2021 at 8:25 AM

## 2021-06-08 NOTE — PROCEDURES
RISHIYO MEJIA [facility] [address] [facility]                          ELECTROENCEPHALOGRAM REPORT    PATIENT NAME: Stanton Cherry                       :        1955  MED REC NO:   [id number]                              ROOM:       [facility]  ACCOUNT NO:   [de-identified]                           ADMIT DATE: 2021  PROVIDER:     Hoda Sun MD    DATE OF EE2021  Indication for test: Mental status changes  SUMMARY:  The background initially consists of a rhythmic 1 to 2 Hz  triphasic activity which is maximal in bifrontal regions. At times,  this organizes and becomes more rhythmic. Following IV Ativan, this  activity appears to temporarily attenuate and a 6 to 7 Hz activity is  noted. This rhythmic high-amplitude triphasic activity becomes  prominent again at the end of the tracing. IMPRESSION:  Abnormal EEG which may reflect electrographic seizure  versus toxic/metabolic encephalopathy. Correlate clinically.         So Tapia MD    D: 2021 12:06:23      T: 2021 12:47:05     [id number]  Job#: [id number]     Doc#: [id number]    CC:

## 2021-06-08 NOTE — PROGRESS NOTES
Physical Therapy    Facility/Department: [facility] SURG SERVICES  Re-Assessment after closure of PFO on 21    NAME: Jewel Ribeiro  : 1955  MRN: [id number]    Date of Service: 2021    Discharge Recommendations:  Continue to assess pending progress, 24 hour supervision or assist, Patient would benefit from continued therapy after discharge        Assessment   Body structures, Functions, Activity limitations: Decreased functional mobility ; Decreased ROM; Decreased strength;Decreased sensation;Decreased endurance;Decreased balance;Decreased coordination; Increased pain  Assessment: Pt. will benefit from cont. PT to decrease impairments and is currently unsafe to return home to prior living situation with her son at this time. Pt. limited in ability to follow commands. Pt. a fall risk and should not attempt mobility on her own at this time. Pt. needs 24 hr care due to inability to follow commands or participate fully. Pt. does not initiate any movements on her own, but she was able to hold balance with Min A on EOB. Pt. unable to be consoled or communicate with PT. Pt. most likely needs to use leslee steady when able to work on standing with therapy due to weakness, prior knee problems including buckling. Treatment Diagnosis: impaired gait and mobility  Prognosis: Fair  Decision Making: Medium Complexity  PT Education: Plan of Care;General Safety  Patient Education: use of call light for all needs, but pt does not demonstrate learning  Barriers to Learning: confusion, inability to follow commands  REQUIRES PT FOLLOW UP: Yes  Activity Tolerance  Activity Tolerance: Treatment limited secondary to medical complications (free text)  Activity Tolerance: pt crying and moaning during entire session, does not open eyes. Patient Diagnosis(es): There were no encounter diagnoses.      has a past medical history of Cerebral artery occlusion with cerebral infarction [facility]), COPD (chronic obstructive pulmonary disease) ([facility] [address] Diabetes mellitus ([facility] [address] Hypertension, Neuromuscular disorder ([facility] [address] and Palliative care patient. has a past surgical history that includes Cholecystectomy; Appendectomy; Cardiac surgery; Splenectomy; Coronary angioplasty;  section; and Abdomen surgery. Restrictions  Restrictions/Precautions  Restrictions/Precautions: Fall Risk  Required Braces or Orthoses?: No  Vision/Hearing  Vision: Impaired (keeps eyes shut during all of reassessment)     Subjective  General  Chart Reviewed: Yes  Patient assessed for rehabilitation services?: Yes  Additional Pertinent Hx: recurrent stroke sx, she was seen three weeks ago, with right hemiplegia and expressive aphasia and right hand numbness. New finding: patent foramen ovale with atrial septal anuerysm noted and no thrombus  Response To Previous Treatment: Not applicable  Family / Caregiver Present: No  Referring Practitioner: Symone Kenny MD  Referral Date : 21  Diagnosis: recurrent stroke, patent foramen ovale  Follows Commands: Impaired  Other (Comment): pt inconsistently follows commands  General Comment  Comments: RAMON Implant of an Amplatzer PFO Closure Device   Subjective  Subjective: Pt. moaning and crying the whole PT re-assessment. Eyes shut. Pain Screening  Patient Currently in Pain: Yes  Pain Assessment  Pain Assessment: FLACC (6/10 at rest and wtih activity)  Pain Location: Back;Knee (history of back and knee pain)  Pain Orientation: Right;Left  Pain Descriptors: Aching  Functional Pain Assessment: Prevents or interferes some active activities and ADLs  Non-Pharmaceutical Pain Intervention(s): Ambulation/Increased Activity;Repositioned  Response to Pain Intervention: Patient Satisfied  Vital Signs  Patient Currently in Pain: Yes  Pre Treatment Pain Screening  Intervention List: Nurse/physician notified; Patient able to continue with treatment    Orientation  Orientation  Overall Orientation Status: Impaired  Orientation Level: Disoriented to time;Disoriented to situation;Disoriented X4;Disoriented to place  Social/Functional History  Social/Functional History  Lives With: Son  Type of Home: Apartment  Home Layout: One level  Bathroom Shower/Tub: Tub/Shower unit  Bathroom Equipment: Grab bars in shower, Tub transfer bench  Home Equipment: Rolling walker  Receives Help From: Family  ADL Assistance: Needs assistance  Ambulation Assistance: Needs assistance  Additional Comments: reports son is with her all the time. Pt. sleeps on couch. Pt. was able to participate with PT prior to procedure on 6/2/21. Cognition   Cognition  Overall Cognitive Status: Exceptions  Following Commands: Inconsistently follows commands; Does not follow commands  Attention Span: Difficulty attending to directions; Unable to maintain attention  Memory: Decreased recall of precautions;Decreased recall of recent events  Safety Judgement: Decreased awareness of need for safety;Decreased awareness of need for assistance  Problem Solving: Decreased awareness of errors;Assistance required to identify errors made;Assistance required to implement solutions;Assistance required to generate solutions  Insights: Not aware of deficits  Initiation: Requires cues for all  Sequencing: Requires cues for all  Cognition Comment: Moans throughout--appears unchanged in frequency despite various activities or stim at hand. Eyes closed the entire time and began to cry while in sitting. Objective     Observation/Palpation  Posture: Fair (pt leaning to R side and forward while EOB)  Observation: yisel, IV in L groin (RN unhooked). Pt. noted to have incontinence in bowel and bladder in depends.     PROM RLE (degrees)  RLE PROM: WNL  PROM LLE (degrees)  LLE PROM: WNL  Strength RLE  Strength RLE: Exception  Comment: not following commands for MMT other than pumping ankles 1x  Strength LLE  Strength LLE: Exception  Comment: not following commands for MMT other than pumping ankles 1x     Sensation  Overall Sensation Status: Impaired (BLEs with neuropathy)  Bed mobility  Rolling to Right: Maximum assistance;2 Person assistance  Supine to Sit: Maximum assistance;2 Person assistance  Sit to Supine: Minimal assistance;2 Person assistance  Scooting: Dependent/Total;2 Person assistance  Comment:  (pt rolled to R and L several times to have old depends removed, be cleaned up from BM and have new depends donned)  Transfers  Sit to Stand: Maximum Assistance;2 Person Assistance  Stand to sit: Maximum Assistance;2 Person Assistance  Bed to Chair: Unable to assess  Comment: pt able to clear hips off of bed to put new bed pad down, but unable to fully stand. Ambulation  Ambulation?: No  Therapeutic ex: PROM BLEs AP, HS, hip abd/add x 10 in supine     Balance  Posture: Poor  Sitting - Static: +;Poor  Sitting - Dynamic: Poor;+  Standing - Static: Poor;-  Standing - Dynamic: -;Poor        Plan   Plan  Times per week: 3-7  Times per day: Daily  Plan weeks: 2  Current Treatment Recommendations: Strengthening, ROM, Balance Training, Functional Mobility Training, Transfer Training, Endurance Training, Gait Training, Safety Education & Training, Positioning, Equipment Evaluation, Education, & procurement, Patient/Caregiver Education & Training  Plan Comment: cont. PT per POC.   Safety Devices  Type of devices: Bed alarm in place, Call light within reach, Left in bed, Nurse notified, Gait belt, Patient at risk for falls    G-Code       OutComes Score                                                  AM-PAC Score             Goals  Short term goals  Time Frame for Short term goals: 2 wks  Short term goal 1: supine to sit indep  Short term goal 2: sit to stand indep  Short term goal 3: amb. 25' with RW SBA  Patient Goals   Patient goals : no stated       Therapy Time   Individual Concurrent Group Co-treatment   Time In           Time Out           Minutes                   Nuno Estrada PT    Electronically signed by Yusra Carreno Jamie Espitia on 6/8/2021 at 4:48 PM

## 2021-06-08 NOTE — PROGRESS NOTES
Comprehensive Nutrition Assessment    Type and Reason for Visit:  Reassess     Nutrition Assessment:  Pt has been NPO for 6 days. Recommend to initiate JOSÉ at this time to help meet nutritional needs. Malnutrition Assessment:  Malnutrition Status:   At risk for malnutrition (Comment)    Context:  Acute Illness     Findings of the 6 clinical characteristics of malnutrition:  Energy Intake:  7 - 50% or less of estimated energy requirements for 5 or more days  Weight Loss:  No significant weight loss     Body Fat Loss:  No significant body fat loss     Muscle Mass Loss:  No significant muscle mass loss    Fluid Accumulation:  No significant fluid accumulation     Strength:  Not Performed    Estimated Daily Nutrient Needs:  Energy (kcal):  5418-0555 kcals/day; Weight Used for Energy Requirements:  Current (11-14)     Protein (g):  114 g/PRO/day; Weight Used for Protein Requirements:  Ideal (2.0)        Fluid (ml/day):  8901-9177 mL/day; Method Used for Fluid Requirements:  1 ml/kcal         Current Nutrition Therapies:    Diet NPO Exceptions are: Sips with Meds    Anthropometric Measures:  · Height: 5' 5\" (165.1 cm)  · Current Body Weight: 222 lb (100.7 kg)      · Ideal Body Weight: 125 lbs  · BMI: 36.9   · BMI Categories: Obese Class 2 (BMI 35.0 -39.9)       Nutrition Diagnosis:   · Inadequate oral intake related to acute injury/trauma as evidenced by NPO or clear liquid status due to medical condition    Nutrition Interventions:   Food and/or Nutrient Delivery:  Continue NPO, Start Tube Feeding  Coordination of Nutrition Care:  Continue to monitor while inpatient    Goals:  Pt will progress to an oral diet to meet nutritional needs or needs will be met with JOSÉ       Nutrition Monitoring and Evaluation:   Food/Nutrient Intake Outcomes:  Diet Advancement/Tolerance  Physical Signs/Symptoms Outcomes:  Biochemical Data, Nutrition Focused Physical Findings, Weight     Electronically signed by Dustin Hamilton MS, DMITRY, HECTOR on 6/8/21 at 10:55 AM CDT    Contact: [phone]

## 2021-06-08 NOTE — PROGRESS NOTES
Occupational Therapy  Facility/Department: [facility] SURG SERVICES  Re Assessment/Daily Treatment Note  NAME: Sarika Ramirez  : 1955  MRN: [id number]    Date of Service: 2021    Discharge Recommendations:  Patient would benefit from continued therapy after discharge       Assessment   Assessment: Reassessment completed now that patient is s/p PFO closure on . The patient is significantly impaired for cognition/ADL/mobility. Treatment Diagnosis: Cerebral infarction, Patent Foramen ovale and atrial septal aneurysm s/p repair  History: multiple bilateral strokes  REQUIRES OT FOLLOW UP: Yes  Safety Devices  Safety Devices in place: Yes  Type of devices: Call light within reach; Left in bed;Bed alarm in place         Patient Diagnosis(es): There were no encounter diagnoses. has a past medical history of Cerebral artery occlusion with cerebral infarction [facility]), COPD (chronic obstructive pulmonary disease) ([facility] [address] Diabetes mellitus ([facility] [address] Hypertension, Neuromuscular disorder ([facility] [address] and Palliative care patient. has a past surgical history that includes Cholecystectomy; Appendectomy; Cardiac surgery; Splenectomy; Coronary angioplasty;  section; and Abdomen surgery. Restrictions  Restrictions/Precautions  Restrictions/Precautions: Fall Risk  Required Braces or Orthoses?: No  Subjective   General  Chart Reviewed: Yes  Patient assessed for rehabilitation services?: Yes    Family / Caregiver Present: No  Pain Assessment  Pain Assessment: FLACC  Pain Location: Generalized (hx of back pain and knee pain)      21 1541   General   Chart Reviewed Yes   Patient assessed for rehabilitation services?  Yes   Response to previous treatment    Family / Caregiver Present No   Pain Assessment   Pain Assessment FLACC   Pain Location Generalized  (hx of back pain and knee pain)   Pain Assessment/FLACC   Pain Rating: FLACC (rest) - Face 0   Pain Rating: FLACC (rest) - Legs 0   Pain Rating: FLACC (rest) - Activity 0 Pain Rating: FLACC (rest) - Cry 2   Pain Rating: FLACC (rest) - Consolability 2   Score: FLACC (rest) 4   Pain Rating: FLACC (activity) - Face 0   Pain Rating: FLACC (activity) - Legs 0   Pain Rating: FLACC (activity) 0   Pain Rating: FLACC (activity) - Cry 2   Pain Rating: FLACC (activity) - Consolability 2  (nurse present for portion of treatment and aware of pain issues)   Score: FLACC (activity) 4    Pain management strategies:  Increase activity, position changes . No change in demeanor. Nurse notified  Orientation     Objective    ADL  Feeding: Dependent/Total  Grooming: Dependent/Total  UE Bathing: Dependent/Total  LE Bathing: Dependent/Total  UE Dressing: Dependent/Total  LE Dressing: Dependent/Total  Toileting: Dependent/Total        Balance  Sitting Balance: Minimal assistance (after set up. Does present with some trunk stability on an automatic level)  Standing Balance  Activity: Attempted to stand briefly for linen exchange--unable to get fully upright with assist of two  Toilet Transfers  Toilet Transfers Comments: Recommend maxi move  Bed mobility  Rolling to Right: Maximum assistance  Supine to Sit: Maximum assistance  Transfers  Transfer Comments: Recommend the maxi move for transfers                       Cognition  Cognition Comment: Moans throughout--appears unchanged in frequency despite various activities or stim at hand.  Eyes closed the entire tx           Positioning  Bed Postion Comment: heels off loaded, arms supported, HOB up        Type of ROM/Therapeutic Exercise  Comment: Observe and palpate some active movement in both arms with facilitation--left appears stronger than right  LUE PROM (degrees)  LUE PROM: WNL  RUE PROM (degrees)  RUE PROM: WNL                 Plan   Plan  Times per week: 2-3  G-Code     OutComes Score                                                  AM-PAC Score             Goals  Short term goals  Short term goal 1: The patient will consistently make meaningful responses during ADL and mobility       Therapy Time   Individual Concurrent Group Co-treatment   Time In           Time Out           Minutes  [id number] [place] OT Electronically signed by Maria Elena Patiño OT on 6/8/2021 at 4:06 PM

## 2021-06-08 NOTE — CARE COORDINATION
The [address] at [address] [facility]  Notification of Admission Decision      [] Patient has been accepted for admit to [facility] on :       Please write discharge orders and summary prior to discharge. [] Patient acceptance to Rehab pending the following :    [] Eval in progress       [x] Patient determined to be ineligible for services at [facility] because : unfortunately patient's mental status has not improved. Lies in bed with eyes closed and moans. I have cancelled her IPR request with her insurance and notified CHRIS Pak to work on SNF. We recommend you consider: SNF        Thank you for your referral, we appreciate you. If you have any questions, please feel   free to contact me at [phone].     Electronically Signed by Jazmine Castro Admissions Coordinator 6/8/2021 8:38 AM

## 2021-06-08 NOTE — PROGRESS NOTES
Speech Language Pathology  Facility/Department: [facility] SURG SERVICES    NAME: Cristopher Reyes  : 1955  MRN: [id number]     Patient with continued decreased responsiveness. Speech therapy to subsequently sign-off at this time. Consider GI Consult with PEG tube placement. Please re consult if medical status changes.      Electronically signed by ANNABELLA Augustine on 2021 at 1:43 PM    .

## 2021-06-08 NOTE — PROGRESS NOTES
[id number] [facility] Neurology  [address] [place] [address] Drive, [address]  [facility], [facility] [address]  Phone [phone]     Neurology Progress Note  2021 5:17 PM  Information:   Patient Name: Tram Jackson  :   1955  Age:   63 y.o. MRN:   [id number]  Account #:  [de-identified]  Admit Date:   2021  Today:  21     ADMIT DX:   Right hemisphere, cerebral infarction [facility])    Subjective:     Shaji Ayala a 63y.o. year old woman with a history of hypertension, hyperlipidemia, diabetes, cardiovascular disease, and previous strokes who was transferred from [facility] ER  after having yet another stroke with left sided weakness. She recovered fairly well and had a PFO closure on . That evening she became minimally responsive. Interval History:   No changes. She remains largely obtunded. She moans or cries when moved or touched. She does not open eyes or follow commands. Objective:     Past Medical History:  Past Medical History:   Diagnosis Date    Cerebral artery occlusion with cerebral infarction ([facility]     COPD (chronic obstructive pulmonary disease) (HCC)     Diabetes mellitus ([facility]     Hypertension     Neuromuscular disorder ([facility]     Palliative care patient 2021       Past Surgical History:   Procedure Laterality Date    ABDOMEN SURGERY      gastric resection    APPENDECTOMY      CARDIAC SURGERY       SECTION      x2    CHOLECYSTECTOMY      CORONARY ANGIOPLASTY      SPLENECTOMY         No family history on file. Social History     Socioeconomic History    Marital status:       Spouse name: Not on file    Number of children: Not on file    Years of education: Not on file    Highest education level: Not on file   Occupational History    Not on file   Tobacco Use    Smoking status: Former Smoker     Packs/day: 1.00     Years: 28.00     Pack years: 28.00     Types: Cigarettes     Quit date: 2019     Years since quittin.4    Smokeless tobacco: Never Used   Vaping Use    Vaping Use: Never used   Substance and Sexual Activity    Alcohol use: Not Currently    Drug use: Never    Sexual activity: Not Currently   Other Topics Concern    Not on file   Social History Narrative    Not on file     Social Determinants of Health     Financial Resource Strain:     Difficulty of Paying Living Expenses:    Food Insecurity:     Worried About Running Out of Food in the Last Year:     [address] in the Last Year:    Transportation Needs:     Lack of Transportation (Medical):      Lack of Transportation (Non-Medical):    Physical Activity:     Days of Exercise per Week:     Minutes of Exercise per Session:    Stress:     Feeling of Stress :    Social Connections:     Frequency of Communication with Friends and Family:     Frequency of Social Gatherings with Friends and Family:     Attends [employer] Services:     Active Member of Clubs or Organizations:     Attends Club or Organization Meetings:     Marital Status:    Intimate Partner Violence:     Fear of Current or Ex-Partner:     Emotionally Abused:     Physically Abused:     Sexually Abused:        Medications:   sodium chloride 100 mL/hr at 06/08/21 0341    sodium chloride      dextrose        valproate sodium (DEPACON) IVPB  1,000 mg Intravenous Q8H    lacosamide (VIMPAT) IVPB  100 mg Intravenous BID    fentaNYL  1 patch Transdermal Q72H    ticagrelor  90 mg Oral BID    metoprolol  5 mg Intravenous Q6H    sodium chloride flush  5-40 mL Intravenous 2 times per day    bisacodyl  10 mg Rectal Daily    magnesium hydroxide  30 mL Oral Daily    aspirin  81 mg Oral Daily    insulin lispro  0-6 Units Subcutaneous TID WC    insulin lispro  0-3 Units Subcutaneous Nightly       Diagnostic Studies:  Reviewed all labs/diagnositcs since last 24hrs:  Recent Results (from the past 24 hour(s))   POCT Glucose    Collection Time: 06/07/21  8:42 PM   Result Value Ref Range    POC Glucose 97 70 - 99 mg/dl    Performed on AccuChek    Basic Metabolic Panel w/ Reflex to MG    Collection Time: 06/08/21  3:45 AM   Result Value Ref Range    Sodium 143 136 - 145 mmol/L    Potassium reflex Magnesium 3.5 3.5 - 5.0 mmol/L    Chloride 108 98 - 111 mmol/L    CO2 25 22 - 29 mmol/L    Anion Gap 10 7 - 19 mmol/L    Glucose 100 74 - 109 mg/dL    BUN 16 8 - 23 mg/dL    CREATININE 0.9 0.5 - 0.9 mg/dL    GFR Non-African American >60 >60    GFR African American >59 >59    Calcium 8.4 (L) 8.8 - 10.2 mg/dL   CBC    Collection Time: 06/08/21  3:45 AM   Result Value Ref Range    WBC 16.5 (H) 4.8 - 10.8 K/uL    RBC 3.31 (L) 4.20 - 5.40 M/uL    Hemoglobin 10.5 (L) 12.0 - 16.0 g/dL    Hematocrit 32.0 (L) 37.0 - 47.0 %    MCV 96.7 81.0 - 99.0 fL    MCH 31.7 (H) 27.0 - 31.0 pg    MCHC 32.8 (L) 33.0 - 37.0 g/dL    RDW 13.8 11.5 - 14.5 %    Platelets 716 869 - 074 K/uL    MPV 11.1 9.4 - 12.3 fL   Valproic acid level, total    Collection Time: 06/08/21  3:45 AM   Result Value Ref Range    Valproic Acid Lvl 82.2 50.0 - 100.0 ug/mL   Magnesium    Collection Time: 06/08/21  3:45 AM   Result Value Ref Range    Magnesium 1.4 (L) 1.6 - 2.4 mg/dL   POCT Glucose    Collection Time: 06/08/21  6:45 AM   Result Value Ref Range    POC Glucose 98 70 - 99 mg/dl    Performed on AccuChek    POCT Glucose    Collection Time: 06/08/21  3:58 PM   Result Value Ref Range    POC Glucose 99 70 - 99 mg/dl    Performed on AccuChek      Narrative   Examination. CT HEAD WO CONTRAST 6/8/2021 11:38 AM   History: Left hemisphere stroke. DLP: 559 mGycm. The CT scan of the head is performed without intravenous contrast   enhancement. The images are acquired in axial plane with subsequent   reconstruction in coronal and sagittal planes. The comparison is made with the previous study dated 6/3/2021. The scattered areas of decreased attenuation in the right frontal   lobe, right paraventricular white matter and left frontal   periventricular white matter are reidentified.  These represent areas   of subacute or chronic ischemia/infarction and there is no change in   the previous study. No new lesions are seen. There is no evidence of intracranial hemorrhage or hematoma. There is   no evidence of mass or midline shift. Moderately prominent ventricles,   basal cistern and the cortical sulci are similar to the previous study   suggestive chronic volume loss. Gray-white matter differentiation is maintained. The images reviewed in bone window show no acute bony abnormality. There is mild mucosal thickening involving the left sphenoid sinus.       Impression   No acute intracranial abnormality. Areas of subacute or chronic ischemia/infarction bilaterally as   detailed above. No change. Signed by Dr Anna Shepherd on 6/8/2021 1:08 PM       Diet:  Diet NPO Exceptions are: Sips with Meds    Examination:  Vitals: BP (!) 187/94   Pulse 84   Temp 96.4 °F (35.8 °C) (Temporal)   Resp 16   Ht 5' 5\" (1.651 m)   Wt 222 lb 4.8 oz (100.8 kg)   SpO2 98%   BMI 36.99 kg/m²      Intake/Output Summary (Last 24 hours) at 6/8/2021 1717  Last data filed at 6/8/2021 1017  Gross per 24 hour   Intake 1327 ml   Output 1250 ml   Net 77 ml     General appearance: She is obtunded and moaning. HEENT: Exam; heent: Head: Normal, normocephalic, atraumatic. No nuchal rigidity  Lungs: clear to auscultation bilaterally  Heart: regular rate and rhythm, S1, S2 normal, no murmur, click, rub or gallop  Abdomen: soft, non-tender; bowel sounds normal; no masses,  no organomegaly  Extremities: extremities normal, atraumatic, no cyanosis or edema  Neurologic:  Obtunded.  Moaning. No response to voice.  She starts crying with light stimulation or passive head or limb movement.  She does not open eyes, follow commands, or answer questions.  She does not talk.  No facial asymmetry noted.  PERRL. She blinks to threat.   She moves both arms slowly and both lower extremities.  She localizes and with and withdraws the left arm. Suma Delcid moves both legs purposefully.      Assessment:   1. Encephalopathy. CTs, MRIs, EEGs, labs unrevealing for etiology. Anticonvulsants, low dose opioid (?withdrawal) not helpful. .    2. Recent right hemisphere strokes with history of left and right hemisphere strokes convincing for cardioembolism.  The P2Y12 plt inh test indicates she is a Plavix nonresponder   3. PFO with septal aneurysm s/p closure device placement  4. Hypertension  5. Diabetes  6. Myelodysplasia with chronic leukocytosis    Plan:   1. Although thought low yield as no etiology identified, will pursue LP tomorrow. 2. Add Cerebyx       Discharge planning: To be determined    Reviewed treatment plans with the patient and/or family. 35 minutes spent in face to face interaction and coordination of care.      Electronically signed by Rex Johnson MD on 6/8/2021 at 5:17 PM

## 2021-06-09 NOTE — CARE COORDINATION
CHRIS set up transport for Pt son on Thursday June 10th at 10:00 AM with [facility] [employer]. CHRIS will drop off patient mission fund sheet for the trip to [place] to [address] [place] [address]. CHRIS will have another patient mission fund sheet available for Pt transport back home. The trip to and from will be $108 each way. Please call son in the morning along with [employer] to make sure the trip is still good to go. Palliative Care would like to be notified when Pt son gets here. Their extension is [phone].      Electronically signed by Colby Musa on 6/9/2021 at 3:43 PM

## 2021-06-09 NOTE — PROGRESS NOTES
Feeling of Stress :    Social Connections:     Frequency of Communication with Friends and Family:     Frequency of Social Gatherings with Friends and Family:     Attends [employer] Services:     Active Member of Clubs or Organizations:     Attends Club or Organization Meetings:     Marital Status:    Intimate Partner Violence:     Fear of Current or Ex-Partner:     Emotionally Abused:     Physically Abused:     Sexually Abused:        Current Facility-Administered Medications   Medication Dose Route Frequency Provider Last Rate Last Admin    valproate (DEPACON) 1,000 mg in dextrose 5 % 100 mL IVPB  1,000 mg Intravenous Q12H Teetee Weeks MD        fosphenytoin (CEREBYX) 600 mg PE in dextrose 5 % 100 mL IVPB (loading dose)  600 mg PE Intravenous Once Teetee Weeks MD        fosphenytoin (CEREBYX) injection 100 mg PE  100 mg PE Intravenous Q8H Teetee Weeks MD   100 mg PE at 06/09/21 0239    lacosamide (VIMPAT) 100 mg in dextrose 5 % 60 mL IVPB  100 mg Intravenous BID Genoveva Polanco MD   Stopped at 06/09/21 1105    [Held by provider] ticagrelor (BRILINTA) tablet 90 mg  90 mg Oral BID Sadaf Elizondo MD   90 mg at 06/04/21 1144    LORazepam (ATIVAN) injection 0.5 mg  0.5 mg Intravenous Q4H PRN Genoveva Polanco MD   0.5 mg at 06/06/21 0852    metoprolol (LOPRESSOR) injection 5 mg  5 mg Intravenous Q6H Bridgett Mccoy DO   5 mg at 06/09/21 2664    hydrALAZINE (APRESOLINE) injection 5 mg  5 mg Intravenous Q4H PRN Jose Alberto Owens MD   5 mg at 06/04/21 1706    0.9 % sodium chloride infusion   Intravenous Continuous Jose Alberto Owens  mL/hr at 06/08/21 0341 New Bag at 06/08/21 0341    perflutren lipid microspheres (DEFINITY) injection 1.65 mg  1.5 mL Intravenous ONCE PRN Horace Monico, APRN - CNP        sodium chloride flush 0.9 % injection 5-40 mL  5-40 mL Intravenous 2 times per day Horace Monico, APRN - CNP   10 mL at 06/08/21 2215    sodium glucagon (rDNA) injection 1 mg  1 mg Intramuscular PRN Ion Jones MD        dextrose 5 % solution  100 mL/hr Intravenous PRN Ion Jones MD             sodium chloride 100 mL/hr at 06/08/21 0341    sodium chloride      dextrose          Objective:   BP (!) 140/78   Pulse 67   Temp 96.5 °F (35.8 °C) (Temporal)   Resp 20   Ht 5' 5\" (1.651 m)   Wt 222 lb 4.8 oz (100.8 kg)   SpO2 99%   BMI 36.99 kg/m²     General: mild distress  HEENT: normocephalic, atraumatic  Neck: supple, symmetrical, trachea midline   Lungs: clear to auscultation bilaterally   Cardiovascular: s1 and s2 normal  Abdomen: soft, positive bowel sounds  Extremities: no edema or cyanosis   Neuro: encephalopathic    Recent Labs     06/07/21 0300 06/08/21 0345 06/09/21  0455   WBC 16.2* 16.5* 13.6*   RBC 3.28* 3.31* 3.26*   HGB 10.4* 10.5* 10.5*   HCT 31.7* 32.0* 31.4*   MCV 96.6 96.7 96.3   MCH 31.7* 31.7* 32.2*   MCHC 32.8* 32.8* 33.4    248 236     Recent Labs     06/07/21 0300 06/08/21 0345 06/09/21  0455    143 140   K 3.3* 3.5 3.2*   ANIONGAP 9 10 10    108 106   CO2 25 25 24   BUN 17 16 14   CREATININE 0.9 0.9 0.8   GLUCOSE 100 100 108   CALCIUM 8.6* 8.4* 8.5*     Recent Labs     06/07/21 0300 06/08/21 0345 06/09/21  0455   MG 1.5* 1.4* 1.4*     No results for input(s): AST, ALT, ALB, BILITOT, ALKPHOS, ALB in the last 72 hours. No results for input(s): PH, PO2, PCO2, HCO3, BE, O2SAT in the last 72 hours. No results for input(s): TROPONINI in the last 72 hours. Recent Labs     06/09/21  1043   INR 1.14     No results for input(s): LACTA in the last 72 hours. Intake/Output Summary (Last 24 hours) at 6/9/2021 1135  Last data filed at 6/9/2021 0010  Gross per 24 hour   Intake 560 ml   Output 250 ml   Net 310 ml       CT Head WO Contrast    Result Date: 6/8/2021  Examination. CT HEAD WO CONTRAST 6/8/2021 11:38 AM History: Left hemisphere stroke. DLP: 559 mGycm.  The CT scan of the head is performed without intravenous contrast enhancement. The images are acquired in axial plane with subsequent reconstruction in coronal and sagittal planes. The comparison is made with the previous study dated 6/3/2021. The scattered areas of decreased attenuation in the right frontal lobe, right paraventricular white matter and left frontal periventricular white matter are reidentified. These represent areas of subacute or chronic ischemia/infarction and there is no change in the previous study. No new lesions are seen. There is no evidence of intracranial hemorrhage or hematoma. There is no evidence of mass or midline shift. Moderately prominent ventricles, basal cistern and the cortical sulci are similar to the previous study suggestive chronic volume loss. Gray-white matter differentiation is maintained. The images reviewed in bone window show no acute bony abnormality. There is mild mucosal thickening involving the left sphenoid sinus. No acute intracranial abnormality. Areas of subacute or chronic ischemia/infarction bilaterally as detailed above. No change.  Signed by Dr Raul Hauser on 6/8/2021 1:08 PM       Assessment and Plan:   Persistent encephalopathy  MRI brain 5/29/2021: Right-sided small acute infarcts, multiple bilateral remote infarcts  Neurology following  Extensive work-up in progress  S/p PFO closure 6/2/2021 per Dr. Flako Pérez    DM2  Meds on board    DVT prophylaxis  SCDs    Vernida Osler, MD   6/9/2021 11:35 AM

## 2021-06-09 NOTE — ADT AUTH CERT
Marlyn Arias #[id number] ([id number]) (:1955 63 y.o. F) (Adm: 21)  [facility] 5 [id number]  PCP    Del Foote  Demographics  Comment     Last edited by  on  at    Address: Home Phone: Work Phone: Mobile Phone:   [address] [address]    [phone] -- [phone]   SSN: Insurance: Marital Status: Episcopal:    [facility] MEDICARE  (none)   Admission Information    Arrival Date/Time:  Admit Date/Time: 2021 8875 IP Adm. Date/Time: 2021 0212   Admission Type: Elective Point of Origin: Hospital ([address]) Admit Category:    Means of Arrival:  Primary Service: Med/surg Secondary Service: N/A   Transfer Source:  Service Area: [facility]) Unit: [facility] Surg Services   Admit Provider: Taryn Foy DO Attending Provider: Bettina Shay MD Referring Provider:  Carl Syed DO   Patient Diagnoses    Reasons for Admission Coded Admission Diagnoses   CVA, Leukocytosis     *Hemiplegia of left nondominant side as late effect of cerebrovascular disease, unspecified cerebrovascular disease type, unspecified hemiplegia type [facility]) [I69.954]   Insurance Payors as of 2021    [facility] MEDIC[facility]    Plan: Ashlyn Bashir  Member: [id number] Effective from: 2021   Subscriber: Kervin Santizo ID: [id number] Guarantor: [id number]: Yohana Estes Member: [id number] Effective from: 2021   Subscriber: Abrahan Rhoades Subscriber ID: [id number] Guarantor: Abrahan Rhoades   Documents Filed to Patient    Power of  Living Will Clinical Unknown Study Attachment Consent Form ABN Waiver After Visit Summary Lab Result Scan Code Status MyChart Status Advance Care Planning   Not on File Not on File Not on File Not on File Not on File Not on File Not on File Not on File FULL [Updated on 21 1310] Inactive Jump to the Activity    Auth/Cert Information    Open Auth/Cert linked to Hospital Account [de-identified] Emergency Contact(s)    Name Relation Home Work Emily Mark Child   [phone]   Admission Information    Current Information    Attending Provider Admitting Provider Admission Type Admission Status   MD Rose Mary Pérez DO Elective Confirmed Admission          Admission Date/Time Discharge Date Hospital Service Auth/Cert Status   29/93/80  02:12 AM  Med/Surg [place] Unit Room/Bed    [address] [place] [facility] [address] [facility] Account    Name Acct ID Class Status Primary Coverage   Read Aliment [id number] Inpatient Open [address] [address]           Guarantor Account (for Hospital Account [de-identified])    Name Relation to Pt Service Area Active? Acct Type   Read Aliment Self [employer] [facility] Yes Personal/Family   Address Phone     [address] [facility] [facility], [facility] [phone])            Coverage Information (for Hospital Account [de-identified])    1. Jimbo Sheppard MEDICARE     F/O Payor/Plan Precert #   Jimbo Sheppard MEDICARE     Subscriber Subscriber #   Read Aliment [id number]   Address Phone   [address] [address] [facility], [address] [facility] [phone]   2.  [place]    F/O Payor/Plan Precert #   [place]    Subscriber Subscriber #   Read Aliment [id number]   Address Phone   [address]   [place], [address]           Last H&P Note    H&P by Gary Caceres MD at 5/29/2021  4:08 AM    Author: Gary Caceres MD Specialty: Hospital, Internal Medicine Author Type: Physician   Filed: 5/29/2021  8:36 PM Date of Service: 5/29/2021  4:08 AM Status: Addendum   : Gary Caceres MD (Physician)   Related Notes: Original Note by Gary Caceres MD (Physician) filed at 5/29/2021  4:41 AM      HISTORY AND PHYSICAL             Date: 5/29/2021        Patient Name: Virginia Koehler     YOB: 1955      Age:  63 y.o.     Chief Complaint   Patient transferred here from [facility]. She was seen there for slurred speech and dysarthria with left hemiplegia. This is a recurrent stroke sx for her, she was seen there three weeks ago, with right hemiplegia and expressive aphasia and right hand numbness.       Transferred here for higher level of care for cardiology and neurology evaluation and coordination of care. Please see records sent with her, they have concise data and information about what transpired and testing that was achieved and neuro evaluation by Dr. Анна Wheeler, neurology at [facility].     Chief complaint:  Acute onset slurred speech and left hemiplegia      History Obtained From   Patient and old chart sent with her.      History of Present Illness   This is a 63year old female with multiple chronic medical problems and has been debilitated for sometime now. She lives with son who helps with   Adl's and care.      She was admitted three weeks ago 4/28/2021  With expressive aphasia, dysarthria, facial droop and right hemiplegia as well. Abnormal mri finding of right  frontal lesion, and left parietal lesion   And appears that these were two separate events that occurred and probably consistent with ischemia and embolic events. RAMON and transthoracic echo obtained and she had a patent foramen ovale with atrial septal anuerysm noted and no thrombus. She would be considered a candidate for PFO closure and was set up to be seen outpatient. Unfortunately, she has sustained another event tonight with left sided hemiplegia, dysarthria, and facial droop. Dysarthria has resolved to some extent. Facial droop improved. She did not get TPA due having had a stroke recent three weeks ago.       She is sent here for cardiology and neurology evaluation.      She has slurred speech but understandable and coherent when she answers questions.   Slow speech.       No nausea, no vomiting , no diarrhea, no uti, no cough, no bronchitis and no other infectious sx.      For the last 10 years has had leukocytosis and peripheral smear was evaluated and she has findings consistent with myelodysplasia.            Past Medical History           Past Medical History:   Diagnosis Date    Cerebral artery occlusion with cerebral infarction [facility]      COPD (chronic obstructive pulmonary disease) (HCC)      Diabetes mellitus ([facility]      Hypertension      Neuromuscular disorder ([facility]      renal failure   ckd   Narcotic withdrawal   Heart disease   Cardiac stents in place and on asa and plavix  Gastric resection  Copd used to smoke quit several years ago. Splenectomy   MI        Past Surgical History            Past Surgical History:   Procedure Laterality Date    ABDOMEN SURGERY         gastric resection    APPENDECTOMY        CARDIAC SURGERY         SECTION         x2    CHOLECYSTECTOMY        CORONARY ANGIOPLASTY        SPLENECTOMY             Medications Prior to Admission              Prior to Admission medications    Medication Sig Start Date End Date Taking?  Authorizing Provider   glipiZIDE (GLUCOTROL XL) 5 MG extended release tablet Take 5 mg by mouth daily     Yes Historical Provider, MD   furosemide (LASIX) 20 MG tablet Take 20 mg by mouth daily     Yes Historical Provider, MD   losartan (COZAAR) 100 MG tablet Take 100 mg by mouth daily     Yes Historical Provider, MD   oxyCODONE-acetaminophen (PERCOCET)  MG per tablet Take 1 tablet by mouth every 6 hours as needed for Pain.     Yes Historical Provider, MD   potassium chloride (MICRO-K) 10 MEQ extended release capsule Take 10 mEq by mouth 2 times daily     Yes Historical Provider, MD   pregabalin (LYRICA) 150 MG capsule Take 150 mg by mouth 2 times daily.     Yes Historical Provider, MD         Allergies   Ibuprofen, Metformin, Hydrocodone, and Nsaids     Social History          Social History              Tobacco History              Smoking Status  Former Smoker Quit date  1/1/2019 Smoking Frequency  1 pack/day for 28 years (28 pk yrs) Smoking Tobacco Type  Cigarettes           Smokeless Tobacco Use  Never Used                  Alcohol History           Alcohol Use Status  Not Currently                  Drug Use           Drug Use Status  Never                  Sexual Activity           Sexually Active  Not Currently                     Family History   Non contributory      Review of Systems   Review of Systems   Constitutional: Positive for activity change. HENT: Negative. Eyes: Negative. Respiratory: Positive for cough and shortness of breath. Cardiovascular: Positive for chest pain, palpitations and leg swelling. Gastrointestinal: Negative. Endocrine: Negative. Genitourinary: Negative. Musculoskeletal: Positive for arthralgias, back pain, joint swelling and myalgias. Neurological: Positive for dizziness, facial asymmetry, speech difficulty and weakness. Left hemiplegia   Left arm and leg weakness as compared to the right   Both sides are weak though    Hematological: Negative. Psychiatric/Behavioral: The patient is nervous/anxious. All other systems reviewed and are negative.        Physical Exam   BP (!) 170/100   Pulse 72   Temp 97.7 °F (36.5 °C) (Temporal)   Resp 16   Ht 5' 5\" (1.651 m)   Wt 220 lb 3.2 oz (99.9 kg)   SpO2 94%   BMI 36.64 kg/m²      Physical Exam  Vitals and nursing note reviewed. Constitutional:       General: She is in acute distress. Appearance: She is obese. She is ill-appearing. HENT:      Head: Normocephalic and atraumatic. Nose: Nose normal.      Mouth/Throat:      Comments: Poor dentition  Edentulous   Eyes:      Extraocular Movements: Extraocular movements intact. Conjunctiva/sclera: Conjunctivae normal.   Cardiovascular:      Rate and Rhythm: Normal rate and regular rhythm. Pulses: Normal pulses. Heart sounds: Normal heart sounds.    Pulmonary:      Breath sounds: Rales present. Abdominal:      Comments: Obese   Non distended  Non tender  No rebound no guarding    Musculoskeletal:      Right lower leg: Edema present. Left lower leg: Edema present. Skin:     General: Skin is warm. Neurological:      Comments: Left sided hemiplegia   Dysarthria improved   She has slow deliberate speech   Crying when I saw her and she states she is in pain due back issues etc.              Labs    No results found for this or any previous visit (from the past 24 hour(s)).    Imaging/Diagnostics Last 24 Hours   No results found.     Assessment               Hospital Problems          Last Modified POA     * (Principal) Hemiplegia of left nondominant side as late effect of cerebrovascular disease ([address] 5/29/2021 Yes     Hypertension 5/29/2021 Yes     CAD (coronary artery disease) 5/29/2021 Yes     History of heart artery stent 5/29/2021 Yes     COPD (chronic obstructive pulmonary disease) ([address] 5/29/2021 Yes     Dyspnea 5/29/2021 Yes     Shortness of breath 5/29/2021 Yes     Falls frequently 5/29/2021 Yes     Gait instability 5/29/2021 Yes     Hyperlipidemia associated with type 2 diabetes mellitus ([address] 5/29/2021 Yes     Morbid obesity ([address] 5/29/2021 Yes     CKD (chronic kidney disease) stage 3, GFR 30-59 ml/min 5/29/2021 Yes     Narcotic habituation, continuous ([address] 5/29/2021 Yes     Edentulous 5/29/2021 Yes     CVA (cerebral vascular accident) ([address] 5/29/2021 Yes     Patent foramen ovale with atrial septal aneurysm 5/29/2021 Yes     Left hemiplegia ([address] 5/29/2021 Yes     Nephropathy 5/29/2021 Yes     Neuropathy 5/29/2021 Yes             Plan   1. Patient transferred here for recurrent and worsening neurological issues. Recurrent strokes. This time with left hemiplegia and dysarthria, but not aphasia  Mild facial droop resolved.      Previous strokes three weeks ago.    Thought to be ischemic and embolic  Noted on evaluation mri to represent multiple sites in the past. (right frontal and left posterior parietal ) with right hemiplegia, expressive aphasia and facial droop that resolved over the last three weeks and she then had recurrent sx today involving left side.      Cardiology work up and ray consistent with PFO with atrial septal anuerysm.      Sent to [facility] for higher level of care.      Will order mri of head and neck and start evaluation regarding current sx and consult neurology to help with management      Cardiology evaluation and opinion and regarding Patent foramen ovale with atrial septal anuerysm . And consideration for closure.      Neuro checks.      Continue plavix and asa      Control bp   And permissive hypertension to avoid wide fluctuations in bp      2. Hypertension chronic      3. Type ii dm with complications   accuchecks and insulin sliding scale and hold off on long acting oral hypoglycemic agents.     4.  ckd and nephropathy   Hydrate and check follow up labs.      5. Diffuse back pain and leg pain   And give pain meds to get her comfortable and minimize agitation     6.  dvt and gi prophylaxis.      7. Copd and mild chronic hypoxemia   And oxygen dependent.      8.  Leukocytosis   And chronic elevation for years per chart   And will hold off on abx as no specific source is noted and monitor.    Consider heme evaluation while here      Consultations Ordered:  IP CONSULT TO CARDIOLOGY  IP CONSULT TO NEUROLOGY     Electronically signed by Marcy Amaro MD on 5/29/21 at 4:08 AM CDT         Utilization Reviews       Stroke: Ischemic - Care Day 9 (6/6/2021) by Laureen Ervin RN       Review Entered Review Status   6/7/2021 15:45 Completed      Criteria Review      Care Day: 9 Care Date: 6/6/2021 Level of Care: ICU    Guideline Day 3    Level Of Care    (X) Stroke unit or floor to discharge    Clinical Status    (X) * Hemodynamic stability    6/7/2021 4:42 PM EDT by Romero Pritchett      129/82, 89    (X) * Dangerous arrhythmia absent 6/7/2021 4:42 PM EDT by Abdi Mcnamara      Cardiovascular:      Rate and Rhythm: Normal rate and regular rhythm.      Heart sounds: Normal heart sounds. ( ) * Mental status at baseline or stable and appropriate for next level of care    ( ) * Neurologic deficits absent or stable and appropriate for next level of care    ( ) * Discharge plans and education understood    Activity    ( ) * Ambulatory or acceptable for next level of care    Routes    ( ) * Oral hydration    ( ) * Oral medications or regimen acceptable for next level of care    ( ) * Oral diet or acceptable for next level of care    Interventions    ( ) * Supplemental oxygen absent or at baseline requirement    Medications    ( ) * Antithrombotic therapy appropriate for next level of care established    * Milestone   Additional Notes   6/6   IP ICU   Subjective:   Jina Asencio is a 63 y.o. female  whom we are following for mental status change, encephalopathy, new acute ischemic right hemispheric stroke. She is currently getting an EEG.  She is not as agitated as she was yesterday      Objective:   Blood pressure 134/81, pulse 70, temperature 98.7 °F (37.1 °C), temperature source Temporal, resp. rate 20, height 5' 5\" (1.651 m), weight 222 lb 4.8 oz (100.8 kg), SpO2 98 %. [facility]      Physical Exam   Vitals and nursing note reviewed. Constitutional:        Appearance: She is ill-appearing. HENT:       Head: Normocephalic and atraumatic.       Right Ear: External ear normal.       Left Ear: External ear normal.       Nose: Nose normal.       Mouth/Throat:       Mouth: Mucous membranes are moist.    Eyes:       Conjunctiva/sclera: Conjunctivae normal.       Pupils: Pupils are equal, round, and reactive to light. Cardiovascular:       Rate and Rhythm: Normal rate and regular rhythm.       Heart sounds: Normal heart sounds. Pulmonary:       Effort: Pulmonary effort is normal.       Breath sounds: Normal breath sounds.     Abdominal:       General: Abdomen is flat.       Palpations: Abdomen is soft. Musculoskeletal:          General: Normal range of motion.       Cervical back: Neck supple. No rigidity. No muscular tenderness. Skin:      General: Skin is warm and dry.          Assessment       Right hemisphere, cerebral infarction ischemic. Continue supportive care.       Status post PFO closure. Continue supportive care.       Persistent mental status change. Multifactorial.   Supportive care. Neurology following      NEUROLOGY NOTE:   Subjective:       Melissa Reinoso a 63y.o. year old woman with a history of hypertension, hyperlipidemia, diabetes, cardiovascular disease, and previous strokes who was transferred from [facility] 5/29 after having yet another stroke.         Interval History:    She had PFO closure 6/2.  She developed abdominal pain, urine retention, and right groin hematoma after but was able to move and talk. Alexis Quinn, she was noted to have diminished responsiveness but did have IV opioid.  She was transferred to the ICU.     Was transferred to the floor on 6/5.  Continues to moan most of the time.  Not following commands. Assessment:   1.         Altered mental status.  No evidence of recurrent stroke by MRI.  Symptoms appear to be more consistent with drug withdrawal in my opinion.  Review of her EEG overnight shows possible nonconvulsive seizures.  Seems to have improved some with Ativan.  Gave some at the bedside this morning without any change in clinical status.  Depakote level 66 today.    2.         Recent right hemisphere strokes with history of left and right hemisphere strokes convincing for cardioembolism.  The P2Y12 plt inh test indicates she is a Plavix nonresponder so after PFO closure, I'd recommend Brillinta.    2.         PFO with septal aneurysm   3.         Hypertension   4.         Diabetes       Plan:   1.         Bolus Depacon to get level closer to 100.  Total daily dose increased.  Vimpat added.  Continuous EEG for the next 24 hours.  Low dose fentanyl patch to cover from narcotic withdrawal.   2.         PT/OT/ST   3.         ASA/Brillinta/statin when able to take PO   n. Discharge planning: To be determined       LABS:   Sodium: 141   Potassium: 3.6   Chloride: 107   CO2: 26   BUN: 17   Creatinine: 0.9   Anion Gap: 8   GFR Non-: >60   GFR : >59   Glucose: 143 (H)   Calcium: 9.3   Valproic Acid Lvl: 66.6   WBC: 17.8 (H)   RBC: 3.58 (L)   Hemoglobin Quant: 11.1 (L)   Hematocrit: 34.3 (L)   MCV: 95.8   MCH: 31.0   MCHC: 32.4 (L)   MPV: 10.5   RDW: 13.9   Platelet Count: 744      MEDS:   fentaNYL (DURAGESIC) 25 MCG/HR 1 patch    Dose: 1 patch   Freq: EVERY 72 HOURS Route: TD      lacosamide (VIMPAT) 100 mg in dextrose 5 % 60 mL IVPB    Dose: 100 mg   Freq: 2 TIMES DAILY Route: IV      metoprolol (LOPRESSOR) injection 5 mg    Dose: 5 mg   Freq: EVERY 6 HOURS Route: IV      valproate (DEPACON) 1,000 mg in dextrose 5 % 100 mL IVPB    Dose: 1,000 mg   Freq: EVERY 8 HOURS Route: IV      valproate (DEPACON) 1,500 mg in sodium chloride 0.9 % 100 mL IVPB    Dose: 1,500 mg   Freq: ONCE Route: IV      valproate (DEPACON) 500 mg in dextrose 5 % 100 mL IVPB    Dose: 500 mg   Freq: EVERY 8 HOURS Route: IV      LORazepam (ATIVAN) injection 0.5 mg    Dose: 0.5 mg   Freq: EVERY 4 HOURS PRN Route: IV             Stroke: Ischemic - Care Day 8 (6/5/2021) by Cally Cr RN       Review Entered Review Status   6/7/2021 15:33 Completed      Criteria Review      Care Day: 8 Care Date: 6/5/2021 Level of Care: ICU    Guideline Day 3    Level Of Care    (X) Stroke unit or floor to discharge    Clinical Status    (X) * Hemodynamic stability    6/7/2021 4:33 PM EDT by Binu Ferguson      152/88, 90    (X) * Dangerous arrhythmia absent    6/7/2021 4:33 PM EDT by Binu Ferguson      Cardiovascular:      Rate and Rhythm: Normal rate and regular rhythm.      Heart sounds: Normal heart sounds. ( ) * Mental status at baseline or stable and appropriate for next level of care    ( ) * Neurologic deficits absent or stable and appropriate for next level of care    ( ) * Discharge plans and education understood    Activity    ( ) * Ambulatory or acceptable for next level of care    Routes    ( ) * Oral hydration    ( ) * Oral medications or regimen acceptable for next level of care    ( ) * Oral diet or acceptable for next level of care    Interventions    ( ) * Supplemental oxygen absent or at baseline requirement    6/7/2021 4:33 PM EDT by Monty Espinoza      2LNC    Medications    ( ) * Antithrombotic therapy appropriate for next level of care established    * Milestone   Additional Notes   6/5   IP ICU   Neela Bridges is a 63 y.o. female  whom we are following for mental status change, encephalopathy, new acute ischemic right hemispheric stroke. No significant clinical change in her neurologic status overnight.  She still will not engage in conversation.  She lies in bed and just moans.  Hemodynamics are otherwise stable.    Objective:   Blood pressure (!) 167/89, pulse 91, temperature 96.9 °F (36.1 °C), temperature source Temporal, resp. rate 10, height 5' 5\" (1.651 m), weight 222 lb 4.8 oz (100.8 kg), SpO2 99 % 2LNC      Physical Exam   Vitals and nursing note reviewed. Constitutional:        Appearance: She is ill-appearing. HENT:       Head: Normocephalic and atraumatic.       Right Ear: External ear normal.       Left Ear: External ear normal.       Nose: Nose normal.       Mouth/Throat:       Mouth: Mucous membranes are moist.    Eyes:       Conjunctiva/sclera: Conjunctivae normal.       Pupils: Pupils are equal, round, and reactive to light. Cardiovascular:       Rate and Rhythm: Normal rate and regular rhythm.       Heart sounds: Normal heart sounds. Pulmonary:       Effort: Pulmonary effort is normal.       Breath sounds: Normal breath sounds.     Abdominal:       General: Abdomen is CT imaging dated   6/3/2021   TECHNIQUE: Multiplanar MR imaging the brain was performed. FINDINGS:    There is image quality degradation with large amount patient motion   artifact. There is small amount of residual restricted diffusion signal   identified in the right periventricular white matter near the   body/atrium of the lateral ventricle and in the putamen, near the   external capsule is noted on the previous MR. There are no new or developing areas of restricted diffusion, acute   ischemic change/infarction. There is corresponding FLAIR signal hyperintensities there is a   previously described infarct ischemic changes. There is FLAIR signal hyperintensities in the periventricular   subcortical white matter. There is no convincing evidence of abnormal intra-axial extra-axial   blood products. .   There is no hydrocephalus. There is central and cortical atrophy.       Impression   1. Image quality degradation related to large amount of patient motion   artifact. 2. Mild residual restricted diffusion in areas of previously described   infarct in the right periventricular and basal ganglia/putamen. No   developing or acute areas of acute ischemic change/infarction. Signed by Dr Adarsh Griffin on 6/4/2021 3:02 PM           Assessment:   1.         Altered mental status.  No evidence of recurrent stroke by MRI.  Symptoms appear to be more consistent with drug withdrawal in my opinion. Noemy Arcadio will repeat EEG today.  Continue Depakote and add Geodon x1. .  Considering low-dose fentanyl patch. 2.         Recent right hemisphere strokes with history of left and right hemisphere strokes convincing for cardioembolism.  The P2Y12 plt inh test indicates she is a Plavix nonresponder so after PFO closure, I'd recommend Brillinta.    2.         PFO with septal aneurysm   3.         Hypertension   4.         Diabetes       Plan:   1.         Geodon today.  Repeat EEG discussed with nurse.  Continue Depakote   2.         PT/OT/ST   3.         ASA/Brillinta/statin when able to take PO   n. Discharge planning: To be determined      LABS:   Sodium: 141   Potassium: 3.9   Chloride: 106   CO2: 24   BUN: 21   Creatinine: 1.1 (H)   Anion Gap: 11   GFR Non-: 50 (A)   GFR : >59   Glucose: 179 (H)   Calcium: 9.2   WBC: 21.6 (H)   RBC: 3.58 (L)   Hemoglobin Quant: 11.3 (L)   Hematocrit: 34.5 (L)   MCV: 96.4   MCH: 31.6 (H)   MCHC: 32.8 (L)   MPV: 11.0   RDW: 14.0   Platelet Count: 337      MEDS:   LORazepam (ATIVAN) injection 2 mg    Dose: 2 mg   Freq: ONCE Route: IV X1      metoprolol (LOPRESSOR) injection 5 mg    Dose: 5 mg   Freq: EVERY 6 HOURS Route: IV      valproate (DEPACON) 1,500 mg in sodium chloride 0.9 % 100 mL IVPB    Dose: 1,500 mg   Freq: ONCE Route: IV      valproate (DEPACON) 500 mg in dextrose 5 % 100 mL IVPB    Dose: 500 mg   Freq: EVERY 8 HOURS Route: IV      ziprasidone (GEODON) injection 20 mg    Dose: 20 mg   Freq: ONCE Route: IM X1      0.9 % sodium chloride infusion    Rate: 100 mL/hr Freq: CONTINUOUS Route: IV      LORazepam (ATIVAN) injection 0.5 mg    Dose: 0.5 mg   Freq: EVERY 4 HOURS PRN Route: IVX1            Stroke: Ischemic - Care Day 7 (6/4/2021) by Josué Lane RN       Review Entered Review Status   6/7/2021 15:24 Completed      Criteria Review      Care Day: 7 Care Date: 6/4/2021 Level of Care: Intermediate Care    Guideline Day 3    Level Of Care    (X) Stroke unit or floor to discharge    Clinical Status    (X) * Hemodynamic stability    6/7/2021 4:24 PM EDT by Patsy Stroud 174/100, 85    (X) * Dangerous arrhythmia absent    6/7/2021 4:24 PM EDT by Beth Mosley      Cardiovascular:      Rate and Rhythm: Normal rate and regular rhythm.      Heart sounds: Normal heart sounds.     ( ) * Mental status at baseline or stable and appropriate for next level of care    ( ) * Neurologic deficits absent or stable and appropriate for next level of care    ( ) * Discharge plans and education understood    Activity    ( ) * Ambulatory or acceptable for next level of care    Routes    ( ) * Oral hydration    ( ) * Oral medications or regimen acceptable for next level of care    ( ) * Oral diet or acceptable for next level of care    Interventions    (X) * Supplemental oxygen absent or at baseline requirement    6/7/2021 4:24 PM EDT by Tevin Muller RA    (X) Neurologic checks    Medications    ( ) * Antithrombotic therapy appropriate for next level of care established    * Milestone   Additional Notes   6/4   IP ICU   Subjective:   Altagracia Farley is a 63 y.o. female  whom we are following for mental status change, encephalopathy, new acute ischemic right hemispheric stroke. She underwent a PFO closure on 6/2.  Post procedure she has had persistent mental status changes.  She was transferred to ICU for closer monitoring.  Neurology and cardiology are following. Deisi Frank will just lie and moan in bed.  She does not follow commands.  No sign of seizure activity on EEG.  She had a repeat MRI of the brain that did not show any new infarct however there was quite a bit of motion artifact.       Objective:   Blood pressure (!) 174/100, pulse 128, temperature 97.2 °F (36.2 °C), temperature source Temporal, resp. rate 13, height 5' 5\" (1.651 m), weight 223 lb 14.4 oz (101.6 kg), SpO2 93 %. RA      Physical Exam   Vitals and nursing note reviewed. Constitutional:        Appearance: She is ill-appearing. HENT:       Head: Normocephalic and atraumatic.       Right Ear: External ear normal.       Left Ear: External ear normal.       Nose: Nose normal.       Mouth/Throat:       Mouth: Mucous membranes are moist.    Eyes:       Conjunctiva/sclera: Conjunctivae normal.       Pupils: Pupils are equal, round, and reactive to light. Cardiovascular:       Rate and Rhythm: Normal rate and regular rhythm.       Heart sounds: Normal heart sounds.     Pulmonary:       Effort: Pulmonary effort is normal.       Breath sounds: Normal breath sounds. Abdominal:       General: Abdomen is flat.       Palpations: Abdomen is soft. Musculoskeletal:          General: Normal range of motion.       Cervical back: Neck supple. No rigidity. No muscular tenderness. Skin:      General: Skin is warm and dry.          Assessment       Right hemisphere, cerebral infarction ischemic. Continue supportive care.       Status post PFO closure. Continue supportive care.       Persistent mental status change.    Multifactorial.   Supportive care.       LABS:   Sodium: 138   Potassium: 3.9   Chloride: 105   CO2: 23   BUN: 20   Creatinine: 1.1 (H)   Anion Gap: 10   GFR Non-: 50 (A)   GFR : >59   Glucose: 151 (H)   Calcium: 9.1   WBC: 21.5 (H)   RBC: 3.82 (L)   Hemoglobin Quant: 12.0   Hematocrit: 36.0 (L)   MCV: 94.2   MCH: 31.4 (H)   MCHC: 33.3   MPV: 11.0   RDW: 13.9   Platelet Count: 001      MEDS:   aspirin chewable tablet 81 mg    Dose: 81 mg   Freq: DAILY Route: PO      LORazepam (ATIVAN) injection 2 mg    Dose: 2 mg   Freq: ONCE Route: IV  X1      metoprolol (LOPRESSOR) injection 5 mg    Dose: 5 mg   Freq: EVERY 6 HOURS Route: IV      ticagrelor (BRILINTA) tablet 90 mg    Dose: 90 mg   Freq: 2 TIMES DAILY Route: PO      valproate (DEPACON) 1,500 mg in sodium chloride 0.9 % 100 mL IVPB    Dose: 1,500 mg   Freq: ONCE Route: IV      valproate (DEPACON) 500 mg in dextrose 5 % 100 mL IVPB    Dose: 500 mg   Freq: EVERY 8 HOURS Route: IV      0.9 % sodium chloride infusion    Rate: 100 mL/hr Freq: CONTINUOUS Route: IV      hydrALAZINE (APRESOLINE) injection 5 mg    Dose: 5 mg   Freq: EVERY 4 HOURS PRN Route: IV X1      LORazepam (ATIVAN) injection 0.5 mg    Dose: 0.5 mg   Freq: EVERY 4 HOURS PRN Route: IV X3            Stroke: Ischemic - Care Day 6 (6/3/2021) by Jimenez Baltazar RN       Review Entered Review Status   6/7/2021 15:03 Completed      Criteria Review      Care Day: 6 Care Date: 6/3/2021 Level of Care: Intermediate Care    Guideline Day 3    Level Of Care    (X) Stroke unit or floor to discharge    Clinical Status    ( ) * Hemodynamic stability    6/7/2021 4:03 PM EDT by Monty Espinoza      hr: 107    (X) * Dangerous arrhythmia absent    6/7/2021 4:03 PM EDT by Monty Espinoza      Cardiovascular:  RRR E3B4 no murmurs, clicks, gallups, or rubs    ( ) * Mental status at baseline or stable and appropriate for next level of care    ( ) * Neurologic deficits absent or stable and appropriate for next level of care    ( ) * Discharge plans and education understood    Activity    ( ) * Ambulatory or acceptable for next level of care    Routes    ( ) * Oral hydration    ( ) * Oral medications or regimen acceptable for next level of care    ( ) * Oral diet or acceptable for next level of care    Interventions    ( ) * Supplemental oxygen absent or at baseline requirement    (X) Neurologic checks    Medications    ( ) * Antithrombotic therapy appropriate for next level of care established    * Milestone   Additional Notes   6/3   IP  ICU   Events of Last 24 hours: pt became more lethargic, had repeat CT head done that did not show new stroke but neurologist thinks she could have another one. MRI not able to be done due to recent hardware put in to fix PFO. Pt at this point is lethargic and answers very basic questions.         CC: slurred speech, left weakness   S: in pain    O: moderate distress, currently receiving enema, still lethargic        Vitals:   BP (!) 125/57   Pulse 86   Temp 96.3 °F (35.7 °C) (Temporal)   Resp 9   Ht 5' 5\" (1.651 m)   Wt 221 lb 3.2 oz (100.3 kg)   SpO2 91% 2LNC           Intake/Output Summary (Last 24 hours) at 6/3/2021 1721   Last data filed at 6/3/2021 1400   Gross per 24 hour   Intake 1177.92 ml   Output 1800 ml   Net -622.08 ml           EXAM:   General: moderate distress. Lethargic    Eyes: No sclera icterus. No conjunctival injection.    ENT: No discharge. Pharynx clear. Neck: Trachea midline. Normal thyroid. Resp: No accessory muscle use. No crackles. No wheezing. No rhonchi. No dullness on percussion. CV: Regular rate. Regular rhythm. No mumur or rub. No edema. GI: Non-tender. Non-distended. No masses. No organmegaly. Normal bowel sounds. No hernia. Skin: Warm and dry. No nodule on exposed extremities. No rash on exposed extremities. Lymph: No cervical LAD. No supraclavicular LAD. M/S: No cyanosis. No joint deformity. No clubbing. Neuro: lethargic, does not follow commends   Psych: lethargic, not oriented        MEDS:   0.9 % sodium chloride infusion    Rate: 100 mL/hr Freq: CONTINUOUS Route: IV      hydrALAZINE (APRESOLINE) injection 5 mg    Dose: 5 mg   Freq: EVERY 4 HOURS PRN Route: IVX1      aspirin chewable tablet 81 mg    Dose: 81 mg   Freq: DAILY Route: PO      clopidogrel (PLAVIX) tablet 75 mg    Dose: 75 mg   Freq: DAILY Route: PO      Films:   CT head today \"No new acute intracranial abnormality. No change since    previous MRI dated 5/29/2021. The foci of decreased attenuation    representing acute/subacute infarction in the right periventricular    white matter and the right basal ganglia are identified. A focus of    encephalomalacia/chronic infarction in the left posterior frontal    subcortical white matter was also noted in the previous MR imaging of    the brain.  No change\"           Assessment/plan    · New right hemisphere stroke- tx per neurology    · PFO- sp repair 6/2  per cardiology   · HTN- hydralazine prn   · DM2- ISS   · Constipation - treat with enema   · Urinary retention- emanuel in place   · Hematoma at the incision site- resolving   · ROB - resolving with IVF and emanuel catheter    · Leukocytosis- monitor- UA neg, CXR neg, no fever       Patient Active Problem List:      CAD (coronary artery disease)      History of heart artery stent      COPD (chronic obstructive pulmonary disease) ([facility] [address]      Falls frequently      Gait instability      Hyperlipidemia       Narcotic habituation, continuous ([facility] [address]      Neuropathy      History of multiple strokes       Pt will need SNF, PT/OT ordered      LABS:   Sodium: 140   Potassium: 4.4   Chloride: 105   CO2: 24   BUN: 20   Creatinine: 1.3 (H)   Anion Gap: 11   GFR Non-: 41 (A)   GFR : 50 (L)   Glucose: 182 (H)   Calcium: 9.2   Pro-BNP: 659   Ammonia: 33   TSH: 0.732   WBC: 24.5 (H)   RBC: 4.01 (L)   Hemoglobin Quant: 12.8   Hematocrit: 38.0   MCV: 94.8   MCH: 31.9 (H)   MCHC: 33.7   MPV: 10.6   RDW: 13.3   Platelet Count: 735      6/3/2021 12:13   Color, UA: YELLOW   Clarity, UA: Clear   Glucose, UA: 250 (A)   Bilirubin, Urine: Negative   Ketones, Urine: TRACE (A)   Specific Gravity, UA: 1.019   Blood, Urine: SMALL (A)   pH, UA: 5.5   Protein, UA: TRACE (A)   Urobilinogen, Urine: 0.2   Nitrite, Urine: Negative   Leukocyte Esterase, Urine: Negative   Hyaline Casts, UA: 3   WBC, UA: 1   RBC, UA: 12 (H)   Epithelial Cells, UA: 1   Bacteria, UA: NEGATIVE (A)   Crystals, UA: NEG (A)   URINE RT REFLEX TO CULTURE: Rpt (A)      6/3/2021 12:47   POC Glucose: 145 (H)      6/3/2021 15:07   Potassium, Whole Blood: 4.1   O2 Therapy: Unknown   Hemoglobin, Art, Extended: 13.3   pH, Arterial: 7.360   pCO2, Arterial: 41.0   pO2, Arterial: 141.0 (H)   HCO3, Arterial: 23.2   Base Excess, Arterial: -2.2 (L)   O2 Sat, Arterial: 96.4   O2 Content, Arterial: 18.3   Methemoglobin, Arterial: 1.7   Carboxyhgb, Arterial: 1.8         Stroke: Ischemic - Care Day 5 (6/2/2021) by Ladonna Murdock RN       Review Entered Review Status   6/4/2021 15:55 Completed      Criteria Review      Care Day: 5 Care Date: 6/2/2021 Level of Care: Intermediate Care    Guideline Day 3    Level Of Care    (X) Stroke unit or floor to discharge    6/4/2021 4:55 PM EDT by Khalif Olmos TO ICU    Clinical Status    (X) * Hemodynamic stability    6/4/2021 4:55 PM EDT by Usha Paul, Constance      BP: 148/88, PULSE 99    (X) * Dangerous arrhythmia absent    2021 4:55 PM EDT by Navid Mcdonough      NO DANGEROUS ARRHYTHMIAS NOTED    ( ) * Mental status at baseline or stable and appropriate for next level of care    ( ) * Neurologic deficits absent or stable and appropriate for next level of care    ( ) * Discharge plans and education understood    Activity    ( ) * Ambulatory or acceptable for next level of care    Routes    ( ) * Oral hydration    ( ) * Oral medications or regimen acceptable for next level of care    ( ) * Oral diet or acceptable for next level of care    Interventions    ( ) * Supplemental oxygen absent or at baseline requirement    (X) Neurologic checks    2021 4:55 PM EDT by Navid Mcdonough      NEURO CHECKS Q4HRS    Medications    ( ) * Antithrombotic therapy appropriate for next level of care established    * Milestone   Additional Notes   Lei Orozco RN Registered Nurse Progress Notes New Nursing [id number]    Note Text                Show:Clear all   ManualTemplateCopied      Added by:   Lei Orozco RN      [facility] for details   Hematoma noted at R groin site, manual pressure hold for 20 min, hematoma reoccurred later. R pedal, tibial pulse 2+ with Doppler. Notified Dr. Jocelin Anna. Received order for stat H&H.       Bladder scan shows 900 ml retention. Pt c/o severe pain to her abd. Spoke to Dr. Sanju Prakash on the phone at [phone] regarding pt's status. KUB result reviewed per Dr. Sanju Prakash, pt had bowl obstruction. Received order for General Surgery consul, insert emanuel catheter, keep pt NPO. Will monitor for another hour, pending H&H, possible transfer to ICU per Dr. Sanju Prakash.       Manual pressure hold for another 15 min, hematoma resolved.                          Neurology Progress Note   2021 4:57 PM   Information:   Patient Name:           Christina Cartagena   :                           1955   Age:                            60 y.o.    MRN:                           [id number]   Account #:                 [id number]   Admit Date:                5/29/2021   Today:                        6/1/21                  ADMIT DX:    Right hemisphere, cerebral infarction (HCC)       Subjective:       Joao Murphy a 63y.o. year old woman with a history of hypertension, hyperlipidemia, diabetes, cardiovascular disease, and previous strokes who was transferred from [facility] 5/29 after having yet another stroke.        Interval History:    No new problems. Katia De La Torre left sided weakness has improved.  She is doing well with PT and OT.        Assessment:   1.         Right hemisphere strokes with history of left and right hemisphere strokes convincing for cardioembolism.  The P2Y12 plt inh test indicates she is a Plavix nonresponder so after PFO closure, I'd recommend Brillinta. 2.         PFO with septal aneurysm   3.         Hypertension   4.         Diabetes          Plan:   1.         Dr. Akash Perrin input appreciated. Candido Camp is to have PFO closure tomorrow.     2.         PT/OT/ST       Discharge planning:    Home       Reviewed treatment plans with the patient and/or family.     25 minutes spent in face to face interaction and coordination of care.        Electronically signed by Jared Bumpers, MD on 6/1/2021 at 4:57 PM                     Cardiology Daily Note   Jenni Villalobos MD         Subjective:   Ms. Kami Blake feels better, she denies any active chest pain or shortness of breath   She tells me this is her fourth stroke and she would like to avoid future episodes if possible      Recommendations:       Patient has been at this time from outside hospital with recurrent stroke, this is her fourth episode, 2D echo showed evidence of patent foramen ovale with atrial septal aneurysm   I was consulted for consideration of PFO closure, I believe that PFO closure will provide her with superior protection compared to oral anticoagulation without risk of bleeding in the future, patient agreed to proceed with PFO closure, will plan for procedure tomorrow   No evidence of A. fib during this admission or during prior admissions   We will hold Eliquis       -----------------------       Addendum:   ----------------       Risks, benefits, alternatives of transcatheter PFO closure discussed with the patient    I explained the procedure to the patient in detail and fully informed consent obtained.    Acceptable Mallampati score   Consent for general anesthesia will be obtained by anesthesia team   ASA 3       Enmanuel Lindquist MD, MD 6/2/2021 10:43 AM            Enmanuel Lindquist MD, [facility]   Interventional Cardiologist, Endovascular Specialist    Medical Director, Structural Heart Program    [address] [place] [address]  - PROGRESS NOTE       Admit Date: 5/29/2021                             CC: slurred speech, left hemiplegia       Subjective: obtunded        Objective: obtunded, not responding, still under anaesthesia effect       Diet: Diet NPO Exceptions are: Sips with Meds   Pain is:None   Nausea:None   Bowel Movement/Flatus no       Data:   Scheduled Meds: Reviewed   Current Facility-Administered Medications   Current Facility-Administered Medications   Medication Dose Route Frequency Provider Last Rate Last Admin   · 0.9 % sodium chloride infusion Intravenous Continuous Caitlin Scales  mL/hr at 06/02/21 1205 Rate Change at 06/02/21 1205   · [START ON 6/3/2021] perflutren lipid microspheres (DEFINITY) injection 1.65 mg 1.5 mL Intravenous ONCE PRN ALEXYS Gutierrez CNP    · sodium chloride flush 0.9 % injection 5-40 mL 5-40 mL Intravenous 2 times per day ALEXYS Yousif CNP    · sodium chloride flush 0.9 % injection 5-40 mL 5-40 mL Intravenous PRN ALEXYS Gutierrez CNP    · 0.9 % sodium chloride infusion 25 mL Intravenous PRN ALEXYS Gutierrez CNP    · acetaminophen (TYLENOL) tablet 650 mg 650 mg 0-6 Units Subcutaneous TID WC Selene Bryant MD 1 Units at 06/01/21 1709   · insulin lispro (HUMALOG) injection vial 0-3 Units 0-3 Units Subcutaneous Nightly Selene Bryant MD 1 Units at 06/01/21 2139   · glucose (GLUTOSE) 40 % oral gel 15 g 15 g Oral PRN Selene Bryant MD    · dextrose 50 % IV solution 12.5 g Intravenous PRN Selene Bryant MD    · glucagon (rDNA) injection 1 mg 1 mg Intramuscular PRN Selene Bryant MD    · dextrose 5 % solution 100 mL/hr Intravenous PRN Selene Bryant MD           DVT Prophylaxis: none, pt has hematoma       Continuous Infusions:   Infusions Meds   · sodium chloride 125 mL/hr at 06/02/21 1205   · sodium chloride    · sodium chloride    · dextrose               Intake/Output Summary (Last 24 hours) at 6/2/2021 2008   Last data filed at 6/2/2021 1819   Gross per 24 hour   Intake 600 ml   Output 603 ml   Net -3 ml      General appearance: obtunded   Skin: Skin color, texture, turgor normal.    HEENT: Head: Normocephalic, no lesions, without obvious abnormality.    Neck: no adenopathy, no carotid bruit, no JVD and supple, symmetrical, trachea midline   Lungs: clear to auscultation bilaterally   Heart: regular rate and rhythm, S1, S2 normal, no murmur, click, rub or gallop   Abdomen: soft, non-tender; bowel sounds normal; no masses,  no organomegaly   Extremities: extremities normal, atraumatic, no cyanosis or edema   Lymphatic: No significant lymph node enlargement papable   Neurologic: Mental status: obtunded            Assessment & Plan:    · New right hemisphere stroke- per neurology    · PFO- sp repair today per cardiology   · HTN   · DM2   · Constipation without obstruction per gen surgery- treat    · Urinary retention- emanuel for now       Patient Active Problem List:      CAD (coronary artery disease)      History of heart artery stent      COPD (chronic obstructive pulmonary disease) ([facility] [address]      Falls frequently      Gait instability      Hyperlipidemia associated with type 2 diabetes mellitus (HCC)      CKD (chronic kidney disease) stage 3, GFR 30-59 ml/min      Narcotic habituation, continuous (HCC)      Neuropathy      History of multiple strokes        See orders    Disposition: TBD home vs rehab       Himanshu Curry MD                     Operative Note           Patient: Sarika Ramirez   YOB: 1955   MRN: [id number]       Date of Procedure:  6/2/2021       PFO Percutaneous Closure Procedure Note        Pre-Procedure Diagnosis: H/o recurrent CVA, PFO        Post-Procedure Diagnosis: Same        Shana Ortiz MD, [facility]       PROCEDURES PERFORMED:    RAOMN    Implant of an Amplatzer PFO Closure Device        Anesthesia Used: local 2% lidocaine, General anesthesia    Estimated Blood Loss: <30 mL    Condition: stable    IV Contrast Used: None    Anticoagulation: Heparin 89942 units IV    Complications: none    Specimen Removed: None        Implant: 35 mm Amplatzer PFO Septal Occluder       DESCRIPTION OF THE PROCEDURE:    The procedure was described to the patient including benefits, risks, and alternatives to the procedure. The patient confirmed understanding. The patient signed the informed consent. The patient was brought into the operating room. Bilateral groins were prepped in a sterile fashion, and a sterile drape was placed over the patient.        Using the modified Seldinger technique under US guidance, and a micro-puncture needle, vascular access was obtained to the right femoral vein and an  [place] 11 cm sheath was introduced in the right femoral vein.       Anticoagulation with Heparin was initiated. ACTs were performed and adjustments in heparin anticoagulation were made as necessary.       A 6F MP A1 catheter was used to guide a 0.35'' Glide wire into the left atrium and positioned inside the left upper pulmonary vein.  Then the 0.35'' wire was removed and exchanged for a Amplatzer super stiff 0.35'' wire which was positioned in the left upper pulmonary vein.        A 35 mm Amplatzer PFO Septal Occluder was prepared under saline solution in a water sealed fashion and continuous flush was utilized to remove any residual bubble. The device was then loaded into the transseptal sheath and advanced into the left atrium over the Amplatzer super stiff 0.35'' wire. Under RAMON guidance, the left atrial disk was deployed. The device was then pulled against the interatrial septum to confirm adequate position. Next, the right atrial disk was deployed.        Confirmation of cessation of flow was performed by RAMON. Adequate positioning of the device was confirmed by pulling and pushing forward against the septum. The device was then released by unscrewing it with counterclockwise rotation. The device delivery system was removed and 8 figure suture applied to the skin with manual compression.       The patient was in hemodynamically stable condition throughout the procedure.        Conclusion and recommendations    ------------------------------       Patient will be given Plavix 300 mg for loading dose. To be continued on a daily dose of Plavix 75 mg and ASA 81 mg for total of 6 months than Aspirin alone indefinitely. Infectious Endocarditis prophylaxis with antibiotics for 6 months    Transfer to telemetry floor for routine post-op. Care    possible discharge in am if stable       Monitor the patient overnight in Telemetry Unit for hemodynamic status and groin complications.     2 D echo will be obtained in am.            Janalyn Meckel, MD, [facility]   Interventional Cardiologist, Endovascular Specialist    Medical Director, Structural Heart Program    [facility]           Electronically signed by Janalyn Meckel, MD on 6/2/2021 at 4:29 PM                     [facility]   Notification of Admission Decision           ? Patient has been accepted for admit to [facility] on :    [facility] write discharge orders and summary prior to discharge.       ? Patient acceptance to Rehab pending the following : insurance approval and medical stability. ? Eval in progress            ? Patient determined to be ineligible for services at [facility] because :        We recommend you consider                    Thank you for your referral, we appreciate you.  If you have any questions, please feel    free to contact me at [phone].       Electronically Signed by Jayden Ruano, Admissions Coordinator 6/2/2021 10:38 AM            Stroke: Ischemic - Care Day 4 (6/1/2021) by Rory Beltre RN       Review Entered Review Status   6/4/2021 15:19 Completed      Criteria Review      Care Day: 4 Care Date: 6/1/2021 Level of Care: Intermediate Care    Guideline Day 3    Level Of Care    (X) Stroke unit or floor to discharge    6/4/2021 4:19 PM EDT by Ana Maria Banegas      PCU    Clinical Status    (X) * Hemodynamic stability    6/4/2021 4:19 PM EDT by Ana Maria Banegas      BP: 129/89, PULSE 81    (X) * Dangerous arrhythmia absent    6/4/2021 4:19 PM EDT by Ana Maria Banegas      NO DANGEROUS ARRHYTHMIAS NOTED    (X) * Mental status at baseline or stable and appropriate for next level of care    6/4/2021 4:19 PM EDT by Sofia Maradiaga    (X) * Neurologic deficits absent or stable and appropriate for next level of care    6/4/2021 4:19 PM EDT by Douglas Santiago    ( ) * Discharge plans and education understood    Activity    (X) * Ambulatory or acceptable for next level of care    6/4/2021 4:19 PM EDT by Ana Maria Banegas      UP WITH ASSISTANCE    Routes    ( ) * Oral hydration    ( ) * Oral medications or regimen acceptable for next level of care    (X) * Oral diet or acceptable for next level of care    Interventions    (X) * Supplemental oxygen absent or at baseline requirement    6/4/2021 4:19 PM EDT by Ping Sesay Constance      NO OXYGEN    (X) Neurologic checks    2021 4:19 PM EDT by Anna Knox CHECKS Q4HRS    (X) Possible physical therapy    2021 4:19 PM EDT by Marcella Soto      PT EVAL AND TREAT    (X) Possible occupational and speech therapy    2021 4:19 PM EDT by Marcella Soto      OT/SPEECH EVAL AND TREAT    Medications    ( ) * Antithrombotic therapy appropriate for next level of care established    * Milestone   Additional Notes      Show:Clear all   ManualTemplateCopied      Added by:   MD Neli Watts for details   [facility] Progress Note       Date:2021   Patient: Miquel Haq  : 1955   VIKTORIA:[id number]   CODE:Full Code No additional code details   PCP:No primary care provider on file.       Admit Date: 2021  2:12 AM    LOS: 3 days            Subjective:    No acute events overnight.  Improvement in left-sided weakness noted.  No new focal weakness.  Speech fluent.               Hospital course: 62-year-old female with history of multiple bilateral strokes, hypertension, hyperlipidemia, diabetes, cardiovascular disease, presented as transfer from outside facility due to concern for another stroke with acute severe left-sided weakness and dysarthria.  Echo showed positive bubble study with PFO and septal aneurysm.  She previously had right-sided weakness prior to presentation, which has improved.  MRI brain here shows acute infarcts in right periventricular white matter and right basal ganglia as well as old bilateral remote infarcts.  Given recurrent embolic strokes with PFO started on therapeutic anticoagulation with Lovenox.  Cardiology on board with consideration for PFO closure.  Held Plavix to allow washout for PFO closure procedure.  Cardiology with plan for PFO closure on .       Assessment/plan   Principal Problem:     Right hemisphere, cerebral infarction [facility])   Active Problems:     Acute left-sided weakness     Hypertension     CAD (coronary artery disease)     History of heart artery stent     COPD (chronic obstructive pulmonary disease) ([address]     Dyspnea     Falls frequently     Gait instability     Hyperlipidemia associated with type 2 diabetes mellitus (HCC)     Morbid obesity (HCC)     CKD (chronic kidney disease) stage 3, GFR 30-59 ml/min     Narcotic habituation, continuous ([address]     Edentulous     Patent foramen ovale with atrial septal aneurysm     Nephropathy     Neuropathy     History of multiple strokes   Resolved Problems:     * No resolved hospital problems. *           Acute right hemisphere strokes, small acute infarcts in right periventricular white matter and right basal ganglia   History of multiple bilateral strokes   -Continue neurochecks   -Allowed permissive hypertension   -PFO with septal aneurysm confirmed on echo with positive bubble study    -Carotid Dopplers without significant stenosis   -Continue aspirin   -Anticoagulated on Lovenox, hold dose prior to procedure   -held Plavix to allow washout prior to potential PFO closure   --- Tentative plan for PFO closure per cardiology 6/2        PT/OT/speech       Diabetes mellitus   Glucose monitoring with sliding scale insulin correction if needed       Hypertension   Held losartan, allowing permissive hypertension        Leukocytosis, ?  Reactive, low suspicion for infection   --Downtrended   No evidence of infection on UA, No acute findings on chest x-ray   Procalcitonin negative       Disposition: Pending rehab assessment following PFO closure procedure.  Suspect may need SNF.  Plan to eventually be discharged on Zio patch for outpatient cardiac monitoring to rule out A. fib           Sandee Amanda MD 6/1/2021 9:49 AM                            Cardiology Daily Note   Bao Cadena MD         Subjective:   Ms. Erlinda Deluca feels better, she denies any active chest pain or shortness of breath   She tells me this is her fourth stroke and she would like to avoid future episodes if possible      Assessment:   1. Recurrent stroke .  Patient mentioned that this is her fourth stroke   2. DM   3. HTN   4. COPD           Recommendations:       Patient has been at this time from outside hospital with recurrent stroke, this is her fourth episode, 2D echo showed evidence of patent foramen ovale with atrial septal aneurysm   I was consulted for consideration of PFO closure, I believe that PFO closure will provide her with superior protection compared to oral anticoagulation without risk of bleeding in the future, patient agreed to proceed with PFO closure, will plan for procedure tomorrow   No evidence of A. fib during this admission or during prior admissions   We will hold Yesi Nj MD, MD 6/1/2021 3:31 PM            Kervin Nj MD, [facility], [facility]   Interventional Cardiologist, Endovascular Specialist    Medical Director, Structural Heart Program    [address] 06/01/21 1430   Restrictions/Precautions   Restrictions/Precautions Fall Risk   Required Braces or Orthoses? No   General   Chart Reviewed Yes   Additional Pertinent Hx recurrent stroke sx, she was seen three weeks ago, with right hemiplegia and expressive aphasia and right hand numbness. New finding: patent foramen ovale with atrial septal anuerysm noted and no thrombus   Subjective   Subjective Patient in bed agrees to therapy.   Says she has procedure scheduled for this PM   Pain Screening   Patient Currently in Pain Yes   Intervention List Patient able to continue with treatment;Nurse called to administer meds   Pain Assessment   Pain Assessment 0-10   Pain Level 8   Patient's Stated Pain Goal No pain   Pain Type Chronic pain   Pain Location Hip   Pain Orientation Right   Pain Descriptors Dull; Sharp   Pain Frequency Intermittent   Pain Onset On-going   Clinical Progression Not changed   Functional Pain Assessment Prevents or interferes some active activities and ADLs   Non-Pharmaceutical Pain Intervention(s) Rest   Response to Pain Intervention Patient Satisfied   Vital Signs   Level of Consciousness Alert (0)   Oxygen Therapy   O2 Device None (Room air)   Bed Mobility   Supine to Sit Independent   Sit to Supine Independent   Transfers   Sit to Stand Contact guard assistance   Stand to sit Contact guard assistance   Bed to Chair Contact guard assistance   Ambulation   Ambulation? Yes   Ambulation 1   Surface level tile   Device Rolling Walker   Assistance Contact guard assistance   Quality of Gait Steady no LOB   Gait Deviations Slow Leanna;Decreased step length   Distance 12'   Comments Gait limited by R hip pain   Exercises   Heelslides 20   Hip Flexion 20   Hip Abduction 20   Knee Long Arc Quad 20   Knee Active Range of Motion yes   Ankle Pumps 20   Comments BL LE's EX in sitting AROM.  R side weaker than left   Activity Tolerance   Activity Tolerance Patient Tolerated treatment well   Safety Devices   Type of devices Left in bed;Call light within reach; Bed alarm in place   Physical Therapy       Electronically signed by Becca Sebastian PTA on 6/1/2021 at 2:40 PM                        Date / Time of Evaluation: 6/1/2021 2:02 PM   Assessment Completed by: Iris Kern       Patient Admission Status: Inpatient [101]       [address]       [phone] (home)    Telephone Information:   Mobile [phone]           (Best Practice: [facility] [place] patient / caregiver verify above address and phone number by stating out loud their current address and reachable phone number.)   Is above information correct?  yes           Current PCP: Travis Dunlap MD   PCP verified?  No PCP listed but pt reports established at Dr Aliza Kern office in [place].  SW added to the pt's chart.       Initial Assessment Completed at bedside with:  Pt resting in bed       Emergency Contacts:   Extended Emergency Contact Information   Primary Emergency Contact: Mckenna Cooper [employer] Phone: [phone]   Relation: Child       Advance Directives: Code Status:  Full Code       Financial:  Payor: Cornelius Friedman / Plan: Cornelius Friedman  / Product Type: *No Product type* /        Pre-Cert required for SNF:  yes       Have Long Term Care Insurance: Billie Rodríguez at this time       Pharmacy:         [facility], [address]  [phone] - F [phone]   [address] [place] [address],[address]   Phone: [phone] Fax: [phone]           Potential assistance purchasing medications?  No pt reports affordable copays for meds/services through her insurance       ADLS:  Support System:  Son       Current Home Environment:  Resides at home and her son resides in the home with her as CG   Steps:  N/a       Plans to RETURN to current housing: yes   Barriers to RETURNING to current housing:  Pt does not think SNF therapy is necessary and refuses choice list for services. Pt is agreeable to [place] services following her cardiac procedure and upon dc.        Currently ACTIVE with Home Health CARE:  None pta but agreeable to services upon dc.    Home Health Care Agency:  n/a       DME Provider: Ary Terese       Has a pulse oximetry unit at home: no       Had [address] prior to admission:  None pta and not requiring O2 this admission   [address]:  n/a   Informed of need to bring portable home O2 tank to hospital on day of DISCHARGE:  n/a   Name of person committed to bringing portable tank at discharge:  n/a       Active with HD/PD prior to admission: monica elias    Nephrologist:  n/a   HD Center:  n/a       Transition Plan: [facility] home with her son as CG and agreeable to [place] referral.       Transportation PLAN for Discharge:  Son to assist at [facility]       Factors facilitating achievement of predicted outcomes: [place] referral and continue to follow for further potential dc assistance prior to dc.       Barriers to discharge:  Refuses SNF consideration for therapy at this time.         Additional CM/SW Notes: Pt reports her son works from home and available as her CG 24/7. Pt states her son works for the [employer] and has all the resources necessary to manage her care needs. Pt has cardiac procedure scheduled and currently refuses consideration for SNF and choice letter. Pt is agreeable to [facility] services upon dc for SN and PT/OT services.  SW will request referral and assist with other dc needs as identified.                            [facility] Department   Ph:  [phone]                              Fax: [phone]         Stroke: Ischemic - Care Day 3 (5/31/2021) by Rory Beltre RN       Review Entered Review Status   6/1/2021 14:35 Completed      Criteria Review      Care Day: 3 Care Date: 5/31/2021 Level of Care: Intermediate Care    Guideline Day 3    Level Of Care    (X) Stroke unit or floor to discharge    6/1/2021 3:35 PM EDT by Ana Maria Banegas      MED/SURG    Clinical Status    (X) * Hemodynamic stability    6/1/2021 3:35 PM EDT by Ana Maria Banegas      BP: 157/88, PULSE 78    (X) * Dangerous arrhythmia absent    6/1/2021 3:35 PM EDT by Ana Maria Banegas      NO DANGEROUS ARRHYTHMIAS NOTED    (X) * Mental status at baseline or stable and appropriate for next level of care    6/1/2021 3:35 PM EDT by [facility]    (X) * Neurologic deficits absent or stable and appropriate for next level of care    6/1/2021 3:35 PM EDT by Radha PAULINO    ( ) * Discharge plans and education understood    Activity    (X) * Ambulatory or acceptable for next level of care    6/1/2021 3:35 PM EDT by Ana Maria Banegas      UP WITH ASSISTANCE    Routes    (X) * Oral hydration    6/1/2021 3:35 PM EDT by Ana Maria Banegas      ORAL HYDRATION    ( ) * Oral medications or regimen acceptable for next level of care    (X) * Oral diet or acceptable for next level of care    6/1/2021 3:35 PM EDT by Nohelia Salgado      DYSPHAGIA SOFT CARB CONTROL DIET    Interventions    (X) * Supplemental oxygen absent or at baseline requirement    2021 3:35 PM EDT by Nohelia Salgado      NO OXYGEN    (X) Neurologic checks    2021 3:35 PM EDT by Franklin Baez CHECKS Q4HRS    (X) Possible physical therapy    2021 3:35 PM EDT by Nohelia Salgado      PT EVAL AND TREAT    (X) Possible occupational and speech therapy    2021 3:35 PM EDT by Nohelia Salgado      OT/SPEECH EVAL AND TREAT    Medications    ( ) * Antithrombotic therapy appropriate for next level of care established    * Milestone   Additional Notes   VL DUP CAROTID BILATERAL- Duplex sonography with color flow enhancement was performed bilaterally on     the cervical carotid system. No evidence of hemodynamically significant     stenosis in the bilateral carotid and vertebral arteries.           GLUCOSE 165, CREAT 1.5, GFR 35, WBC 16.1, HGB 13.0, HCT 40.6         BP: 129/94, PULSE 91, RESP 18, TEMP 96.2, O2 SAT 94% ON RA            ASA 81MG PO DAILY   LOVENOX 100MG SUB Q DAILY   PEPCID 20MG IV DAILY   HUMALOG SLIDING SCALE 0-3 UNITS SUB Q QHS   HUMALOG SLIDING SCALE 0-6 UNITS SUB Q TID   LYRICA 150MG PO BID      DILAUDID 1MG IV Q4HRS PRN- HAD 1 DOSE   PERCOCET 10/325MG PO Q4HRS PRN- HAD 2 DOSES                Daily Progress Note       Date:2021   Patient: Neela Bridges  : 1955   SAAD:[id number]   CODE:Full Code No additional code details   PCP:No primary care provider on file.       Admit Date: 2021  2:12 AM    LOS: 2 days            Subjective:    63year-old female with history of multiple bilateral strokes, hypertension, hyperlipidemia, diabetes, cardiovascular disease, presented as transfer from outside facility due to concern for another stroke with acute severe left-sided weakness and dysarthria.  Echo showed positive bubble study with PFO and septal aneurysm.  She previously had right-sided weakness prior to presentation, which has improved.  MRI brain here shows acute infarcts in right periventricular white matter and right basal ganglia as well as old bilateral remote infarcts.  Given recurrent embolic strokes with PFO started on therapeutic anticoagulation with Lovenox.  Cardiology on board with consideration for PFO closure.  Held Plavix to allow washout for possible PFO closure.           No acute events overnight.  She appears to have improvement in her left-sided weakness.  Patient up and out of bed with assistance of nursing staff. Assessment/plan   Principal Problem:     Right hemisphere, cerebral infarction [facility])   Active Problems:     Acute left-sided weakness     Hypertension     CAD (coronary artery disease)     History of heart artery stent     COPD (chronic obstructive pulmonary disease) ([facility])     Dyspnea     Falls frequently     Gait instability     Hyperlipidemia associated with type 2 diabetes mellitus ([facility])     Morbid obesity ([facility])     CKD (chronic kidney disease) stage 3, GFR 30-59 ml/min     Narcotic habituation, continuous ([address]     Edentulous     Patent foramen ovale with atrial septal aneurysm     Nephropathy     Neuropathy     History of multiple strokes   Resolved Problems:     * No resolved hospital problems.  *           Acute right hemisphere strokes, small acute infarcts in right periventricular white matter and right basal ganglia   History of multiple bilateral strokes   -Continue neurochecks   -Allowed permissive hypertension   -PFO with septal aneurysm confirmed on echo with positive bubble study    -Carotid Dopplers without significant stenosis   -Continue therapeutic anticoagulation on Lovenox   -Continue aspirin   -held Plavix to allow washout prior to potential PFO closure   -Await further cardiology recommendation regarding timing of PFO closure        PT/OT/speech       Diabetes mellitus   Glucose monitoring with sliding scale insulin correction if needed       Hypertension   Hold losartan, allowing permissive hypertension        Leukocytosis, ? Reactive, low suspicion for infection   Monitor   No evidence of infection on UA   No acute findings on chest x-ray   Procalcitonin negative       Disposition: Pending plan for PFO closure and rehab assessment.  Plan to eventually discharged on Zio patch for outpatient cardiac monitoring to rule out A. fib           Sandee Amanda MD 2021 4:12 PM                      Neurology Progress Note   2021 10:06 AM   Information:   Patient Name:           Jerlene Sacks   :                           1955   Age:                            60 y.o. MRN:                           [id number]   Account #:                 [id number]   Admit Date:                2021   Today:                        21                  ADMIT DX:    Hemiplegia of left nondominant side as late effect of cerebrovascular disease (HCC)       Subjective:       Lazaro Valiente a 63y.o. year old woman with a history of hypertension, hyperlipidemia, diabetes, cardiovascular disease, and previous strokes who was transferred [facility]  after having yet another stroke.         Interval History:    Her left sided strength and coordination are improved.  She still has some tingling.  She complains of back and right leg pain.           Assessment:   1.         Right hemisphere strokes with history of left and right hemisphere strokes convincing for cardioembolism. 2.         PFO with septal aneurysm   3.         Hypertension   4.         Diabetes       Plan:   1.         Anticoagulation vs. PFO closure. 2.         NICS   3.         PT/OT/ST       Discharge planning: To be determined       Reviewed treatment plans with the patient and/or family.     25 minutes spent in face to face interaction and coordination of care.        Electronically signed by Ashlyn Middleton MD on 2021 at 10:06 AM                        Cardiology Daily Note Lex Moss MD Plavix to allow washout for possible PFO closure.           No acute events overnight.  Patient remains with left-sided weakness. Physical exam       General: No acute distress, obese, alert   Head: Normocephalic, atraumatic   Neck: Supple, nontender, trachea midline   Cardiovascular: Normal rate, pulses equal   Respiratory: Lungs clear to auscultation bilaterally   Abdomen: Soft, nontender, bowel sounds present   Extremities: No clubbing, cyanosis or edema   Skin: Warm and dry, no rashes   Neurologic: Left sided weakness, follows commands      P2Y12  214   A1C 9.1      CREAT 1.4      WBC 15.7   HGB 13.6         Assessment/plan   Principal Problem:     Hemiplegia of left nondominant side as late effect of cerebrovascular disease (HCC)   Active Problems:     Hypertension     CAD (coronary artery disease)     History of heart artery stent     COPD (chronic obstructive pulmonary disease) ([facility])     Dyspnea     Shortness of breath     Falls frequently     Gait instability     Hyperlipidemia associated with type 2 diabetes mellitus ([facility])     Morbid obesity ([facility])     CKD (chronic kidney disease) stage 3, GFR 30-59 ml/min     Narcotic habituation, continuous ([address]     Edentulous     CVA (cerebral vascular accident) ([address]     Patent foramen ovale with atrial septal aneurysm     Left hemiplegia ([address]     Nephropathy     Neuropathy   Resolved Problems:     * No resolved hospital problems.  *           Acute right hemisphere strokes, small acute infarcts in right periventricular white matter and right basal ganglia   History of multiple bilateral strokes   Continue neurochecks   Allow permissive hypertension   PFO with septal aneurysm confirmed on echo with positive bubble study    Continue therapeutic anticoagulation on Lovenox   Continue aspirin   D/C Plavix to allow washout prior to potential PFO closure   Follow-up carotid Dopplers   Await further cardiology recommendation regarding timing of PFO closure     effusion is noted.    No evidence of pleural effusion.    Structurally normal mitral valve with normal leaflet mobility. No evidence    of mitral valve stenosis or mild mitral regurgitation.    Aortic valve appears to be tricuspid.    Structurally normal aortic valve.    No significant aortic regurgitation or stenosis is noted.    Tricuspid valve is structurally normal.    No evidence of tricuspid regurgitation. Diet:   DIET DYSPHAGIA SOFT AND BITE-SIZED; Carb Control: 4 carb choices (60 gms)/meal; Dysphagia Soft and Bite-Sized       Examination:   Vitals: BP (!) 150/82   Pulse 69   Temp 97.6 °F (36.4 °C) (Temporal)   Resp 16   Ht 5' 5\" (1.651 m)   Wt 225 lb 3.2 oz (102.2 kg)   SpO2 92%   BMI 37.48 kg/m²        General appearance:  She is an obese woman who is alert and cooperative. Skin: Skin color, texture, turgor normal. No rashes or lesions   HEENT: Head: Normal, normocephalic, atraumatic. Neck:no adenopathy, no carotid bruit, no JVD, supple, symmetrical, trachea midline and thyroid not enlarged, symmetric, no tenderness/mass/nodules   Lungs: clear to auscultation bilaterally   Heart: regular rate and rhythm, S1, S2 normal, no murmur, click, rub or gallop   Extremities: extremities normal, atraumatic, no cyanosis or edema   Neurologic:  Alert, oriented.  No dysarthria.  Speech is fluent.  EOMI, PERRL, VFF.  Mild left lower facial weakness.  Limb strength testing shows mild left limb weakness.  Pronator drift positive on left. Rapid alternating movements are slow on left.  Finger to nose testing shows no dysmetria.       Assessment:   1.         Right hemisphere strokes with history of left and right hemisphere strokes convincing for cardioembolism. 2.         PFO with septal aneurysm   3.         Hypertension   4.         Diabetes       Plan:   1.         Anticoagulation vs. PFO closure.  Cardiology to see. 2.         NICS   3.         PT/OT/ST       Discharge planning:     To be determined [facility] called with positive blood cultures drawn at their facility on 5/28/2021. Positive cultures in the anaerobic bottle gram positive cocci and clusters. Hospitalist notified.       Electronically signed by Daniel Yanez LPN on 3/62/2813 at 6:19 PM         PHYSICAL THERAPY:   Bed Mobility   Supine to Sit Contact guard assistance   Transfers   Sit to Stand Contact guard assistance   Stand to sit Contact guard assistance   Ambulation   Ambulation? Yes   Ambulation 1   Surface level tile   Device Rolling Walker   Assistance Contact guard assistance   Quality of Gait patient states her legs feel shakey this morning   Distance 5'   Exercises   Hip Flexion 10   Hip Abduction 10   Knee Long Arc Quad 10   Ankle Pumps 10   Comments B LE ex's in sitting   Other Activities   Comment Patient states her legs feel shakey this morning but willing to get up to the chair for breakfast. Patient did well with short distance ambulation but did not feel like she could safely walk farther at this time.  Patient positioned for comfort in recliner with all needs in reach and breakfast set up.            Stroke: Ischemic - Care Day 1 (5/29/2021) by Donaldo Stewart RN       Review Entered Review Status   5/30/2021 10:18 Completed      Criteria Review      Care Day: 1 Care Date: 5/29/2021 Level of Care: Intermediate Care    Guideline Day 1    Level Of Care    (X) Stroke unit, ICU, telemetry, or floor    5/30/2021 11:18 AM EDT by Natasha Murillo      intermediate care    Clinical Status    (X) * Clinical Indications met    5/30/2021 11:18 AM EDT by Natasha Murillo      VS:  97.7, 16, 75, 170/100, 136/83, 96% RA, 220lb    (X) Cerebral hemorrhage excluded    5/30/2021 11:18 AM EDT by Natasha Murillo      excluded    Activity    ( ) Bed rest    5/30/2021 11:18 AM EDT by Natasha Murillo      up with assist,    Routes    (X) IV fluids    5/30/2021 11:18 AM EDT by Natasha Murillo      500ml NS IV Bolus,    (X) IV medications    5/30/2021 11:18 AM EDT by July Wright      mag 1G IV x1, dilaudid 1mg IV q4 prn given x4, KCl 10 mEq IV x1,    Interventions    (X) Neurologic checks    5/30/2021 11:18 AM EDT by July Wright      neurochecks q4,    (X) Neurology consultation    5/30/2021 11:18 AM EDT by July Wright      neuro cons below    (X) Laboratory studies    5/30/2021 11:18 AM EDT by July Wright      Na 141, K 3.7, BUN 13/ Creat 1.4, wbc 14.3 h/h 14.1/34.3,    (X) Cardiac monitoring    5/30/2021 11:18 AM EDT by July Wright      tele monitoring    (X) Imaging studies (ie, brain, intracranial vascular)    5/30/2021 11:18 AM EDT by July Wright      MRI brain: 1.  Small acute infarcts in the RIGHT periventricular white matter and RIGHT basal ganglia. 2.  Multiple bilateral remote infarcts. Medications    (X) Possible antithrombotic therapy    5/30/2021 11:18 AM EDT by July Wright      asa 81mg po qd,  lovenox 100mg sq qd    * Milestone   Additional Notes   5/29      Meds:  plavix 75mg po qd, klor-con 10 mEq po bid, percocet 10-325mg po q4 prn given x1      Tx: carotid duplex, dysphagia diet ccd, cardio cons, Full code,    OT eval, vitals per routine      HPI:   Patient transferred here from [facility]. Ama Murillo was seen there for slurred speech and dysarthria with left hemiplegia.  This is a recurrent stroke sx for her, she was seen there three weeks ago, with right hemiplegia and expressive aphasia and right hand numbness.        Physical Exam   Vitals and nursing note reviewed. Constitutional:        General: She is in acute distress.       Appearance: She is obese. She is ill-appearing.     HENT:       Head: Normocephalic and atraumatic.       Nose: Nose normal.       Mouth/Throat:       Comments: Poor dentition   Edentulous    Eyes:       Extraocular Movements: Extraocular movements intact.       Conjunctiva/sclera: Conjunctivae normal.    Cardiovascular:       Rate and Rhythm: Normal rate and regular rhythm.       Pulses: Normal pulses.       Heart sounds: Normal heart sounds. Pulmonary:       Breath sounds: Rales present. Abdominal:       Comments: Obese    Non distended   Non tender   No rebound no guarding     Musculoskeletal:       Right lower leg: Edema present.       Left lower leg: Edema present. Skin:      General: Skin is warm. Neurological:       Comments: Left sided hemiplegia    Dysarthria improved    She has slow deliberate speech    Crying when I saw her and she states she is in pain due back issues etc.        Plan   1.  Patient transferred here for recurrent and worsening neurological issues. Recurrent strokes. This time with left hemiplegia and dysarthria, but not aphasia   Mild facial droop resolved.        Previous strokes three weeks ago. Thought to be ischemic and embolic   Noted on evaluation mri to represent multiple sites in the past.    (right frontal and left posterior parietal ) with right hemiplegia, expressive aphasia and facial droop that resolved over the last three weeks and she then had recurrent sx today involving left side.        Cardiology work up and ray consistent with PFO with atrial septal anuerysm.        Sent to [facility] for higher level of care.        Will order mri of head and neck and start evaluation regarding current sx and consult neurology to help with management        Cardiology evaluation and opinion and regarding Patent foramen ovale with atrial septal anuerysm .     And consideration for closure.        Neuro checks.        Continue plavix and asa        Control bp    And permissive hypertension to avoid wide fluctuations in bp        2.  Hypertension chronic        3.  Type ii dm with complications    accuchecks and insulin sliding scale and hold off on long acting oral hypoglycemic agents.       4.  ckd and nephropathy    Hydrate and check follow up labs.        5.  Diffuse back pain and leg pain    And give pain meds to get her comfortable and minimize agitation       6.  dvt and gi prophylaxis.        7.  Copd and mild chronic hypoxemia    And oxygen dependent.        8.  Leukocytosis    And chronic elevation for years per chart    And will hold off on abx as no specific source is noted and monitor. Consider heme evaluation while here       Neuro:   IMPRESSION:   1.         Right hemisphere strokes with history of left and right hemisphere strokes convincing for cardioembolism. 2.         PFO with septal aneurysm   3.         Hypertension   4.         Diabetes       PLAN:   1.         She needs anticoagulation.  Will increase Lovenox, check P2Y12 and stop Plavix for now.  Can always stop anticoagulation and go back to Plavix if she has PFO closure. 2.         NICS.  I cannot find any carotid study reports. Vinay Bradshaw is mention of ordering CTAs but no report and with her CKD it may not have been done.     3.         Additional labs   4.         PT/OT/ST      Stroke: Ischemic - Clinical Indications for Admission to Inpatient Care by Julaino David RN       Review Entered Review Status   5/30/2021 10:15 Completed      Criteria Review      Clinical Indications for Admission to Inpatient Care    Most Recent : Elmer Sousa Most Recent Date: 5/30/2021 11:15 AM EDT    (X) Admission is indicated for  1 or more  of the following  [A] [B] (1) (3) (4) (5) (6):       (X) Acute ischemic stroke [A] with neurologic findings that warrant inpatient care, as indicated       by  1 or more  of the following :          (X) Automatic Data of Health Stroke Scale (NIHSS) score greater than 2 [C]          5/30/2021 11:15 AM EDT by Elmer Sousa            inital NIHSS 11          (X) Significant arm or leg weakness (eg, limiting movement against gravity)          5/30/2021 11:15 AM EDT by Elmer Sousa            left hemiplegia.

## 2021-06-09 NOTE — PROGRESS NOTES
Nutrition Assessment     Type and Reason for Visit: Reassess, NPO/Clear Liquid    Nutrition Recommendations/Plan:  Recommend NGT placement and/or GI consult for PEG placement per MD and SLP to initiate JOSÉ to meet pt's nutritional needs. EN reommendations: Glucerna 1.5 Diabetic formula @ 40 mL/hr to provide 1440 kcals, 79 g/PRO, 128 g/CHO, and 729 mL/fluid daily. Nutrition Assessment:  Pt NPO day 7. No new weight since 6/5. Malnutrition Assessment:  Malnutrition Status: At risk for malnutrition (Comment)    Current Nutrition Therapies:    Diet NPO Exceptions are: Sips with Meds    Anthropometric Measures:  · Height: 5' 5\" (165.1 cm)  · Current Body Wt:  (No new weight since 6/5)   · BMI: 36.9    Nutrition Interventions:   Food and/or Nutrient Delivery:  Continue NPO, Start Tube Feeding  Coordination of Nutrition Care:  Continue to monitor while inpatient    Goals:  Pt will progress to an oral diet to meet nutritional needs or needs will be met with JOÉS       Nutrition Monitoring and Evaluation:   Food/Nutrient Intake Outcomes:  Diet Advancement/Tolerance  Physical Signs/Symptoms Outcomes:  Biochemical Data, Nutrition Focused Physical Findings, Weight     Discharge Planning:     Too soon to determine     Electronically signed by Eneida Rutledge, MS, RD, LD on 6/9/21 at 1:59 PM CDT    Contact: [phone]

## 2021-06-09 NOTE — PROGRESS NOTES
[address], [address]  [place], [place]  Phone [phone]     Neurology Progress Note  2021 10:27 AM  Information:   Patient Name: Kimi Vargas  :   1955  Age:   63 y.o. MRN:   [id number]  Account #:  [de-identified]  Admit Date:   2021  Today:  21     ADMIT DX:   Right hemisphere, cerebral infarction [facility])    Subjective:     Melissa Reinoso a 63y.o. year old woman with a history of hypertension, hyperlipidemia, diabetes, cardiovascular disease, and previous strokes who was transferred from [facility]  after having yet another stroke with left sided weakness.  She recovered fairly well and had a PFO closure on .  That evening she became minimally responsive.      Interval History:   No changes. She remains largely obtunded. She moans or cries when moved or touched. She does not open eyes or follow commands. Objective:     Past Medical History:  Past Medical History:   Diagnosis Date    [facility]erebral artery occlusion with cerebral infarction ([facility]     COPD (chronic obstructive pulmonary disease) (HCC)     Diabetes mellitus ([facility]     Hypertension     Neuromuscular disorder ([facility]     Palliative care patient 2021       Past Surgical History:   Procedure Laterality Date    ABDOMEN SURGERY      gastric resection    APPENDECTOMY      CARDIAC SURGERY       SECTION      x2    CHOLECYSTECTOMY      CORONARY ANGIOPLASTY      SPLENECTOMY         No family history on file. Social History     Socioeconomic History    Marital status:       Spouse name: Not on file    Number of children: Not on file    Years of education: Not on file    Highest education level: Not on file   Occupational History    Not on file   Tobacco Use    Smoking status: Former Smoker     Packs/day: 1.00     Years: 28.00     Pack years: 28.00     Types: Cigarettes     Quit date: 2019     Years since quittin.4    Smokeless tobacco: Never Used   Vaping Use    Ref Range    POC Glucose 88 70 - 99 mg/dl    Performed on AccuChek    POCT Glucose    Collection Time: 06/08/21  3:58 PM   Result Value Ref Range    POC Glucose 99 70 - 99 mg/dl    Performed on AccuChek    POCT Glucose    Collection Time: 06/08/21  9:57 PM   Result Value Ref Range    POC Glucose 108 (H) 70 - 99 mg/dl    Performed on AccuChek    Basic Metabolic Panel w/ Reflex to MG    Collection Time: 06/09/21  4:55 AM   Result Value Ref Range    Sodium 140 136 - 145 mmol/L    Potassium reflex Magnesium 3.2 (L) 3.5 - 5.0 mmol/L    Chloride 106 98 - 111 mmol/L    CO2 24 22 - 29 mmol/L    Anion Gap 10 7 - 19 mmol/L    Glucose 108 74 - 109 mg/dL    BUN 14 8 - 23 mg/dL    CREATININE 0.8 0.5 - 0.9 mg/dL    GFR Non-African American >60 >60    GFR African American >59 >59    Calcium 8.5 (L) 8.8 - 10.2 mg/dL   CBC    Collection Time: 06/09/21  4:55 AM   Result Value Ref Range    WBC 13.6 (H) 4.8 - 10.8 K/uL    RBC 3.26 (L) 4.20 - 5.40 M/uL    Hemoglobin 10.5 (L) 12.0 - 16.0 g/dL    Hematocrit 31.4 (L) 37.0 - 47.0 %    MCV 96.3 81.0 - 99.0 fL    MCH 32.2 (H) 27.0 - 31.0 pg    MCHC 33.4 33.0 - 37.0 g/dL    RDW 13.7 11.5 - 14.5 %    Platelets 526 553 - 491 K/uL    MPV 11.4 9.4 - 12.3 fL   Valproic acid level, total    Collection Time: 06/09/21  4:55 AM   Result Value Ref Range    Valproic Acid Lvl 104.1 (HH) 50.0 - 100.0 ug/mL   PHENYTOIN LEVEL, TOTAL    Collection Time: 06/09/21  4:55 AM   Result Value Ref Range    Phenytoin Lvl 5.2 (L) 10.0 - 20.0 ug/mL   Magnesium    Collection Time: 06/09/21  4:55 AM   Result Value Ref Range    Magnesium 1.4 (L) 1.6 - 2.4 mg/dL   POCT Glucose    Collection Time: 06/09/21  8:08 AM   Result Value Ref Range    POC Glucose 82 70 - 99 mg/dl    Performed on AccuChek        Diet:  Diet NPO Exceptions are: Sips with Meds    Examination:  Vitals: BP (!) 140/78   Pulse 67   Temp 96.5 °F (35.8 °C) (Temporal)   Resp 20   Ht 5' 5\" (1.651 m)   Wt 222 lb 4.8 oz (100.8 kg)   SpO2 99%   BMI 36.99 kg/m²      Intake/Output Summary (Last 24 hours) at 6/9/2021 1027  Last data filed at 6/9/2021 0010  Gross per 24 hour   Intake 560 ml   Output 250 ml   Net 310 ml     General appearance: She is obtunded and moaning. HEENT: Exam; heent: Head: Normal, normocephalic, atraumatic.  No nuchal rigidity  Lungs: clear to auscultation bilaterally  Heart: regular rate and rhythm, S1, S2 normal, no murmur, click, rub or gallop  Abdomen: soft, non-tender; bowel sounds normal; no masses,  no organomegaly  Extremities: extremities normal, atraumatic, no cyanosis or edema  Neurologic:  Obtunded.  Moaning. No response to voice.  She starts crying with light stimulation or passive head or limb movement.  She does not open eyes, follow commands, or answer questions.  She does not talk.  No facial asymmetry noted.  PERRL. She blinks to threat. She moves both arms slowly and both lower extremities.  She localizes and with and withdraws the left arm.  She moves both legs purposefully.      Assessment:   1.         Encephalopathy.  CTs, MRIs, EEGs, labs unrevealing for etiology. Anticonvulsants, low dose opioid (?withdrawal) not helpful. .    2.         Recent right hemisphere strokes with history of left and right hemisphere strokes convincing for cardioembolism.  The P2Y12 plt inh test indicates she is a Plavix nonresponder   3.         PFO with septal aneurysm s/p closure device placement  4.         Hypertension  5.         Diabetes  6.         Myelodysplasia with chronic leukocytosis    Plan:   1. LP  2. Adjust Pht and VPA  3. Considerations regarding code status, nutrition, discharge planning. I have not seen any family here. Discharge planning: To be determined    Reviewed treatment plans with the patient and/or family. 35 minutes spent in face to face interaction and coordination of care.      Electronically signed by Radha Gutierrez MD on 6/9/2021 at 10:27 AM

## 2021-06-09 NOTE — PROGRESS NOTES
Cumulative Hospital Course:    Patient transferred from [facility] to this facility on 5/29/2021 with acute strokelike symptoms. MRI brain at that time demonstrated right-sided small acute infarcts as well as multiple bilateral remote infarcts. Patient underwent PFO closure on 6/2/2021 per Dr. Ade Abdi. Neurology work-up ongoing for persistent encephalopathy. PT/OT/SLP continue to follow.       Lázaro Mosher MD  6/9/2021 Situation: Ju called with questions regarding the Coronavirus vaccine.     Garcia Assessments: Ju called to provide. States she was seen in Urgent care last Friday due to abdominal  pain. Has history of diverticulitis. States she is taking Ciproand Flagyl as ordered and is feeling better. Denies nausea or vomiting. Appetite is increasing. She is eating small meals and drinking fluids.     Actions Taken: RNCC answered questions related to when she can get Covid vaccine. Assisted in registering Ju with [employer] for Covid vaccine registation. I advised she sign up with [employer] and provided her the technical support phone number.     Plan: Follow up in 1  weeks. Discuss goals. Needs to complete  AD- she was not sure if she has a copy or needs to complete at new one. Review action plan.

## 2021-06-09 NOTE — PROGRESS NOTES
Follow up:  Supportive visit. Pt was sleeping soundly and I did not wake. Report from nursing and review of chart. She remains encephalopathic and cries out when touched or moved. Neuro following, LP planned. Continue work up and treatment. Met with discharge planner and will attempt to have her son come up for a meeting to discuss options of care. Phone call to pt's son, Rosa Ordonez and invited him to come. SW will arrange transportation to get him here. He is aware we need his help and input on his mother's care. He became tearful and expressed, \"I suffer from anxiety and depression, and have not wanted to see her, because it will break my heart. \"  Support to him and thanked him for being willing to assist in planning of goals of care.         Electronically signed by Laura Knapp RN on 6/9/2021 at 2:08 PM

## 2021-06-10 NOTE — PROGRESS NOTES
Vaping Use: Never used   Substance and Sexual Activity    Alcohol use: Not Currently    Drug use: Never    Sexual activity: Not Currently   Other Topics Concern    Not on file   Social History Narrative    Not on file     Social Determinants of Health     Financial Resource Strain:     Difficulty of Paying Living Expenses:    Food Insecurity:     Worried About Running Out of Food in the Last Year:     [address] in the Last Year:    Transportation Needs:     Lack of Transportation (Medical):      Lack of Transportation (Non-Medical):    Physical Activity:     Days of Exercise per Week:     Minutes of Exercise per Session:    Stress:     Feeling of Stress :    Social Connections:     Frequency of Communication with Friends and Family:     Frequency of Social Gatherings with Friends and Family:     Attends [employer] Services:     Active Member of Clubs or Organizations:     Attends Club or Organization Meetings:     Marital Status:    Intimate Partner Violence:     Fear of Current or Ex-Partner:     Emotionally Abused:     Physically Abused:     Sexually Abused:        Medications:   sodium chloride 100 mL/hr at 06/09/21 2250    sodium chloride      dextrose        valproate sodium (DEPACON) IVPB  1,000 mg Intravenous Q12H    fosphenytoin  100 mg PE Intravenous Q8H    lacosamide (VIMPAT) IVPB  100 mg Intravenous BID    [Held by provider] ticagrelor  90 mg Oral BID    metoprolol  5 mg Intravenous Q6H    sodium chloride flush  5-40 mL Intravenous 2 times per day    bisacodyl  10 mg Rectal Daily    magnesium hydroxide  30 mL Oral Daily    aspirin  81 mg Oral Daily    insulin lispro  0-6 Units Subcutaneous TID WC    insulin lispro  0-3 Units Subcutaneous Nightly       Diagnostic Studies:  Reviewed all labs/diagnositcs since last 24hrs:  Recent Results (from the past 24 hour(s))   Protime-INR    Collection Time: 06/09/21 10:43 AM   Result Value Ref Range    Protime 14.6 12.0 - 14.6 sec    INR 1.14 0.88 - 1.18   APTT    Collection Time: 06/09/21 10:43 AM   Result Value Ref Range    aPTT 30.1 26.0 - 36.2 sec   POCT Glucose    Collection Time: 06/09/21 12:00 PM   Result Value Ref Range    POC Glucose 86 70 - 99 mg/dl    Performed on AccuChek    CSF CELL COUNT WITH DIFFERENTIAL    Collection Time: 06/09/21  2:50 PM   Result Value Ref Range    Color, CSF Colorless Colorless    Appearance, CSF Clear Clear    Tube Number + CELL CT + DIFF-CSF Tube 2     Clot Evaluation CSF see below     WBC, CSF 2 0 - 8 /cumm    RBC, CSF 21 (H) 0 - 5 /cumm    No differential CSF see below    GLUCOSE, CSF    Collection Time: 06/09/21  2:50 PM   Result Value Ref Range    Glucose, CSF 65 40 - 70 mg/dL   PROTEIN, CSF    Collection Time: 06/09/21  2:50 PM   Result Value Ref Range    Protein, CSF 45 15 - 45 mg/dL   Meningitis/Encephalitis Panel, CSF    Collection Time: 06/09/21  2:50 PM    Specimen: CSF   Result Value Ref Range    Cryptococcus neoformans/gattii CSF by PCR Not Detected Not Detected    Cytomegalovirus CSF by PCR Not Detected Not Detected    Enterovirus CSF by PCR Not Detected Not Detected    Escherichia coli K1 CSF by PCR Not Detected Not Detected    Haemophilus influenzae CSF by PCR Not Detected Not Detected    Herpes simplex virus 1 CSF by PCR Not Detected Not Detected    Herpes simplex virus 2 CSF by PCR Not Detected Not Detected    Human herpesvirus 6 CSF by PCR Not Detected Not Detected    Human parechovirus CSF by PCR Not Detected Not Detected    Listeria monocytogenes CSF by PCR Not Detected Not Detected    Neisseria meningitidis CSF by PCR Not Detected Not Detected    Streptococcus agalactiae CSF by PCR Not Detected Not Detected    Streptococcus pneumoniae CSF by PCR Not Detected Not Detected    Varicella zoster virus CSF by PCR Not Detected Not Detected   POCT Glucose    Collection Time: 06/09/21  5:14 PM   Result Value Ref Range    POC Glucose 97 70 - 99 mg/dl    Performed on AccuChek    POCT Glucose    Collection Time: 06/09/21 10:00 PM   Result Value Ref Range    POC Glucose 92 70 - 99 mg/dl    Performed on AccuChek    Basic Metabolic Panel w/ Reflex to MG    Collection Time: 06/10/21  4:20 AM   Result Value Ref Range    Sodium 140 136 - 145 mmol/L    Potassium reflex Magnesium 3.4 (L) 3.5 - 5.0 mmol/L    Chloride 105 98 - 111 mmol/L    CO2 23 22 - 29 mmol/L    Anion Gap 12 7 - 19 mmol/L    Glucose 94 74 - 109 mg/dL    BUN 11 8 - 23 mg/dL    CREATININE 0.8 0.5 - 0.9 mg/dL    GFR Non-African American >60 >60    GFR African American >59 >59    Calcium 8.7 (L) 8.8 - 10.2 mg/dL   PHENYTOIN LEVEL, TOTAL    Collection Time: 06/10/21  4:20 AM   Result Value Ref Range    Phenytoin Lvl 9.9 (L) 10.0 - 20.0 ug/mL   CBC Auto Differential    Collection Time: 06/10/21  4:20 AM   Result Value Ref Range    WBC 13.4 (H) 4.8 - 10.8 K/uL    RBC 3.61 (L) 4.20 - 5.40 M/uL    Hemoglobin 11.2 (L) 12.0 - 16.0 g/dL    Hematocrit 34.3 (L) 37.0 - 47.0 %    MCV 95.0 81.0 - 99.0 fL    MCH 31.0 27.0 - 31.0 pg    MCHC 32.7 (L) 33.0 - 37.0 g/dL    RDW 13.8 11.5 - 14.5 %    Platelets 419 671 - 812 K/uL    MPV 11.5 9.4 - 12.3 fL    Neutrophils % 60.2 50.0 - 65.0 %    Lymphocytes % 24.6 20.0 - 40.0 %    Monocytes % 11.1 (H) 0.0 - 10.0 %    Eosinophils % 3.1 0.0 - 5.0 %    Basophils % 0.5 0.0 - 1.0 %    Neutrophils Absolute 8.1 (H) 1.5 - 7.5 K/uL    Immature Granulocytes # 0.1 K/uL    Lymphocytes Absolute 3.3 1.1 - 4.5 K/uL    Monocytes Absolute 1.50 (H) 0.00 - 0.90 K/uL    Eosinophils Absolute 0.40 0.00 - 0.60 K/uL    Basophils Absolute 0.10 0.00 - 0.20 K/uL   Magnesium    Collection Time: 06/10/21  4:20 AM   Result Value Ref Range    Magnesium 1.8 1.6 - 2.4 mg/dL   Comprehensive Metabolic Panel    Collection Time: 06/10/21  4:20 AM   Result Value Ref Range    Total Protein 6.5 (L) 6.6 - 8.7 g/dL    Albumin 3.2 (L) 3.5 - 5.2 g/dL    Total Bilirubin 0.5 0.2 - 1.2 mg/dL    Alkaline Phosphatase 120 (H) 35 - 104 U/L    ALT 6 5 - 33 U/L AST 15 5 - 32 U/L   POCT Glucose    Collection Time: 06/10/21  6:37 AM   Result Value Ref Range    POC Glucose 103 (H) 70 - 99 mg/dl    Performed on AccuChek        Diet:  Diet NPO Exceptions are: Sips with Meds    Examination:  Vitals: BP (!) 146/88   Pulse 68   Temp 96 °F (35.6 °C) (Temporal)   Resp 16   Ht 5' 5\" (1.651 m)   Wt 222 lb 4.8 oz (100.8 kg)   SpO2 96%   BMI 36.99 kg/m²      Intake/Output Summary (Last 24 hours) at 6/10/2021 0909  Last data filed at 6/10/2021 0548  Gross per 24 hour   Intake 2064 ml   Output 2750 ml   Net -686 ml     General appearance: She is obtunded and moaning. HEENT: Exam; heent: Head: Normal, normocephalic, atraumatic.  No nuchal rigidity  Lungs: clear to auscultation bilaterally  Heart: regular rate and rhythm, S1, S2 normal, no murmur, click, rub or gallop  Abdomen: soft, non-tender; bowel sounds normal; no masses,  no organomegaly  Extremities: extremities normal, atraumatic, no cyanosis or edema  Neurologic:  Obtunded.  Moaning.  No response to voice.  She starts crying with light stimulation or passive head or limb movement.  She does not open eyes, follow commands, or answer questions.  She does not talk.  No facial asymmetry noted.  PERRL.  She blinks to threat.  She moves both arms slowly and both lower extremities.  She localizes and with and withdraws the left arm.  She moves both legs purposefully.      Assessment:   1.         Encephalopathy.  CTs, MRIs, EEGs, labs unrevealing for etiology.  Anticonvulsants, low dose opioid (?withdrawal) not helpful.  CSF was normal.  2.         Recent right hemisphere strokes with history of left and right hemisphere strokes convincing for cardioembolism.  The P2Y12 plt inh test indicates she is a Plavix nonresponder   3.         PFO with septal aneurysm s/p closure device placement  4.         Hypertension  5.         Diabetes  6.         Myelodysplasia with chronic leukocytosis    Plan:   1. Adjust anticonvulsant doses.   On Depakote, Dilantin, and Vimpat. Discharge planning: To be determined    Reviewed treatment plans with the patient and/or family. 35 minutes spent in face to face interaction and coordination of care.      Electronically signed by Isabel Bryson MD on 6/10/2021 at 9:23 AM

## 2021-06-10 NOTE — PROGRESS NOTES
Hospitalist Progress Note  [facility]     Patient: Kaity Riggs  : 1955  MRN: [id number]  Code Status: Full Code    Hospital Day: 12   Date of Service: 6/10/2021    Subjective:   Patient seen and examined. Resting comfortably. No acute distress    Past Medical History:   Diagnosis Date    Cerebral artery occlusion with [facility] infarction ([facility] [facility]     COPD (chronic obstructive pulmonary disease) (HCC)     Diabetes mellitus ([facility] [address] [facility]     Hypertension     Neuromuscular disorder ([facility] [address] [facility]     Palliative care patient 2021       Past Surgical History:   Procedure Laterality Date    ABDOMEN SURGERY      gastric resection    APPENDECTOMY      CARDIAC SURGERY       SECTION      x2    CHOLECYSTECTOMY      CORONARY ANGIOPLASTY      SPLENECTOMY         No family history on file. Social History     Socioeconomic History    Marital status:      Spouse name: Not on file    Number of children: Not on file    Years of education: Not on file    Highest education level: Not on file   Occupational History    Not on file   Tobacco Use    Smoking status: Former Smoker     Packs/day: 1.00     Years: 28.00     Pack years: 28.00     Types: Cigarettes     Quit date:      Years since quittin.4    Smokeless tobacco: Never Used   Vaping Use    Vaping Use: Never used   Substance and Sexual Activity    Alcohol use: Not Currently    Drug use: Never    Sexual activity: Not Currently   Other Topics Concern    Not on file   Social History Narrative    Not on file     Social Determinants of Health     Financial Resource Strain:     Difficulty of Paying Living Expenses:    Food Insecurity:     Worried About [address] in the Last Year:     [address] in the Last Year:    Transportation Needs:     Lack of Transportation (Medical):      Lack of Transportation (Non-Medical):    Physical Activity:     Days of Exercise per Week:     Minutes of Exercise per Session:    Stress:     Feeling of Stress :    Social Connections:     Frequency of Communication with Friends and Family:     Frequency of Social Gatherings with Friends and Family:     Attends [employer] Services:     Active Member of Clubs or Organizations:     Attends Club or Organization Meetings:     Marital Status:    Intimate Partner Violence:     Fear of Current or Ex-Partner:     Emotionally Abused:     Physically Abused:     Sexually Abused:        Current Facility-Administered Medications   Medication Dose Route Frequency Provider Last Rate Last Admin    fosphenytoin (CEREBYX) 600 mg PE in dextrose 5 % 100 mL IVPB (loading dose)  600 mg PE Intravenous Once Bennett Saldana MD        lacosamide (VIMPAT) 200 mg in dextrose 5 % 70 mL IVPB  200 mg Intravenous BID Bennett Saldana MD        valproate (DEPACON) 1,000 mg in dextrose 5 % 100 mL IVPB  1,000 mg Intravenous Q12H Bennett Saldana MD   Stopped at 06/10/21 0521    fosphenytoin (CEREBYX) injection 100 mg PE  100 mg PE Intravenous Q8H Bennett Saldana MD   100 mg PE at 06/10/21 0335    [Held by provider] ticagrelor (BRILINTA) tablet 90 mg  90 mg Oral BID Feliberto Lance MD   90 mg at 06/04/21 1144    LORazepam (ATIVAN) injection 0.5 mg  0.5 mg Intravenous Q4H PRN Chuck Diallo MD   0.5 mg at 06/06/21 0852    metoprolol (LOPRESSOR) injection 5 mg  5 mg Intravenous Q6H Prateek Us DO   5 mg at 06/10/21 5629    hydrALAZINE (APRESOLINE) injection 5 mg  5 mg Intravenous Q4H PRN Yeni Muir MD   5 mg at 06/04/21 1706    0.9 % sodium chloride infusion   Intravenous Continuous Yeni Muir  mL/hr at 06/09/21 2250 New Bag at 06/09/21 2250    perflutren lipid microspheres (DEFINITY) injection 1.65 mg  1.5 mL Intravenous ONCE PRN ALEXYS Dee - CNP        sodium chloride flush 0.9 % injection 5-40 mL  5-40 mL Intravenous 2 times per day ALEXYS Otto - CNP   10 mL at 06/09/21 2044    sodium chloride flush 0.9 % injection 5-40 mL  5-40 mL Intravenous PRN ALEXYS Baig CNP   10 mL at 06/08/21 0341    0.9 % sodium chloride infusion  25 mL Intravenous PRN ALEXYS Baig CNP        acetaminophen (TYLENOL) tablet 650 mg  650 mg Oral Q4H PRN ALEXYS Baig CNP        bisacodyl (DULCOLAX) suppository 10 mg  10 mg Rectal Daily Fabio Fisher MD   10 mg at 06/09/21 0904    magnesium hydroxide (MILK OF MAGNESIA) 400 MG/5ML suspension 30 mL  30 mL Oral Daily Fabio Fisher MD   30 mL at 06/04/21 1144    polyethylene glycol (GLYCOLAX) packet 17 g  17 g Oral Daily PRN Mely Anne MD        acetaminophen (TYLENOL) suppository 650 mg  650 mg Rectal Q6H PRN Mely Anne MD        potassium chloride (KLOR-CON M) extended release tablet 40 mEq  40 mEq Oral PRN Mely Anne MD        Or    potassium bicarb-citric acid (EFFER-K) effervescent tablet 40 mEq  40 mEq Oral PRN Mely Anne MD        Or    potassium chloride 10 mEq/100 mL IVPB (Peripheral Line)  10 mEq Intravenous PRN Mely Anne MD        potassium chloride 10 mEq/100 mL IVPB (Peripheral Line)  10 mEq Intravenous PRN Mely Anne  mL/hr at 06/10/21 0627 10 mEq at 06/10/21 0627    ondansetron (ZOFRAN) injection 4 mg  4 mg Intravenous Q6H PRN Mely Anne MD        aspirin chewable tablet 81 mg  81 mg Oral Daily Mely Anne MD   81 mg at 06/04/21 1144    insulin lispro (HUMALOG) injection vial 0-6 Units  0-6 Units Subcutaneous TID WC Mely Anne MD   1 Units at 06/04/21 1706    insulin lispro (HUMALOG) injection vial 0-3 Units  0-3 Units Subcutaneous Nightly Mely Anne MD   1 Units at 06/05/21 0822    glucose (GLUTOSE) 40 % oral gel 15 g  15 g Oral PRN Mely Anne MD        dextrose 50 % IV solution  12.5 g Intravenous PRN Clare Carranza MD Demetri        glucagon (rDNA) injection 1 mg  1 mg Intramuscular PRN Jose Fischer MD        dextrose 5 % solution  100 mL/hr Intravenous PRN Jose Fischer MD             sodium chloride 100 mL/hr at 06/09/21 2250    sodium chloride      dextrose          Objective:   BP (!) 146/88   Pulse 68   Temp 96 °F (35.6 °C) (Temporal)   Resp 16   Ht 5' 5\" (1.651 m)   Wt 222 lb 4.8 oz (100.8 kg)   SpO2 96%   BMI 36.99 kg/m²     General: no acute distress  HEENT: normocephalic, atraumatic  Neck: supple, symmetrical, trachea midline   Lungs: clear to auscultation bilaterally   Cardiovascular: s1 and s2 normal  Abdomen: soft, positive bowel sounds  Extremities: no edema or cyanosis   Neuro: encephalopathic    Recent Labs     06/08/21  0345 06/09/21  0455 06/10/21  0420   WBC 16.5* 13.6* 13.4*   RBC 3.31* 3.26* 3.61*   HGB 10.5* 10.5* 11.2*   HCT 32.0* 31.4* 34.3*   MCV 96.7 96.3 95.0   MCH 31.7* 32.2* 31.0   MCHC 32.8* 33.4 32.7*    236 237     Recent Labs     06/08/21 0345 06/09/21 0455 06/10/21  0420    140 140   K 3.5 3.2* 3.4*   ANIONGAP 10 10 12    106 105   CO2 25 24 23   BUN 16 14 11   CREATININE 0.9 0.8 0.8   GLUCOSE 100 108 94   CALCIUM 8.4* 8.5* 8.7*     Recent Labs     06/08/21 0345 06/09/21  0455 06/10/21  0420   MG 1.4* 1.4* 1.8     Recent Labs     06/10/21  0420   AST 15   ALT 6   BILITOT 0.5   ALKPHOS 120*     No results for input(s): PH, PO2, PCO2, HCO3, BE, O2SAT in the last 72 hours. No results for input(s): TROPONINI in the last 72 hours. Recent Labs     06/09/21  1043   INR 1.14     No results for input(s): LACTA in the last 72 hours. Intake/Output Summary (Last 24 hours) at 6/10/2021 1037  Last data filed at 6/10/2021 0548  Gross per 24 hour   Intake 2064 ml   Output 2750 ml   Net -686 ml       CT Head WO Contrast    Result Date: 6/8/2021  Examination. CT HEAD WO CONTRAST 6/8/2021 11:38 AM History: Left hemisphere stroke. DLP: 559 mGycm. The CT scan of the head is performed without intravenous contrast enhancement. The images are acquired in axial plane with subsequent reconstruction in coronal and sagittal planes. The comparison is made with the previous study dated 6/3/2021. The scattered areas of decreased attenuation in the right frontal lobe, right paraventricular white matter and left frontal periventricular white matter are reidentified. These represent areas of subacute or chronic ischemia/infarction and there is no change in the previous study. No new lesions are seen. There is no evidence of intracranial hemorrhage or hematoma. There is no evidence of mass or midline shift. Moderately prominent ventricles, basal cistern and the cortical sulci are similar to the previous study suggestive chronic volume loss. Gray-white matter differentiation is maintained. The images reviewed in bone window show no acute bony abnormality. There is mild mucosal thickening involving the left sphenoid sinus. No acute intracranial abnormality. Areas of subacute or chronic ischemia/infarction bilaterally as detailed above. No change. Signed by Dr Paul Anthony on 6/8/2021 1:08 PM    FL LUMBAR PUNCTURE DIAG    Result Date: 6/9/2021  Examination. FL LUMBAR PUNCTURE DIAG 6/9/2021 1:21 PM History: Encephalopathy. The procedure have been explained to the patient's caretaker/guardian and informed consent had been obtained. The patient is placed on the fluoroscopy table in prone position and the lumbar spine is examined. A suitable spot opposite interspace L2-3 is selected for puncture. The spot was marked with an ink marker. After sterile preparation and application of local anesthesia a size 20-gauge spinal needle was introduced into the thecal sac. A FreeFlow of clear colorless thin fluid was established. The patient was placed in decubitus position and the outer hub of the spinal needle was attached to a pressure measuring device.  The opening pressure was measured which is 12.5 cm water. Subsequently 13 mL of CSF was removed and dispensed in 2 separate test tubes which were sent to the laboratory for further evaluation as instructed by the attending physician. The needle was then withdrawn and the puncture site was covered with a sterile Band-Aid. The patient tolerated the procedure well. The post lumbar puncture care instructions were given to the nurse in charge of the floor. A successful lumbar puncture and opening pressure measurement. Fluoroscopy time: 1 minute 16 seconds. Dose: 0.71 8.mGym2.  Signed by Dr Ron Ruiz on 6/9/2021 4:21 PM       Assessment and Plan:   Persistent encephalopathy  MRI brain 5/29/2021: Right-sided small acute infarcts, multiple bilateral remote infarcts  Neurology following  Extensive work-up in progress  S/p PFO closure 6/2/2021 per Dr. Brooks Rivera     DM2  Meds on board     DVT prophylaxis  SCDs    Nico Eagle MD   6/10/2021 10:37 AM

## 2021-06-10 NOTE — PROGRESS NOTES
06/10/21 3323   General Comment   Comments Attempt ROD Guerrier said  to not disturb patient .   She stated patient is unable to follow commands   Physical Therapy    Electronically signed by Kath Pardo PTA on 6/10/2021 at 4:49 PM

## 2021-06-10 NOTE — PROGRESS NOTES
I received a call from pt's son Vijay Kaba, that he was unable to make it for a visit or meeting today, due to back pain. He is hopeful for his mother's best friend to bring him here tomorrow. He states it's the closest thing to family he has other than his mom. I feel it may be better for him to be supported during his visit and discussing goals of care, so hopefully this will work out. He did say they had discussed, code status as we had requested and he wishes for pt to be DNR. Vijay Kaba states, \"I heard my mother and uncle talking in the past and she said she came in this world naturally and she would want die naturally. \"  He affirms wishes for no CPR and no intubation. He does want aggressive treatment up to that point. Provided emotional support, and will plan family meeting for tomorrow.     Electronically signed by Jose Luis Hernandez RN on 6/10/2021 at 9:44 AM

## 2021-06-11 PROBLEM — I69.315 COGNITIVE SOCIAL OR EMOTIONAL DEFICIT FOLLOWING CEREBRAL INFARCTION: Status: ACTIVE | Noted: 2021-01-01

## 2021-06-11 NOTE — PROGRESS NOTES
Met with pt's son, Rosa Ordonez, and pt's friend along with palliative care nurse Laura Knapp, pt's nurse, Dami Elmore, and Tito Jackson, and Vivek Rodrigez [place]. This meeting was called to speak to pt's son about pt's condition and talk about treatment options. Khalida, direct care provider, gave a synopsis of pt's condition. Mike Gentile spoke about treatment options including discussing Hospice, and asked pt's son what further treatment he would want. Pt's son Rosa Ordonez said he wanted his mom to be kept comfortable. After providing treatment option scenarios, pt's son agreed to Hospice. Pt's nurse provided support for son and then all others left except this , pt's son and friend. This  provided spiritual care with sustaining presence, support, and prayer. Pt's son expressed gratitude to the [facility] for all their care for his mom and to this  directly for spiritual care.     Electronically signed by Sathish Archer on 6/11/2021 at 2:33 PM

## 2021-06-11 NOTE — DISCHARGE SUMMARY
Hospitalist Discharge Summary  [facility]    Patient: Debra Anand  : 1955  MRN: [id number]  Code Status: DNR-CC  PCP: Kimberley Cadena MD  Attending: Марина Israel MD  Admission Date: 2021  Discharge Date: 2021  Length of Stay: 13 days    Discharge Medications:   As per hospice    Hospital Course:   Persistent encephalopathy  Acute right-sided strokes  Multiple bilateral chronic strokes  Patent foramen ovale closure  Concern for new onset atrial fibrillation  Diabetes mellitus type 2  Chronic obstructive pulmonary disease  Prior tobacco dependence  Hypertension  Obesity    Extensive discussion between patient's son Terra Eden) and palliative care. Son has decided for comfort care/hospice measures. Plans to discharge to hospice at this time. Discharge Exam:   BP (!) 187/94   Pulse 121   Temp 97 °F (36.1 °C) (Temporal)   Resp 12   Ht 5' 5\" (1.651 m)   Wt 222 lb 4.8 oz (100.8 kg)   SpO2 96%   BMI 36.99 kg/m²     General: mild distress  HEENT: normocephalic, atraumatic  Neck: supple, symmetrical, trachea midline   Lungs: clear to auscultation bilaterally   Cardiovascular: s1 and s2 normal  Abdomen: soft, positive bowel sounds  Extremities: no edema or cyanosis   Neuro: mild improvement in encephalopathy    Recent Labs     21  0455 06/10/21  0420 21  0354   WBC 13.6* 13.4* 12.2*   HGB 10.5* 11.2* 10.7*    237 232     Recent Labs     21  0455 06/10/21  0420 21  0354    140 142   K 3.2* 3.4* 3.0*    105 107   CO2 24 23 22   BUN 14 11 10   CREATININE 0.8 0.8 0.8   GLUCOSE 108 94 95     Recent Labs     06/10/21  0420 21  0354   AST 15 14   ALT 6 6   BILITOT 0.5 0.5   ALKPHOS 120* 110*     Troponin T: No results for input(s): TROPONINI in the last 72 hours. Pro-BNP: No results for input(s): BNP in the last 72 hours.   INR:   Recent Labs     21  1043   INR 1.14     UA:No results for input(s): NITRITE, COLORU, PHUR, LABCAST, Bonifacio Jie, MUCUS, TRICHOMONAS, YEAST, BACTERIA, CLARITYU, SPECGRAV, LEUKOCYTESUR, UROBILINOGEN, BILIRUBINUR, BLOODU, GLUCOSEU, AMORPHOUS in the last 72 hours. A1C: No results for input(s): LABA1C in the last 72 hours. ABG:No results for input(s): PHART, AXC6IPN, PO2ART, PCE6HCD, BEART, HGBAE, F3VAOSEO, CARBOXHGBART in the last 72 hours. XR CHEST PORTABLE    Result Date: 6/11/2021  1. No acute findings. 2.  Feeding tube tip is in the proximal stomach. Signed by Dr Hair Farias    Result Date: 6/10/2021  Distal end of the Dobbhoff tube in the proximal stomach. No active cardiopulmonary disease. Signed by Dr Rakesh Olivier    Result Date: 6/9/2021  A successful lumbar puncture and opening pressure measurement. Fluoroscopy time: 1 minute 16 seconds. Dose: 0.71 8.mGym2.  Signed by Dr Lambert Duty on 6/9/2021 4:21 PM     Discharge Disposition: [facility]    Time Spent on Discharge: 40 minutes     Lorraine Polanco MD  6/11/2021 2:37 PM

## 2021-06-11 NOTE — PROGRESS NOTES
[facility] Neurology  [address] [place] [address] Drive, [address]  [place], [place] [address]  Phone [phone]     Neurology Progress Note  2021 12:18 PM  Information:   Patient Name: Lesa Banda  :   1955  Age:   63 y.o. MRN:   [id number]  Account #:  [de-identified]  Admit Date:   2021  Today:  21     ADMIT DX:   Right hemisphere, cerebral infarction [facility])    Subjective:     Josselyn Edouard a 63y.o. year old woman with a history of hypertension, hyperlipidemia, diabetes, cardiovascular disease, and previous strokes who was transferred from [facility] ER  after having yet another stroke with left sided weakness.  She recovered fairly well and had a PFO closure on .  That evening she became minimally responsive.     Interval History:   Less moaning. She opens her eyes and makes eye contact. She was able to follow a couple simple commands for nurse today. She is moving her extremities but minimally right arm movement. Objective:     Past Medical History:  Past Medical History:   Diagnosis Date    [facility]bral [facility] occlusion [facility]     COPD (chronic obstructive pulmonary disease) (HCC)     Diabetes mellitus ([facility]     Hypertension     Neuromuscular disorder ([facility]     Palliative care patient 2021       Past Surgical History:   Procedure Laterality Date    ABDOMEN SURGERY      gastric resection    APPENDECTOMY      CARDIAC SURGERY       SECTION      x2    CHOLECYSTECTOMY      CORONARY ANGIOPLASTY      SPLENECTOMY         No family history on file. Social History     Socioeconomic History    Marital status:       Spouse name: Not on file    Number of children: Not on file    Years of education: Not on file    Highest education level: Not on file   Occupational History    Not on file   Tobacco Use    Smoking status: Former Smoker     Packs/day: 1.00     Years: 28.00     Pack years: 28.00     Types: Cigarettes     Quit date: 2019     Years since quitting: 2. 4    Smokeless tobacco: Never Used   Vaping Use    Vaping Use: Never used   Substance and Sexual Activity    Alcohol use: Not Currently    Drug use: Never    Sexual activity: Not Currently   Other Topics Concern    Not on file   Social History Narrative    Not on file     Social Determinants of Health     Financial Resource Strain:     Difficulty of Paying Living Expenses:    Food Insecurity:     Worried About Running Out of Food in the Last Year:     [address] in the Last Year:    Transportation Needs:     Lack of Transportation (Medical):      Lack of Transportation (Non-Medical):    Physical Activity:     Days of Exercise per Week:     Minutes of Exercise per Session:    Stress:     Feeling of Stress :    Social Connections:     Frequency of Communication with Friends and Family:     Frequency of Social Gatherings with Friends and Family:     Attends Denominational Services:     Active Member of Clubs or Organizations:     Attends Club or Organization Meetings:     Marital Status:    Intimate Partner Violence:     Fear of Current or Ex-Partner:     Emotionally Abused:     Physically Abused:     Sexually Abused:        Medications:   sodium chloride      dextrose        potassium chloride  10 mEq Intravenous Q1H    lacosamide (VIMPAT) IVPB  200 mg Intravenous BID    fosphenytoin  100 mg PE Intravenous Q8H    valproate sodium (DEPACON) IVPB  1,000 mg Intravenous Q12H    [Held by provider] ticagrelor  90 mg Oral BID    metoprolol  5 mg Intravenous Q6H    sodium chloride flush  5-40 mL Intravenous 2 times per day    bisacodyl  10 mg Rectal Daily    magnesium hydroxide  30 mL Oral Daily    aspirin  81 mg Oral Daily    insulin lispro  0-6 Units Subcutaneous TID WC    insulin lispro  0-3 Units Subcutaneous Nightly       Diagnostic Studies:  Reviewed all labs/diagnositcs since last 24hrs:  Recent Results (from the past 24 hour(s))   POCT Glucose    Collection Time: 06/10/21  4:28 PM   Result Value Ref Range    POC Glucose 153 (H) 70 - 99 mg/dl    Performed on AccuChek    POCT Glucose    Collection Time: 06/10/21  8:29 PM   Result Value Ref Range    POC Glucose 141 (H) 70 - 99 mg/dl    Performed on AccuChek    Basic Metabolic Panel w/ Reflex to MG    Collection Time: 06/11/21  3:54 AM   Result Value Ref Range    Sodium 142 136 - 145 mmol/L    Potassium reflex Magnesium 3.0 (L) 3.5 - 5.0 mmol/L    Chloride 107 98 - 111 mmol/L    CO2 22 22 - 29 mmol/L    Anion Gap 13 7 - 19 mmol/L    Glucose 95 74 - 109 mg/dL    BUN 10 8 - 23 mg/dL    CREATININE 0.8 0.5 - 0.9 mg/dL    GFR Non-African American >60 >60    GFR African American >59 >59    Calcium 8.8 8.8 - 10.2 mg/dL   PHENYTOIN LEVEL, TOTAL    Collection Time: 06/11/21  3:54 AM   Result Value Ref Range    Phenytoin Lvl 11.0 10.0 - 20.0 ug/mL   VALPROIC ACID LEVEL, TOTAL    Collection Time: 06/11/21  3:54 AM   Result Value Ref Range    Valproic Acid Lvl 82.4 50.0 - 100.0 ug/mL   CBC Auto Differential    Collection Time: 06/11/21  3:54 AM   Result Value Ref Range    WBC 12.2 (H) 4.8 - 10.8 K/uL    RBC 3.39 (L) 4.20 - 5.40 M/uL    Hemoglobin 10.7 (L) 12.0 - 16.0 g/dL    Hematocrit 32.0 (L) 37.0 - 47.0 %    MCV 94.4 81.0 - 99.0 fL    MCH 31.6 (H) 27.0 - 31.0 pg    MCHC 33.4 33.0 - 37.0 g/dL    RDW 14.1 11.5 - 14.5 %    Platelets 761 160 - 244 K/uL    MPV 11.7 9.4 - 12.3 fL    Neutrophils % 61.4 50.0 - 65.0 %    Lymphocytes % 22.4 20.0 - 40.0 %    Monocytes % 12.8 (H) 0.0 - 10.0 %    Eosinophils % 1.8 0.0 - 5.0 %    Basophils % 0.9 0.0 - 1.0 %    Neutrophils Absolute 7.5 1.5 - 7.5 K/uL    Immature Granulocytes # 0.1 K/uL    Lymphocytes Absolute 2.7 1.1 - 4.5 K/uL    Monocytes Absolute 1.60 (H) 0.00 - 0.90 K/uL    Eosinophils Absolute 0.20 0.00 - 0.60 K/uL    Basophils Absolute 0.10 0.00 - 0.20 K/uL   Magnesium    Collection Time: 06/11/21  3:54 AM   Result Value Ref Range    Magnesium 1.6 1.6 - 2.4 mg/dL   Comprehensive Metabolic Panel    Collection 24-Jan-2019 Time: 06/11/21  3:54 AM   Result Value Ref Range    Total Protein 6.3 (L) 6.6 - 8.7 g/dL    Albumin 2.9 (L) 3.5 - 5.2 g/dL    Total Bilirubin 0.5 0.2 - 1.2 mg/dL    Alkaline Phosphatase 110 (H) 35 - 104 U/L    ALT 6 5 - 33 U/L    AST 14 5 - 32 U/L   POCT Glucose    Collection Time: 06/11/21  7:07 AM   Result Value Ref Range    POC Glucose 101 (H) 70 - 99 mg/dl    Performed on AccuChek    POCT Glucose    Collection Time: 06/11/21 11:21 AM   Result Value Ref Range    POC Glucose 93 70 - 99 mg/dl    Performed on AccuChek        Diet:  Diet NPO Exceptions are: Sips with Meds    Examination:  Vitals: BP (!) 161/82   Pulse 77   Temp 98.2 °F (36.8 °C) (Temporal)   Resp 12   Ht 5' 5\" (1.651 m)   Wt 222 lb 4.8 oz (100.8 kg)   SpO2 96%   BMI 36.99 kg/m²      Intake/Output Summary (Last 24 hours) at 6/11/2021 1218  Last data filed at 6/11/2021 1143  Gross per 24 hour   Intake 1630 ml   Output 1100 ml   Net 530 ml     General appearance: She is an obese woman who is drowsy. HEENT: Exam; heent: Head: Normal, normocephalic, atraumatic. Lungs: clear to auscultation bilaterally  Heart: regular rate and rhythm, S1, S2 normal, no murmur, click, rub or gallop  Abdomen: soft, non-tender; bowel sounds normal; no masses,  no organomegaly  Extremities: extremities normal, atraumatic, no cyanosis or edema  Neurologic:  She wakens briefly and will open her eyes and make eye contact. She does not talk. She has followed a few simple commands - lifted left arm and moved bilateral toes. PERRL. She is generally weak. She moves her limbs purposefully but has little right arm movement    Assessment:   1.         Encephalopathy.  CTs, MRI head showed the recent small right hemisphere infarct. CSF was normal.  EEGs showed some vaguely paroxysmal 1 Hertz generalized waves without sharp component. This alternated with 6 Hertz generalized activity and did not correlate with mental status.   Clinically and electrophysiologically unusual for nonconvulsive seizures. Has not responded to lorazepam or 3 anticonvulsants although the paroxysmal activity had improved. 2.         Recent right hemisphere strokes with history of left and right hemisphere strokes convincing for cardioembolism.  The P2Y12 plt inh test indicates she is a Plavix nonresponder   3.         PFO with septal aneurysm s/p closure device placement  4.         Hypertension  5.         Diabetes  6.         Myelodysplasia with chronic leukocytosis    Plan:   1. Continue Depacon 750 mg q 12 hours, Increase fosphenytoin to 200 mg q 12 hours, continue Vimpat 200 mg q 12 hours  2. Start TF per DHT per dietician recommendations  3. Consider PEG if not Hospice       Discharge planning: To be determined    Reviewed treatment plans with the patient and/or family. 35 minutes spent in face to face interaction and coordination of care.      Electronically signed by Mark Graham MD on 6/11/2021 at 12:18 PM 26-Jan-2019

## 2021-06-11 NOTE — PROGRESS NOTES
Family meeting. Pt's son Natalio Izaguirre is here. He is pt's only relative. He is supported by his mothers friend Radha who is also present. Extensive discussion about pt's current condition and potential outcome. Pt's nurse reviewed clinical picture with son and then we discussed options of care. In light of them opting for comfort care and not wishing to pursue a Gtube, they would like hospice care and for pt's symptoms to be managed. He understand this is end of life care. Natalio Izaguirre is very emotional, states, Tom Cortez is my best friend and all I have. I don't know what I will do without her. \"  Emotional support provided. Hospice consult entered and will send information for certification. Hospice Chaplain will f/u for admission signatures and we will work towards transfer to [facility]     Electronically signed by Yue Raymond RN on 6/11/2021 at 2:38 PM

## 2021-06-11 NOTE — SIGNIFICANT EVENT
Extensive discussion between patient's son Calleen May) and palliative care. Son has decided for comfort care/hospice measures. Plans to discharge to hospice at this time.     Sade Duran MD  6/11/2021

## 2021-06-11 NOTE — PROGRESS NOTES
Physical Therapy  Jina Asencio  [id number]     06/11/21 0813   Subjective   Subjective Patient moaning, non verbal and does not open eyes during treatment. Patient not resistant to ROM. Exercises   Straight Leg Raise 20   Heelslides 20   Hip Flexion 20   Hip Abduction 20   Knee Short Arc Quad 20   Knee Passive Range of Motion yes   Ankle Pumps 20   Upper Extremity 20   Comments PROM B LE and B UE in supine   Other Activities   Comment Patient positioned for comfort in bed with PCA assist. All needs in reach. Short term goals   Time Frame for Short term goals 2 wks   Short term goal 1 supine to sit indep   Short term goal 2 sit to stand indep   Short term goal 3 amb. 25' with RW SBA   Activity Tolerance   Activity Tolerance Treatment limited secondary to medical complications (free text)   Safety Devices   Type of devices Bed alarm in place;Call light within reach; Left in bed   Electronically signed by Selena Downs PTA on 6/11/2021 at 8:17 AM

## 2021-06-11 NOTE — PROGRESS NOTES
Hospitalist Progress Note  [facility]     Patient: Con Patel  : 1955  MRN: [id number]  Code Status: [facility]    Hospital Day: 13   Date of Service: 2021    Subjective:   Patient seen and examined. No acute distress. Past Medical History:   Diagnosis Date    Cerebral artery occlusion with [facility] infarction ([facility]     COPD (chronic obstructive pulmonary disease) (HCC)     Diabetes mellitus ([facility]     Hypertension     Neuromuscular disorder ([facility]     Palliative care patient 2021       Past Surgical History:   Procedure Laterality Date    ABDOMEN SURGERY      gastric resection    APPENDECTOMY      CARDIAC SURGERY       SECTION      x2    CHOLECYSTECTOMY      CORONARY ANGIOPLASTY      SPLENECTOMY         No family history on file. Social History     Socioeconomic History    Marital status:      Spouse name: Not on file    Number of children: Not on file    Years of education: Not on file    Highest education level: Not on file   Occupational History    Not on file   Tobacco Use    Smoking status: Former Smoker     Packs/day: 1.00     Years: 28.00     Pack years: 28.00     Types: Cigarettes     Quit date: 2019     Years since quittin.4    Smokeless tobacco: Never Used   Vaping Use    Vaping Use: Never used   Substance and Sexual Activity    Alcohol use: Not Currently    Drug use: Never    Sexual activity: Not Currently   Other Topics Concern    Not on file   Social History Narrative    Not on file     Social Determinants of Health     Financial Resource Strain:     Difficulty of Paying Living Expenses:    Food Insecurity:     Worried About [address] in the Last Year:     [address] in the Last Year:    Transportation Needs:     Lack of Transportation (Medical):      Lack of Transportation (Non-Medical):    Physical Activity:     Days of Exercise per Week:     Minutes of Exercise per Session:    Stress:     Feeling of Stress :    Social Connections:     Frequency of Communication with Friends and Family:     Frequency of Social Gatherings with Friends and Family:     Attends Spiritism Services:     Active Member of Clubs or Organizations:     Attends Club or Organization Meetings:     Marital Status:    Intimate Partner Violence:     Fear of Current or Ex-Partner:     Emotionally Abused:     Physically Abused:     Sexually Abused:        Current Facility-Administered Medications   Medication Dose Route Frequency Provider Last Rate Last Admin    lacosamide (VIMPAT) 200 mg in dextrose 5 % 70 mL IVPB  200 mg Intravenous BID Kamron Santos MD   Stopped at 06/11/21 0059    fosphenytoin (CEREBYX) injection 100 mg PE  100 mg PE Intravenous Q8H Kamron Santos MD   100 mg PE at 06/10/21 2157    valproate (DEPACON) 1,000 mg in dextrose 5 % 100 mL IVPB  1,000 mg Intravenous Q12H Kamron Santos  mL/hr at 06/11/21 0505 1,000 mg at 06/11/21 0505    [Held by provider] ticagrelor (BRILINTA) tablet 90 mg  90 mg Oral BID Ravindra Mujica MD   90 mg at 06/04/21 1144    LORazepam (ATIVAN) injection 0.5 mg  0.5 mg Intravenous Q4H PRN Day Lozada MD   0.5 mg at 06/06/21 0852    metoprolol (LOPRESSOR) injection 5 mg  5 mg Intravenous Q6H Tawnya Mcmanus DO   5 mg at 06/10/21 2157    hydrALAZINE (APRESOLINE) injection 5 mg  5 mg Intravenous Q4H PRN Bella Pickard MD   5 mg at 06/10/21 1515    perflutren lipid microspheres (DEFINITY) injection 1.65 mg  1.5 mL Intravenous ONCE PRN Yeniaretha Fret, APRN - CNP        sodium chloride flush 0.9 % injection 5-40 mL  5-40 mL Intravenous 2 times per day Yeniaretha Fret, APRN - CNP   10 mL at 06/09/21 2044    sodium chloride flush 0.9 % injection 5-40 mL  5-40 mL Intravenous PRN Vandanaa Fret, APRN - CNP   10 mL at 06/08/21 0341    0.9 % sodium chloride infusion  25 mL Intravenous PRN Yeniaretha Fret, APRN - CNP        acetaminophen (TYLENOL) tablet 650 mg  650 mg Oral Q4H PRN ALEXYS Evans - CNP        bisacodyl (DULCOLAX) suppository 10 mg  10 mg Rectal Daily Omid Ennis MD   10 mg at 06/10/21 1208    magnesium hydroxide (MILK OF MAGNESIA) 400 MG/5ML suspension 30 mL  30 mL Oral Daily Omid Ennis MD   30 mL at 06/04/21 1144    polyethylene glycol (GLYCOLAX) packet 17 g  17 g Oral Daily PRN Sylvia Reyna MD        acetaminophen (TYLENOL) suppository 650 mg  650 mg Rectal Q6H PRN Sylvia Reyna MD        potassium chloride (KLOR-CON M) extended release tablet 40 mEq  40 mEq Oral PRN Sylvia Reyna MD        Or    potassium bicarb-citric acid (EFFER-K) effervescent tablet 40 mEq  40 mEq Oral PRN Sylvia Reyna MD        Or    potassium chloride 10 mEq/100 mL IVPB (Peripheral Line)  10 mEq Intravenous PRN Sylvia Reyna MD        potassium chloride 10 mEq/100 mL IVPB (Peripheral Line)  10 mEq Intravenous PRN Sylvia Reyna  mL/hr at 06/10/21 0627 10 mEq at 06/10/21 0627    ondansetron (ZOFRAN) injection 4 mg  4 mg Intravenous Q6H PRN Sylvia Reyna MD        aspirin chewable tablet 81 mg  81 mg Oral Daily Sylvia Reyna MD   81 mg at 06/04/21 1144    insulin lispro (HUMALOG) injection vial 0-6 Units  0-6 Units Subcutaneous TID WC Sylvia Reyna MD   1 Units at 06/04/21 1706    insulin lispro (HUMALOG) injection vial 0-3 Units  0-3 Units Subcutaneous Nightly Sylvia Reyna MD   1 Units at 06/05/21 1684    glucose (GLUTOSE) 40 % oral gel 15 g  15 g Oral PRN Sylvia Reyna MD        dextrose 50 % IV solution  12.5 g Intravenous PRN Sylvia Reyna MD        glucagon (rDNA) injection 1 mg  1 mg Intramuscular PRN Sylvia Reyna MD        dextrose 5 % solution  100 mL/hr Intravenous PRN Sylvia Reyna MD             sodium chloride      dextrose Objective:   BP (!) 175/84   Pulse 80   Temp 98 °F (36.7 °C) (Temporal)   Resp 16   Ht 5' 5\" (1.651 m)   Wt 222 lb 4.8 oz (100.8 kg)   SpO2 95%   BMI 36.99 kg/m²     General: no acute distress  HEENT: normocephalic, atraumatic  Neck: supple, symmetrical, trachea midline   Lungs: clear to auscultation bilaterally   Cardiovascular: s1 and s2 normal  Abdomen: soft, positive bowel sounds  Extremities: no edema or cyanosis   Neuro: mild improvement in encephalopathy    Recent Labs     06/09/21 0455 06/10/21  0420 06/11/21  0354   WBC 13.6* 13.4* 12.2*   RBC 3.26* 3.61* 3.39*   HGB 10.5* 11.2* 10.7*   HCT 31.4* 34.3* 32.0*   MCV 96.3 95.0 94.4   MCH 32.2* 31.0 31.6*   MCHC 33.4 32.7* 33.4    237 232     Recent Labs     06/09/21  0455 06/10/21  0420 06/11/21  0354    140 142   K 3.2* 3.4* 3.0*   ANIONGAP 10 12 13    105 107   CO2 24 23 22   BUN 14 11 10   CREATININE 0.8 0.8 0.8   GLUCOSE 108 94 95   CALCIUM 8.5* 8.7* 8.8     Recent Labs     06/09/21  0455 06/10/21  0420 06/11/21  0354   MG 1.4* 1.8 1.6     Recent Labs     06/10/21  0420 06/11/21  0354   AST 15 14   ALT 6 6   BILITOT 0.5 0.5   ALKPHOS 120* 110*     No results for input(s): PH, PO2, PCO2, HCO3, BE, O2SAT in the last 72 hours. No results for input(s): TROPONINI in the last 72 hours. Recent Labs     06/09/21  1043   INR 1.14     No results for input(s): LACTA in the last 72 hours. Intake/Output Summary (Last 24 hours) at 6/11/2021 1042  Last data filed at 6/11/2021 1031  Gross per 24 hour   Intake 1630 ml   Output --   Net 1630 ml       XR CHEST PORTABLE    Result Date: 6/10/2021  Examination. XR CHEST PORTABLE 6/10/2021 2:02 PM History: Dobbhoff placement. A single frontal portable upright view of the chest is compared with the previous study dated 6/4/2021. The lungs are moderately expanded with chronic appearing interstitial changes. There is no pleural effusion, pulmonary congestion or pneumothorax.  Heart size in the normal range. There is no acute bony abnormality. A Dobbhoff tube is seen with distal end in the proximal stomach. No acute bony abnormality. Distal end of the Dobbhoff tube in the proximal stomach. No active cardiopulmonary disease. Signed by Dr Robert Arias    Result Date: 6/9/2021  Examination. FL LUMBAR PUNCTURE DIAG 6/9/2021 1:21 PM History: Encephalopathy. The procedure have been explained to the patient's caretaker/guardian and informed consent had been obtained. The patient is placed on the fluoroscopy table in prone position and the lumbar spine is examined. A suitable spot opposite interspace L2-3 is selected for puncture. The spot was marked with an ink marker. After sterile preparation and application of local anesthesia a size 20-gauge spinal needle was introduced into the thecal sac. A FreeFlow of clear colorless thin fluid was established. The patient was placed in decubitus position and the outer hub of the spinal needle was attached to a pressure measuring device. The opening pressure was measured which is 12.5 cm water. Subsequently 13 mL of CSF was removed and dispensed in 2 separate test tubes which were sent to the laboratory for further evaluation as instructed by the attending physician. The needle was then withdrawn and the puncture site was covered with a sterile Band-Aid. The patient tolerated the procedure well. The post lumbar puncture care instructions were given to the nurse in charge of the floor. A successful lumbar puncture and opening pressure measurement. Fluoroscopy time: 1 minute 16 seconds. Dose: 0.71 8.mGym2.  Signed by Dr Ester Bajwa on 6/9/2021 4:21 PM       Assessment and Plan:   Persistent encephalopathy  MRI brain 5/29/2021: Right-sided small acute infarcts, multiple bilateral remote infarcts  Neurology managing  S/p PFO closure 6/2/2021 per Dr. Dahl Sole    Concern for new onset atrial fibrillation  EKG ordered  Serial troponin  TTE reviewed  Follow electrolytes  Cardiology/neurology input requested     DM2  Meds on board     DVT prophylaxis  SCDs    Sade Duran MD   6/11/2021 10:42 AM

## 2021-06-11 NOTE — CONSULTS
Comprehensive Nutrition Assessment    Type and Reason for Visit:  Reassess, Consult    Nutrition Recommendations/Plan: Initiate EN: Glucerna 1.5 @ 50 mL/hr. Flush tube with 40 mL/H2O every hr. TF and flushes will provide 1800 kcals, 99 g/PRO, 160 g/CHO, and 911 mL/fluid daily. Nutrition Assessment:  Pt is currently NPO. Consult received for TF ordering and management. Will initiate EN and continue to monitor clinical course. Malnutrition Assessment:  Malnutrition Status:   At risk for malnutrition (Comment)    Context:  Acute Illness     Findings of the 6 clinical characteristics of malnutrition:  Energy Intake:  7 - 50% or less of estimated energy requirements for 5 or more days  Weight Loss:  No significant weight loss     Body Fat Loss:  No significant body fat loss     Muscle Mass Loss:  No significant muscle mass loss    Fluid Accumulation:  No significant fluid accumulation     Strength:  Not Performed    Estimated Daily Nutrient Needs:  Energy (kcal):  2902-8552 kcals/day; Weight Used for Energy Requirements:   (15-18)     Protein (g):   g/PRO/day; Weight Used for Protein Requirements:  Ideal (1.2-2.0)        Fluid (ml/day):  3298-7644 mL/day; Method Used for Fluid Requirements:  1 ml/kcal         Current Nutrition Therapies:    Diet NPO Exceptions are: Sips with Meds    Anthropometric Measures:  · Height: 5' 5\" (165.1 cm)  · Current Body Weight: 222 lb (100.7 kg)    · Ideal Body Weight: 125 lbs  · BMI: 36.9   · BMI Categories: Obese Class 2 (BMI 35.0 -39.9)       Nutrition Diagnosis:   · Inadequate oral intake related to acute injury/trauma as evidenced by NPO or clear liquid status due to medical condition    Nutrition Interventions:   Food and/or Nutrient Delivery:  Continue NPO, Start Tube Feeding  Coordination of Nutrition Care:  Continue to monitor while inpatient    Goals:  Pt will progress to an oral diet to meet nutritional needs or needs will be met with JOSÉ       Nutrition Monitoring and Evaluation:   Food/Nutrient Intake Outcomes:  Enteral Nutrition Intake/Tolerance  Physical Signs/Symptoms Outcomes:  Biochemical Data, Nutrition Focused Physical Findings, Weight     Electronically signed by Nikky Msoley, MS, RD, LD on 6/11/21 at 1:13 PM CDT    Contact: [phone]

## 2021-06-11 NOTE — PROGRESS NOTES
The pts son signed the adm forms admitting the pt to the [facility].. It is ok to dc the pt, call [phone] to give report then transfer the pt to the [facility].. Emotional and sc support provided.

## 2021-06-14 PROBLEM — R56.9 SEIZURES (HCC): Status: ACTIVE | Noted: 2021-01-01

## 2021-06-14 PROBLEM — D46.9 MYELODYSPLASIA (MYELODYSPLASTIC SYNDROME) (HCC): Chronic | Status: ACTIVE | Noted: 2021-01-01

## 2021-06-14 PROBLEM — J96.01 ACUTE RESPIRATORY FAILURE WITH HYPOXIA (HCC): Status: ACTIVE | Noted: 2021-01-01

## 2023-05-24 NOTE — CARE COORDINATION
Date / Time of Evaluation: 6/1/2021 3:44 PM    Stroke Transition Readiness Assessment Completed by: Genevieve Interiano RN    Current PCP:  Kimberley Cadena MD  PCP verified? yes    Initial Assessment Completed at bedside with:  patient    Emergency Contacts:  Extended Emergency Contact Information  Primary Emergency Contact: Jewel Clifford  Mobile Phone: [phone]  Relation: Child    Advance Directives: Code Status:  Full Code    Financial:  Payor: [employer] / Plan: [employer]  / Product Type: *No Product type* /       Ask the following 3 questions of the patient and/or primary caregiver: On a scale of 1 to 10, with 10 indicating completely educated, what is your overall rating on how well you were educated about your stroke diagnosis? (answer will be a number)   8    If answer is less than 7, detail the learning deficits here: na          Document plan to address those deficits: na        Can you tell me what your stroke risk factors are? Yes, verbalized back to me      Can you tell me the signs and symptoms of a stroke? Yes, verbalized back to me      Transition Plan:   Will need to have PFO closure done as this is what they think is the cause of strokes      Barriers to Stroke Risk Management:  none      Additional CM/SW Notes: scheduled for PFO closure on 6/2 per patient            Genevieve Interiano RN  [address] Management Department  Ph:  [phone]   Fax: [phone]